# Patient Record
Sex: MALE | Race: WHITE | Employment: OTHER | URBAN - NONMETROPOLITAN AREA
[De-identification: names, ages, dates, MRNs, and addresses within clinical notes are randomized per-mention and may not be internally consistent; named-entity substitution may affect disease eponyms.]

---

## 2017-05-10 ENCOUNTER — OFFICE VISIT (OUTPATIENT)
Dept: PRIMARY CARE CLINIC | Age: 72
End: 2017-05-10
Payer: MEDICARE

## 2017-05-10 VITALS
HEIGHT: 74 IN | WEIGHT: 203.44 LBS | DIASTOLIC BLOOD PRESSURE: 84 MMHG | HEART RATE: 71 BPM | TEMPERATURE: 98.6 F | OXYGEN SATURATION: 97 % | SYSTOLIC BLOOD PRESSURE: 136 MMHG | BODY MASS INDEX: 26.11 KG/M2

## 2017-05-10 DIAGNOSIS — G60.9 IDIOPATHIC PERIPHERAL NEUROPATHY: Primary | ICD-10-CM

## 2017-05-10 PROCEDURE — G8427 DOCREV CUR MEDS BY ELIG CLIN: HCPCS | Performed by: NURSE PRACTITIONER

## 2017-05-10 PROCEDURE — 4040F PNEUMOC VAC/ADMIN/RCVD: CPT | Performed by: NURSE PRACTITIONER

## 2017-05-10 PROCEDURE — G8420 CALC BMI NORM PARAMETERS: HCPCS | Performed by: NURSE PRACTITIONER

## 2017-05-10 PROCEDURE — 1123F ACP DISCUSS/DSCN MKR DOCD: CPT | Performed by: NURSE PRACTITIONER

## 2017-05-10 PROCEDURE — 3017F COLORECTAL CA SCREEN DOC REV: CPT | Performed by: NURSE PRACTITIONER

## 2017-05-10 PROCEDURE — 1036F TOBACCO NON-USER: CPT | Performed by: NURSE PRACTITIONER

## 2017-05-10 PROCEDURE — 99213 OFFICE O/P EST LOW 20 MIN: CPT | Performed by: NURSE PRACTITIONER

## 2017-06-20 ENCOUNTER — OUTSIDE FACILITY SERVICE (OUTPATIENT)
Dept: CARDIOLOGY | Facility: CLINIC | Age: 72
End: 2017-06-20

## 2017-06-20 PROCEDURE — 93306 TTE W/DOPPLER COMPLETE: CPT | Performed by: INTERNAL MEDICINE

## 2017-06-22 ENCOUNTER — TELEPHONE (OUTPATIENT)
Dept: PRIMARY CARE CLINIC | Age: 72
End: 2017-06-22

## 2017-06-24 ENCOUNTER — HOSPITAL ENCOUNTER (INPATIENT)
Facility: HOSPITAL | Age: 72
LOS: 6 days | Discharge: NURSING, REHAB OR LONG-TERM CARE | End: 2017-06-30
Attending: INTERNAL MEDICINE | Admitting: FAMILY MEDICINE

## 2017-06-24 ENCOUNTER — APPOINTMENT (OUTPATIENT)
Dept: GENERAL RADIOLOGY | Facility: HOSPITAL | Age: 72
End: 2017-06-24

## 2017-06-24 ENCOUNTER — APPOINTMENT (OUTPATIENT)
Dept: MRI IMAGING | Facility: HOSPITAL | Age: 72
End: 2017-06-24

## 2017-06-24 DIAGNOSIS — Z78.9 IMPAIRED MOBILITY AND ADLS: ICD-10-CM

## 2017-06-24 DIAGNOSIS — Z74.09 IMPAIRED MOBILITY AND ADLS: ICD-10-CM

## 2017-06-24 DIAGNOSIS — R13.12 OROPHARYNGEAL DYSPHAGIA: Primary | ICD-10-CM

## 2017-06-24 DIAGNOSIS — R41.89 IMPAIRED COGNITION: ICD-10-CM

## 2017-06-24 DIAGNOSIS — Z74.09 IMPAIRED MOBILITY: ICD-10-CM

## 2017-06-24 PROBLEM — G04.90 ENCEPHALITIS: Status: ACTIVE | Noted: 2017-06-24

## 2017-06-24 LAB
ALBUMIN SERPL-MCNC: 3.9 G/DL (ref 3.5–5)
ALBUMIN/GLOB SERPL: 1.2 G/DL (ref 1.1–2.5)
ALP SERPL-CCNC: 80 U/L (ref 24–120)
ALT SERPL W P-5'-P-CCNC: 37 U/L (ref 0–54)
AMMONIA BLD-SCNC: <9 UMOL/L (ref 9–33)
AMYLASE SERPL-CCNC: 157 U/L (ref 30–110)
ANION GAP SERPL CALCULATED.3IONS-SCNC: 11 MMOL/L (ref 4–13)
APTT PPP: 30.5 SECONDS (ref 24.1–34.8)
ARTERIAL PATENCY WRIST A: ABNORMAL
AST SERPL-CCNC: 24 U/L (ref 7–45)
ATMOSPHERIC PRESS: ABNORMAL MMHG
BACTERIA UR QL AUTO: ABNORMAL /HPF
BASE EXCESS BLDA CALC-SCNC: -2 MMOL/L (ref -2–2)
BDY SITE: ABNORMAL
BILIRUB SERPL-MCNC: 1.5 MG/DL (ref 0.1–1)
BILIRUB UR QL STRIP: NEGATIVE
BILIRUB UR QL STRIP: NEGATIVE
BUN BLD-MCNC: 19 MG/DL (ref 5–21)
BUN/CREAT SERPL: 18.4 (ref 7–25)
CALCIUM SPEC-SCNC: 9.4 MG/DL (ref 8.4–10.4)
CHLORIDE SERPL-SCNC: 98 MMOL/L (ref 98–110)
CLARITY UR: CLEAR
CLARITY UR: CLEAR
CO2 SERPL-SCNC: 23 MMOL/L (ref 24–31)
COLOR UR: YELLOW
COLOR UR: YELLOW
CREAT BLD-MCNC: 1.03 MG/DL (ref 0.5–1.4)
CRP SERPL-MCNC: <0.5 MG/DL (ref 0–0.99)
D-LACTATE SERPL-SCNC: 0.9 MMOL/L (ref 0.5–2)
DEPRECATED RDW RBC AUTO: 41.2 FL (ref 40–54)
ERYTHROCYTE [DISTWIDTH] IN BLOOD BY AUTOMATED COUNT: 12.3 % (ref 12–15)
ERYTHROCYTE [SEDIMENTATION RATE] IN BLOOD: 8 MM/HR (ref 0–15)
FERRITIN SERPL-MCNC: 286 NG/ML (ref 17.9–464)
FOLATE SERPL-MCNC: >20 NG/ML
GFR SERPL CREATININE-BSD FRML MDRD: 72 ML/MIN/1.73
GLOBULIN UR ELPH-MCNC: 3.2 GM/DL
GLUCOSE BLD-MCNC: 105 MG/DL (ref 70–100)
GLUCOSE UR STRIP-MCNC: NEGATIVE MG/DL
GLUCOSE UR STRIP-MCNC: NEGATIVE MG/DL
HBA1C MFR BLD: 5.8 %
HCO3 BLDA-SCNC: 21.6 MMOL/L (ref 22–26)
HCT VFR BLD AUTO: 45.4 % (ref 40–52)
HGB BLD-MCNC: 16.4 G/DL (ref 14–18)
HGB UR QL STRIP.AUTO: NEGATIVE
HGB UR QL STRIP.AUTO: NEGATIVE
HYALINE CASTS UR QL AUTO: ABNORMAL /LPF
INR PPP: 1.05 (ref 0.91–1.09)
IRON 24H UR-MRATE: 58 MCG/DL (ref 42–180)
IRON SATN MFR SERPL: 24 % (ref 20–45)
KETONES UR QL STRIP: ABNORMAL
KETONES UR QL STRIP: ABNORMAL
LEUKOCYTE ESTERASE UR QL STRIP.AUTO: NEGATIVE
LEUKOCYTE ESTERASE UR QL STRIP.AUTO: NEGATIVE
LIPASE SERPL-CCNC: 97 U/L (ref 23–203)
LYMPHOCYTES # BLD MANUAL: 0.57 10*3/MM3 (ref 0.72–4.86)
LYMPHOCYTES NFR BLD MANUAL: 5 % (ref 4–12)
LYMPHOCYTES NFR BLD MANUAL: 6 % (ref 15–45)
MAGNESIUM SERPL-MCNC: 2.1 MG/DL (ref 1.4–2.2)
MCH RBC QN AUTO: 33.1 PG (ref 28–32)
MCHC RBC AUTO-ENTMCNC: 36.1 G/DL (ref 33–36)
MCV RBC AUTO: 91.5 FL (ref 82–95)
MODALITY: ABNORMAL
MONOCYTES # BLD AUTO: 0.47 10*3/MM3 (ref 0.19–1.3)
NEUTROPHILS # BLD AUTO: 8.33 10*3/MM3 (ref 1.87–8.4)
NEUTROPHILS NFR BLD MANUAL: 87 % (ref 39–78)
NEUTS BAND NFR BLD MANUAL: 1 % (ref 0–10)
NITRITE UR QL STRIP: NEGATIVE
NITRITE UR QL STRIP: NEGATIVE
PCO2 BLDA: 33.8 MM HG (ref 35–45)
PH BLDA: 7.42 PH UNITS (ref 7.35–7.45)
PH UR STRIP.AUTO: <=5 [PH] (ref 5–8)
PH UR STRIP.AUTO: <=5 [PH] (ref 5–8)
PHOSPHATE SERPL-MCNC: 3.5 MG/DL (ref 2.5–4.5)
PLAT MORPH BLD: NORMAL
PLATELET # BLD AUTO: 234 10*3/MM3 (ref 130–400)
PMV BLD AUTO: 10.7 FL (ref 6–12)
PO2 BLDA: 74.5 MM HG (ref 80–100)
POTASSIUM BLD-SCNC: 4.3 MMOL/L (ref 3.5–5.3)
PROT SERPL-MCNC: 7.1 G/DL (ref 6.3–8.7)
PROT UR QL STRIP: ABNORMAL
PROT UR QL STRIP: ABNORMAL
PROTHROMBIN TIME: 14 SECONDS (ref 11.9–14.6)
RBC # BLD AUTO: 4.96 10*6/MM3 (ref 4.8–5.9)
RBC # UR: ABNORMAL /HPF
RBC MORPH BLD: NORMAL
REF LAB TEST METHOD: ABNORMAL
SAO2 % BLDCOA: 95.4 % (ref 94–100)
SAO2 % BLDCOA: 95.4 % (ref 94–100)
SODIUM BLD-SCNC: 132 MMOL/L (ref 135–145)
SP GR UR STRIP: 1.02 (ref 1–1.03)
SP GR UR STRIP: 1.02 (ref 1–1.03)
SQUAMOUS #/AREA URNS HPF: ABNORMAL /HPF
T3FREE SERPL-MCNC: 2.25 PG/ML (ref 2.77–5.27)
T4 FREE SERPL-MCNC: 1.73 NG/DL (ref 0.78–2.19)
TIBC SERPL-MCNC: 239 MCG/DL (ref 225–420)
TSH SERPL DL<=0.05 MIU/L-ACNC: 0.71 MIU/ML (ref 0.47–4.68)
UROBILINOGEN UR QL STRIP: ABNORMAL
UROBILINOGEN UR QL STRIP: ABNORMAL
VARIANT LYMPHS NFR BLD MANUAL: 1 % (ref 0–5)
WBC MORPH BLD: NORMAL
WBC NRBC COR # BLD: 9.47 10*3/MM3 (ref 4.8–10.8)
WBC UR QL AUTO: ABNORMAL /HPF

## 2017-06-24 PROCEDURE — 71020 HC CHEST PA AND LATERAL: CPT

## 2017-06-24 PROCEDURE — 82728 ASSAY OF FERRITIN: CPT | Performed by: INTERNAL MEDICINE

## 2017-06-24 PROCEDURE — 85651 RBC SED RATE NONAUTOMATED: CPT | Performed by: INTERNAL MEDICINE

## 2017-06-24 PROCEDURE — 84425 ASSAY OF VITAMIN B-1: CPT | Performed by: PSYCHIATRY & NEUROLOGY

## 2017-06-24 PROCEDURE — 85730 THROMBOPLASTIN TIME PARTIAL: CPT | Performed by: INTERNAL MEDICINE

## 2017-06-24 PROCEDURE — 87040 BLOOD CULTURE FOR BACTERIA: CPT | Performed by: INTERNAL MEDICINE

## 2017-06-24 PROCEDURE — 25010000002 KETOROLAC TROMETHAMINE PER 15 MG: Performed by: PSYCHIATRY & NEUROLOGY

## 2017-06-24 PROCEDURE — A9577 INJ MULTIHANCE: HCPCS | Performed by: INTERNAL MEDICINE

## 2017-06-24 PROCEDURE — 84443 ASSAY THYROID STIM HORMONE: CPT | Performed by: INTERNAL MEDICINE

## 2017-06-24 PROCEDURE — 99223 1ST HOSP IP/OBS HIGH 75: CPT | Performed by: PSYCHIATRY & NEUROLOGY

## 2017-06-24 PROCEDURE — 25810000003 DEXTROSE-NACL PER 500 ML: Performed by: INTERNAL MEDICINE

## 2017-06-24 PROCEDURE — 0 GADOBENATE DIMEGLUMINE 529 MG/ML SOLUTION: Performed by: INTERNAL MEDICINE

## 2017-06-24 PROCEDURE — 83605 ASSAY OF LACTIC ACID: CPT | Performed by: INTERNAL MEDICINE

## 2017-06-24 PROCEDURE — 86038 ANTINUCLEAR ANTIBODIES: CPT | Performed by: INTERNAL MEDICINE

## 2017-06-24 PROCEDURE — 87106 FUNGI IDENTIFICATION YEAST: CPT | Performed by: INTERNAL MEDICINE

## 2017-06-24 PROCEDURE — 84481 FREE ASSAY (FT-3): CPT | Performed by: PSYCHIATRY & NEUROLOGY

## 2017-06-24 PROCEDURE — 82140 ASSAY OF AMMONIA: CPT | Performed by: PSYCHIATRY & NEUROLOGY

## 2017-06-24 PROCEDURE — 81001 URINALYSIS AUTO W/SCOPE: CPT | Performed by: INTERNAL MEDICINE

## 2017-06-24 PROCEDURE — 86140 C-REACTIVE PROTEIN: CPT | Performed by: INTERNAL MEDICINE

## 2017-06-24 PROCEDURE — 81003 URINALYSIS AUTO W/O SCOPE: CPT | Performed by: INTERNAL MEDICINE

## 2017-06-24 PROCEDURE — 85007 BL SMEAR W/DIFF WBC COUNT: CPT | Performed by: INTERNAL MEDICINE

## 2017-06-24 PROCEDURE — 80053 COMPREHEN METABOLIC PANEL: CPT | Performed by: INTERNAL MEDICINE

## 2017-06-24 PROCEDURE — 70553 MRI BRAIN STEM W/O & W/DYE: CPT

## 2017-06-24 PROCEDURE — 84100 ASSAY OF PHOSPHORUS: CPT | Performed by: INTERNAL MEDICINE

## 2017-06-24 PROCEDURE — 83735 ASSAY OF MAGNESIUM: CPT | Performed by: INTERNAL MEDICINE

## 2017-06-24 PROCEDURE — 82746 ASSAY OF FOLIC ACID SERUM: CPT | Performed by: PSYCHIATRY & NEUROLOGY

## 2017-06-24 PROCEDURE — 87205 SMEAR GRAM STAIN: CPT | Performed by: INTERNAL MEDICINE

## 2017-06-24 PROCEDURE — 85610 PROTHROMBIN TIME: CPT | Performed by: INTERNAL MEDICINE

## 2017-06-24 PROCEDURE — 82150 ASSAY OF AMYLASE: CPT | Performed by: INTERNAL MEDICINE

## 2017-06-24 PROCEDURE — 83550 IRON BINDING TEST: CPT | Performed by: INTERNAL MEDICINE

## 2017-06-24 PROCEDURE — 83036 HEMOGLOBIN GLYCOSYLATED A1C: CPT | Performed by: INTERNAL MEDICINE

## 2017-06-24 PROCEDURE — 83690 ASSAY OF LIPASE: CPT | Performed by: INTERNAL MEDICINE

## 2017-06-24 PROCEDURE — 36600 WITHDRAWAL OF ARTERIAL BLOOD: CPT

## 2017-06-24 PROCEDURE — 84439 ASSAY OF FREE THYROXINE: CPT | Performed by: INTERNAL MEDICINE

## 2017-06-24 PROCEDURE — 87086 URINE CULTURE/COLONY COUNT: CPT | Performed by: INTERNAL MEDICINE

## 2017-06-24 PROCEDURE — 82803 BLOOD GASES ANY COMBINATION: CPT

## 2017-06-24 PROCEDURE — 87150 DNA/RNA AMPLIFIED PROBE: CPT | Performed by: INTERNAL MEDICINE

## 2017-06-24 PROCEDURE — 83540 ASSAY OF IRON: CPT | Performed by: INTERNAL MEDICINE

## 2017-06-24 PROCEDURE — 86789 WEST NILE VIRUS ANTIBODY: CPT | Performed by: INTERNAL MEDICINE

## 2017-06-24 PROCEDURE — 86788 WEST NILE VIRUS AB IGM: CPT | Performed by: INTERNAL MEDICINE

## 2017-06-24 PROCEDURE — 85027 COMPLETE CBC AUTOMATED: CPT | Performed by: INTERNAL MEDICINE

## 2017-06-24 RX ORDER — BISACODYL 5 MG/1
5 TABLET, DELAYED RELEASE ORAL DAILY PRN
Status: DISCONTINUED | OUTPATIENT
Start: 2017-06-24 | End: 2017-06-30 | Stop reason: HOSPADM

## 2017-06-24 RX ORDER — ENALAPRILAT 2.5 MG/2ML
0.62 INJECTION INTRAVENOUS EVERY 6 HOURS PRN
Status: DISCONTINUED | OUTPATIENT
Start: 2017-06-24 | End: 2017-06-30 | Stop reason: HOSPADM

## 2017-06-24 RX ORDER — ACETAMINOPHEN 325 MG/1
650 TABLET ORAL EVERY 4 HOURS PRN
Status: DISCONTINUED | OUTPATIENT
Start: 2017-06-24 | End: 2017-06-26

## 2017-06-24 RX ORDER — KETOROLAC TROMETHAMINE 30 MG/ML
30 INJECTION, SOLUTION INTRAMUSCULAR; INTRAVENOUS EVERY 6 HOURS PRN
Status: DISPENSED | OUTPATIENT
Start: 2017-06-24 | End: 2017-06-29

## 2017-06-24 RX ORDER — ONDANSETRON 4 MG/1
4 TABLET, FILM COATED ORAL EVERY 6 HOURS PRN
Status: DISCONTINUED | OUTPATIENT
Start: 2017-06-24 | End: 2017-06-26

## 2017-06-24 RX ORDER — ACETAMINOPHEN 650 MG/1
650 SUPPOSITORY RECTAL EVERY 4 HOURS PRN
Status: DISCONTINUED | OUTPATIENT
Start: 2017-06-24 | End: 2017-06-30 | Stop reason: HOSPADM

## 2017-06-24 RX ORDER — LORAZEPAM 2 MG/ML
0.5 INJECTION INTRAMUSCULAR EVERY 6 HOURS PRN
Status: DISCONTINUED | OUTPATIENT
Start: 2017-06-24 | End: 2017-06-30 | Stop reason: HOSPADM

## 2017-06-24 RX ORDER — PANTOPRAZOLE SODIUM 40 MG/1
40 TABLET, DELAYED RELEASE ORAL
Status: DISCONTINUED | OUTPATIENT
Start: 2017-06-25 | End: 2017-06-30 | Stop reason: HOSPADM

## 2017-06-24 RX ORDER — DEXTROSE AND SODIUM CHLORIDE 5; .9 G/100ML; G/100ML
125 INJECTION, SOLUTION INTRAVENOUS CONTINUOUS
Status: DISCONTINUED | OUTPATIENT
Start: 2017-06-24 | End: 2017-06-26

## 2017-06-24 RX ORDER — ACETAMINOPHEN 325 MG/1
650 TABLET ORAL EVERY 4 HOURS PRN
Status: DISCONTINUED | OUTPATIENT
Start: 2017-06-24 | End: 2017-06-30 | Stop reason: HOSPADM

## 2017-06-24 RX ORDER — IBUPROFEN 400 MG/1
400 TABLET ORAL EVERY 4 HOURS PRN
Status: DISCONTINUED | OUTPATIENT
Start: 2017-06-24 | End: 2017-06-30 | Stop reason: HOSPADM

## 2017-06-24 RX ORDER — SODIUM CHLORIDE 0.9 % (FLUSH) 0.9 %
1-10 SYRINGE (ML) INJECTION AS NEEDED
Status: DISCONTINUED | OUTPATIENT
Start: 2017-06-24 | End: 2017-06-26

## 2017-06-24 RX ADMIN — DEXTROSE AND SODIUM CHLORIDE 125 ML/HR: 5; 900 INJECTION, SOLUTION INTRAVENOUS at 14:55

## 2017-06-24 RX ADMIN — KETOROLAC TROMETHAMINE 30 MG: 30 INJECTION, SOLUTION INTRAMUSCULAR at 16:19

## 2017-06-24 RX ADMIN — KETOROLAC TROMETHAMINE 30 MG: 30 INJECTION, SOLUTION INTRAMUSCULAR at 23:41

## 2017-06-24 RX ADMIN — DEXTROSE AND SODIUM CHLORIDE 125 ML/HR: 5; 900 INJECTION, SOLUTION INTRAVENOUS at 23:42

## 2017-06-24 RX ADMIN — GADOBENATE DIMEGLUMINE 10 ML: 529 INJECTION, SOLUTION INTRAVENOUS at 15:50

## 2017-06-25 ENCOUNTER — APPOINTMENT (OUTPATIENT)
Dept: ULTRASOUND IMAGING | Facility: HOSPITAL | Age: 72
End: 2017-06-25

## 2017-06-25 ENCOUNTER — APPOINTMENT (OUTPATIENT)
Dept: CT IMAGING | Facility: HOSPITAL | Age: 72
End: 2017-06-25

## 2017-06-25 ENCOUNTER — APPOINTMENT (OUTPATIENT)
Dept: CARDIOLOGY | Facility: HOSPITAL | Age: 72
End: 2017-06-25
Attending: INTERNAL MEDICINE

## 2017-06-25 LAB
ANION GAP SERPL CALCULATED.3IONS-SCNC: 9 MMOL/L (ref 4–13)
ARTICHOKE IGE QN: 60 MG/DL (ref 0–99)
BASOPHILS # BLD AUTO: 0.01 10*3/MM3 (ref 0–0.2)
BASOPHILS NFR BLD AUTO: 0.1 % (ref 0–2)
BH CV ECHO MEAS - AO MAX PG (FULL): 1.8 MMHG
BH CV ECHO MEAS - AO MAX PG: 8.1 MMHG
BH CV ECHO MEAS - AO MEAN PG (FULL): 2 MMHG
BH CV ECHO MEAS - AO MEAN PG: 4 MMHG
BH CV ECHO MEAS - AO ROOT AREA (BSA CORRECTED): 1.7
BH CV ECHO MEAS - AO ROOT AREA: 9.6 CM^2
BH CV ECHO MEAS - AO ROOT DIAM: 3.5 CM
BH CV ECHO MEAS - AO V2 MAX: 142 CM/SEC
BH CV ECHO MEAS - AO V2 MEAN: 86.8 CM/SEC
BH CV ECHO MEAS - AO V2 VTI: 22.3 CM
BH CV ECHO MEAS - AVA(I,A): 4 CM^2
BH CV ECHO MEAS - AVA(I,D): 4 CM^2
BH CV ECHO MEAS - AVA(V,A): 4 CM^2
BH CV ECHO MEAS - AVA(V,D): 4 CM^2
BH CV ECHO MEAS - BSA(HAYCOCK): 2 M^2
BH CV ECHO MEAS - BSA: 2 M^2
BH CV ECHO MEAS - BZI_BMI: 23.7 KILOGRAMS/M^2
BH CV ECHO MEAS - BZI_METRIC_HEIGHT: 182.9 CM
BH CV ECHO MEAS - BZI_METRIC_WEIGHT: 79.4 KG
BH CV ECHO MEAS - EDV(CUBED): 125 ML
BH CV ECHO MEAS - EDV(TEICH): 118.2 ML
BH CV ECHO MEAS - EF(CUBED): 87.5 %
BH CV ECHO MEAS - EF(TEICH): 81.1 %
BH CV ECHO MEAS - ESV(CUBED): 15.6 ML
BH CV ECHO MEAS - ESV(TEICH): 22.3 ML
BH CV ECHO MEAS - FS: 50 %
BH CV ECHO MEAS - IVS/LVPW: 1.1
BH CV ECHO MEAS - IVSD: 1.1 CM
BH CV ECHO MEAS - LA DIMENSION: 2.9 CM
BH CV ECHO MEAS - LA/AO: 0.83
BH CV ECHO MEAS - LAT PEAK E' VEL: 4.9 CM/SEC
BH CV ECHO MEAS - LV MASS(C)D: 194.4 GRAMS
BH CV ECHO MEAS - LV MASS(C)DI: 96.6 GRAMS/M^2
BH CV ECHO MEAS - LV MAX PG: 6.3 MMHG
BH CV ECHO MEAS - LV MEAN PG: 2 MMHG
BH CV ECHO MEAS - LV V1 MAX: 125 CM/SEC
BH CV ECHO MEAS - LV V1 MEAN: 66.1 CM/SEC
BH CV ECHO MEAS - LV V1 VTI: 19.8 CM
BH CV ECHO MEAS - LVIDD: 5 CM
BH CV ECHO MEAS - LVIDS: 2.5 CM
BH CV ECHO MEAS - LVOT AREA (M): 4.5 CM^2
BH CV ECHO MEAS - LVOT AREA: 4.5 CM^2
BH CV ECHO MEAS - LVOT DIAM: 2.4 CM
BH CV ECHO MEAS - LVPWD: 1 CM
BH CV ECHO MEAS - MED PEAK E' VEL: 3.92 CM/SEC
BH CV ECHO MEAS - MV A MAX VEL: 87.3 CM/SEC
BH CV ECHO MEAS - MV DEC TIME: 0.31 SEC
BH CV ECHO MEAS - MV E MAX VEL: 57.7 CM/SEC
BH CV ECHO MEAS - MV E/A: 0.66
BH CV ECHO MEAS - RAP SYSTOLE: 5 MMHG
BH CV ECHO MEAS - RVSP: 29.6 MMHG
BH CV ECHO MEAS - SI(AO): 106.6 ML/M^2
BH CV ECHO MEAS - SI(CUBED): 54.3 ML/M^2
BH CV ECHO MEAS - SI(LVOT): 44.5 ML/M^2
BH CV ECHO MEAS - SI(TEICH): 47.7 ML/M^2
BH CV ECHO MEAS - SV(AO): 214.6 ML
BH CV ECHO MEAS - SV(CUBED): 109.4 ML
BH CV ECHO MEAS - SV(LVOT): 89.6 ML
BH CV ECHO MEAS - SV(TEICH): 95.9 ML
BH CV ECHO MEAS - TR MAX VEL: 248 CM/SEC
BUN BLD-MCNC: 17 MG/DL (ref 5–21)
BUN/CREAT SERPL: 19.1 (ref 7–25)
CALCIUM SPEC-SCNC: 8.6 MG/DL (ref 8.4–10.4)
CHLORIDE SERPL-SCNC: 99 MMOL/L (ref 98–110)
CO2 SERPL-SCNC: 24 MMOL/L (ref 24–31)
CREAT BLD-MCNC: 0.89 MG/DL (ref 0.5–1.4)
DEPRECATED RDW RBC AUTO: 41.1 FL (ref 40–54)
E/E' RATIO: 14.7
EOSINOPHIL # BLD AUTO: 0.03 10*3/MM3 (ref 0–0.7)
EOSINOPHIL NFR BLD AUTO: 0.3 % (ref 0–4)
ERYTHROCYTE [DISTWIDTH] IN BLOOD BY AUTOMATED COUNT: 12.3 % (ref 12–15)
GFR SERPL CREATININE-BSD FRML MDRD: 85 ML/MIN/1.73
GLUCOSE BLD-MCNC: 145 MG/DL (ref 70–100)
GLUCOSE BLDC GLUCOMTR-MCNC: 112 MG/DL (ref 70–130)
HCT VFR BLD AUTO: 40.7 % (ref 40–52)
HGB BLD-MCNC: 14.5 G/DL (ref 14–18)
IMM GRANULOCYTES # BLD: 0.03 10*3/MM3 (ref 0–0.03)
IMM GRANULOCYTES NFR BLD: 0.3 % (ref 0–5)
LEFT ATRIUM VOLUME INDEX: 27.2 ML/M2
LEFT ATRIUM VOLUME: 54.6 CM3
LYMPHOCYTES # BLD AUTO: 1.1 10*3/MM3 (ref 0.72–4.86)
LYMPHOCYTES NFR BLD AUTO: 12.8 % (ref 15–45)
MAGNESIUM SERPL-MCNC: 2 MG/DL (ref 1.4–2.2)
MCH RBC QN AUTO: 32.4 PG (ref 28–32)
MCHC RBC AUTO-ENTMCNC: 35.6 G/DL (ref 33–36)
MCV RBC AUTO: 90.8 FL (ref 82–95)
MONOCYTES # BLD AUTO: 0.93 10*3/MM3 (ref 0.19–1.3)
MONOCYTES NFR BLD AUTO: 10.8 % (ref 4–12)
NEUTROPHILS # BLD AUTO: 6.52 10*3/MM3 (ref 1.87–8.4)
NEUTROPHILS NFR BLD AUTO: 75.7 % (ref 39–78)
PHOSPHATE SERPL-MCNC: 2.8 MG/DL (ref 2.5–4.5)
PLATELET # BLD AUTO: 220 10*3/MM3 (ref 130–400)
PMV BLD AUTO: 10.6 FL (ref 6–12)
POTASSIUM BLD-SCNC: 3.8 MMOL/L (ref 3.5–5.3)
RBC # BLD AUTO: 4.48 10*6/MM3 (ref 4.8–5.9)
SODIUM BLD-SCNC: 132 MMOL/L (ref 135–145)
TROPONIN I SERPL-MCNC: <0.012 NG/ML (ref 0–0.03)
VIT B12 BLD-MCNC: >1000 PG/ML (ref 239–931)
WBC NRBC COR # BLD: 8.62 10*3/MM3 (ref 4.8–10.8)

## 2017-06-25 PROCEDURE — 25010000002 KETOROLAC TROMETHAMINE PER 15 MG: Performed by: PSYCHIATRY & NEUROLOGY

## 2017-06-25 PROCEDURE — 99233 SBSQ HOSP IP/OBS HIGH 50: CPT | Performed by: PSYCHIATRY & NEUROLOGY

## 2017-06-25 PROCEDURE — 84484 ASSAY OF TROPONIN QUANT: CPT | Performed by: PSYCHIATRY & NEUROLOGY

## 2017-06-25 PROCEDURE — 80048 BASIC METABOLIC PNL TOTAL CA: CPT | Performed by: INTERNAL MEDICINE

## 2017-06-25 PROCEDURE — 82962 GLUCOSE BLOOD TEST: CPT

## 2017-06-25 PROCEDURE — 93880 EXTRACRANIAL BILAT STUDY: CPT

## 2017-06-25 PROCEDURE — 92610 EVALUATE SWALLOWING FUNCTION: CPT

## 2017-06-25 PROCEDURE — G9175 SPEECH LANG GOAL STATUS: HCPCS

## 2017-06-25 PROCEDURE — 84100 ASSAY OF PHOSPHORUS: CPT | Performed by: INTERNAL MEDICINE

## 2017-06-25 PROCEDURE — 93880 EXTRACRANIAL BILAT STUDY: CPT | Performed by: SURGERY

## 2017-06-25 PROCEDURE — G8987 SELF CARE CURRENT STATUS: HCPCS | Performed by: OCCUPATIONAL THERAPIST

## 2017-06-25 PROCEDURE — G9176 SPEECH LANG D/C STATUS: HCPCS

## 2017-06-25 PROCEDURE — 70496 CT ANGIOGRAPHY HEAD: CPT

## 2017-06-25 PROCEDURE — 92523 SPEECH SOUND LANG COMPREHEN: CPT

## 2017-06-25 PROCEDURE — 83721 ASSAY OF BLOOD LIPOPROTEIN: CPT | Performed by: INTERNAL MEDICINE

## 2017-06-25 PROCEDURE — 94799 UNLISTED PULMONARY SVC/PX: CPT

## 2017-06-25 PROCEDURE — G8997 SWALLOW GOAL STATUS: HCPCS

## 2017-06-25 PROCEDURE — 85025 COMPLETE CBC W/AUTO DIFF WBC: CPT | Performed by: INTERNAL MEDICINE

## 2017-06-25 PROCEDURE — 97166 OT EVAL MOD COMPLEX 45 MIN: CPT | Performed by: OCCUPATIONAL THERAPIST

## 2017-06-25 PROCEDURE — 0 IOPAMIDOL PER 1 ML: Performed by: INTERNAL MEDICINE

## 2017-06-25 PROCEDURE — 83735 ASSAY OF MAGNESIUM: CPT | Performed by: INTERNAL MEDICINE

## 2017-06-25 PROCEDURE — 70498 CT ANGIOGRAPHY NECK: CPT

## 2017-06-25 PROCEDURE — G8988 SELF CARE GOAL STATUS: HCPCS | Performed by: OCCUPATIONAL THERAPIST

## 2017-06-25 PROCEDURE — G8996 SWALLOW CURRENT STATUS: HCPCS

## 2017-06-25 PROCEDURE — 93306 TTE W/DOPPLER COMPLETE: CPT | Performed by: INTERNAL MEDICINE

## 2017-06-25 PROCEDURE — 82607 VITAMIN B-12: CPT | Performed by: PSYCHIATRY & NEUROLOGY

## 2017-06-25 PROCEDURE — 93306 TTE W/DOPPLER COMPLETE: CPT

## 2017-06-25 PROCEDURE — 25810000003 DEXTROSE-NACL PER 500 ML: Performed by: INTERNAL MEDICINE

## 2017-06-25 PROCEDURE — G9174 SPEECH LANG CURRENT STATUS: HCPCS

## 2017-06-25 RX ORDER — ATORVASTATIN CALCIUM 40 MG/1
80 TABLET, FILM COATED ORAL NIGHTLY
Status: DISCONTINUED | OUTPATIENT
Start: 2017-06-25 | End: 2017-06-30 | Stop reason: HOSPADM

## 2017-06-25 RX ORDER — SODIUM CHLORIDE 0.9 % (FLUSH) 0.9 %
1-10 SYRINGE (ML) INJECTION AS NEEDED
Status: DISCONTINUED | OUTPATIENT
Start: 2017-06-25 | End: 2017-06-30 | Stop reason: HOSPADM

## 2017-06-25 RX ORDER — ATORVASTATIN CALCIUM 10 MG/1
10 TABLET, FILM COATED ORAL DAILY
Status: DISCONTINUED | OUTPATIENT
Start: 2017-06-25 | End: 2017-06-25 | Stop reason: SDUPTHER

## 2017-06-25 RX ORDER — LISINOPRIL 20 MG/1
20 TABLET ORAL DAILY
Status: DISCONTINUED | OUTPATIENT
Start: 2017-06-25 | End: 2017-06-30 | Stop reason: HOSPADM

## 2017-06-25 RX ORDER — ONDANSETRON 2 MG/ML
4 INJECTION INTRAMUSCULAR; INTRAVENOUS EVERY 6 HOURS PRN
Status: DISCONTINUED | OUTPATIENT
Start: 2017-06-25 | End: 2017-06-30 | Stop reason: HOSPADM

## 2017-06-25 RX ORDER — LOVASTATIN 20 MG/1
20 TABLET ORAL NIGHTLY
COMMUNITY
End: 2017-06-30 | Stop reason: HOSPADM

## 2017-06-25 RX ORDER — LISINOPRIL 20 MG/1
30 TABLET ORAL DAILY
COMMUNITY
End: 2017-11-10 | Stop reason: DRUGHIGH

## 2017-06-25 RX ORDER — ASPIRIN 81 MG/1
81 TABLET ORAL DAILY
COMMUNITY
End: 2017-08-10

## 2017-06-25 RX ORDER — LABETALOL HYDROCHLORIDE 5 MG/ML
10 INJECTION, SOLUTION INTRAVENOUS
Status: DISCONTINUED | OUTPATIENT
Start: 2017-06-25 | End: 2017-06-30 | Stop reason: HOSPADM

## 2017-06-25 RX ORDER — ASPIRIN 81 MG/1
81 TABLET ORAL DAILY
Status: DISCONTINUED | OUTPATIENT
Start: 2017-06-25 | End: 2017-06-30 | Stop reason: HOSPADM

## 2017-06-25 RX ADMIN — KETOROLAC TROMETHAMINE 30 MG: 30 INJECTION, SOLUTION INTRAMUSCULAR at 07:44

## 2017-06-25 RX ADMIN — ACETAMINOPHEN 650 MG: 325 TABLET, FILM COATED ORAL at 20:43

## 2017-06-25 RX ADMIN — IOPAMIDOL 150 ML: 755 INJECTION, SOLUTION INTRAVENOUS at 12:45

## 2017-06-25 RX ADMIN — LISINOPRIL 20 MG: 20 TABLET ORAL at 17:25

## 2017-06-25 RX ADMIN — DESMOPRESSIN ACETATE 80 MG: 0.2 TABLET ORAL at 20:46

## 2017-06-25 RX ADMIN — ASPIRIN 81 MG: 81 TABLET ORAL at 17:25

## 2017-06-25 RX ADMIN — DEXTROSE AND SODIUM CHLORIDE 125 ML/HR: 5; 900 INJECTION, SOLUTION INTRAVENOUS at 08:06

## 2017-06-25 RX ADMIN — IBUPROFEN 400 MG: 400 TABLET, FILM COATED ORAL at 12:14

## 2017-06-25 RX ADMIN — DEXTROSE AND SODIUM CHLORIDE 125 ML/HR: 5; 900 INJECTION, SOLUTION INTRAVENOUS at 17:26

## 2017-06-25 RX ADMIN — BISACODYL 5 MG: 5 TABLET, COATED ORAL at 17:25

## 2017-06-26 PROBLEM — R41.82 ALTERED MENTAL STATUS: Status: ACTIVE | Noted: 2017-06-26

## 2017-06-26 PROBLEM — I63.9 CVA (CEREBRAL VASCULAR ACCIDENT) (HCC): Status: ACTIVE | Noted: 2017-06-26

## 2017-06-26 PROBLEM — R51.9 BILATERAL HEADACHES: Status: ACTIVE | Noted: 2017-06-26

## 2017-06-26 PROBLEM — I10 HYPERTENSION: Status: ACTIVE | Noted: 2017-06-26

## 2017-06-26 LAB
ANION GAP SERPL CALCULATED.3IONS-SCNC: 7 MMOL/L (ref 4–13)
APPEARANCE CSF: CLEAR
APPEARANCE CSF: CLEAR
ARTICHOKE IGE QN: 52 MG/DL (ref 0–99)
BACTERIA SPEC AEROBE CULT: NORMAL
BASOPHILS # BLD AUTO: 0 10*3/MM3 (ref 0–0.2)
BASOPHILS NFR BLD AUTO: 0 % (ref 0–2)
BUN BLD-MCNC: 14 MG/DL (ref 5–21)
BUN/CREAT SERPL: 15.4 (ref 7–25)
CALCIUM SPEC-SCNC: 8.3 MG/DL (ref 8.4–10.4)
CHLORIDE SERPL-SCNC: 96 MMOL/L (ref 98–110)
CHOLEST SERPL-MCNC: 114 MG/DL (ref 130–200)
CO2 SERPL-SCNC: 26 MMOL/L (ref 24–31)
COLOR CSF: ABNORMAL
COLOR CSF: ABNORMAL
CREAT BLD-MCNC: 0.91 MG/DL (ref 0.5–1.4)
CRYPTOC AG TITR CSF: POSITIVE {TITER}
DEPRECATED RDW RBC AUTO: 40.7 FL (ref 40–54)
EOSINOPHIL # BLD AUTO: 0.04 10*3/MM3 (ref 0–0.7)
EOSINOPHIL NFR BLD AUTO: 0.5 % (ref 0–4)
ERYTHROCYTE [DISTWIDTH] IN BLOOD BY AUTOMATED COUNT: 12.2 % (ref 12–15)
GFR SERPL CREATININE-BSD FRML MDRD: 83 ML/MIN/1.73
GLUCOSE BLD-MCNC: 151 MG/DL (ref 70–100)
GLUCOSE BLDC GLUCOMTR-MCNC: 117 MG/DL (ref 70–130)
GLUCOSE BLDC GLUCOMTR-MCNC: 141 MG/DL (ref 70–130)
GLUCOSE CSF-MCNC: 23 MG/DL (ref 40–70)
HCT VFR BLD AUTO: 39.2 % (ref 40–52)
HDLC SERPL-MCNC: 37 MG/DL
HGB BLD-MCNC: 14 G/DL (ref 14–18)
IMM GRANULOCYTES # BLD: 0.04 10*3/MM3 (ref 0–0.03)
IMM GRANULOCYTES NFR BLD: 0.5 % (ref 0–5)
INDIA INK PREP CSF: ABNORMAL
LDLC/HDLC SERPL: 1.68 {RATIO}
LYMPHOCYTES # BLD AUTO: 1.08 10*3/MM3 (ref 0.72–4.86)
LYMPHOCYTES NFR BLD AUTO: 12.7 % (ref 15–45)
LYMPHOCYTES NFR CSF MANUAL: 61 %
LYMPHOCYTES NFR CSF MANUAL: 67 %
MAGNESIUM SERPL-MCNC: 1.9 MG/DL (ref 1.4–2.2)
MAGNESIUM SERPL-MCNC: 2.1 MG/DL (ref 1.4–2.2)
MCH RBC QN AUTO: 32.4 PG (ref 28–32)
MCHC RBC AUTO-ENTMCNC: 35.7 G/DL (ref 33–36)
MCV RBC AUTO: 90.7 FL (ref 82–95)
METHOD: ABNORMAL
MONOCYTES # BLD AUTO: 1.05 10*3/MM3 (ref 0.19–1.3)
MONOCYTES NFR BLD AUTO: 12.3 % (ref 4–12)
MONOCYTES NFR CSF MANUAL: 30 % (ref 15–45)
MONOCYTES NFR CSF MANUAL: 35 % (ref 15–45)
NEUTROPHILS # BLD AUTO: 6.3 10*3/MM3 (ref 1.87–8.4)
NEUTROPHILS NFR BLD AUTO: 74 % (ref 39–78)
NEUTROPHILS NFR CSF MICRO: 3 % (ref 0–6)
NEUTROPHILS NFR CSF MICRO: 4 % (ref 0–6)
NUC CELL # CSF MANUAL: 115 /MM3 (ref 0–8)
NUC CELL # CSF MANUAL: 92 /MM3 (ref 0–8)
PHOSPHATE SERPL-MCNC: 2.9 MG/DL (ref 2.5–4.5)
PLATELET # BLD AUTO: 209 10*3/MM3 (ref 130–400)
PMV BLD AUTO: 10.1 FL (ref 6–12)
POTASSIUM BLD-SCNC: 3.8 MMOL/L (ref 3.5–5.3)
PROT CSF-MCNC: 295 MG/DL (ref 12–60)
RBC # BLD AUTO: 4.32 10*6/MM3 (ref 4.8–5.9)
RBC # CSF MANUAL: 48 /MM3 (ref 0–0)
RBC # CSF MANUAL: 86 /MM3 (ref 0–0)
SODIUM BLD-SCNC: 129 MMOL/L (ref 135–145)
SPECIMEN VOL CSF: 16 ML
SPECIMEN VOL CSF: 16 ML
TRIGL SERPL-MCNC: 75 MG/DL (ref 0–149)
TSH SERPL DL<=0.05 MIU/L-ACNC: 1.24 MIU/ML (ref 0.47–4.68)
TUBE # CSF: 1
TUBE # CSF: 4
WBC NRBC COR # BLD: 8.51 10*3/MM3 (ref 4.8–10.8)

## 2017-06-26 PROCEDURE — 86617 LYME DISEASE ANTIBODY: CPT | Performed by: PSYCHIATRY & NEUROLOGY

## 2017-06-26 PROCEDURE — 25810000003 DEXTROSE-NACL PER 500 ML: Performed by: FAMILY MEDICINE

## 2017-06-26 PROCEDURE — 25010000002 AMPHOTERICIN B LIPOSOME PER 10 MG: Performed by: INTERNAL MEDICINE

## 2017-06-26 PROCEDURE — G8979 MOBILITY GOAL STATUS: HCPCS

## 2017-06-26 PROCEDURE — 25010000002 MEPERIDINE PER 100 MG: Performed by: INTERNAL MEDICINE

## 2017-06-26 PROCEDURE — 80061 LIPID PANEL: CPT | Performed by: PSYCHIATRY & NEUROLOGY

## 2017-06-26 PROCEDURE — C1751 CATH, INF, PER/CENT/MIDLINE: HCPCS

## 2017-06-26 PROCEDURE — 83735 ASSAY OF MAGNESIUM: CPT | Performed by: INTERNAL MEDICINE

## 2017-06-26 PROCEDURE — 97116 GAIT TRAINING THERAPY: CPT

## 2017-06-26 PROCEDURE — 89051 BODY FLUID CELL COUNT: CPT | Performed by: PSYCHIATRY & NEUROLOGY

## 2017-06-26 PROCEDURE — 02HV33Z INSERTION OF INFUSION DEVICE INTO SUPERIOR VENA CAVA, PERCUTANEOUS APPROACH: ICD-10-PCS | Performed by: INTERNAL MEDICINE

## 2017-06-26 PROCEDURE — 82962 GLUCOSE BLOOD TEST: CPT

## 2017-06-26 PROCEDURE — 83916 OLIGOCLONAL BANDS: CPT | Performed by: PSYCHIATRY & NEUROLOGY

## 2017-06-26 PROCEDURE — 84100 ASSAY OF PHOSPHORUS: CPT | Performed by: INTERNAL MEDICINE

## 2017-06-26 PROCEDURE — 87389 HIV-1 AG W/HIV-1&-2 AB AG IA: CPT | Performed by: INTERNAL MEDICINE

## 2017-06-26 PROCEDURE — 87015 SPECIMEN INFECT AGNT CONCNTJ: CPT | Performed by: PSYCHIATRY & NEUROLOGY

## 2017-06-26 PROCEDURE — G8978 MOBILITY CURRENT STATUS: HCPCS

## 2017-06-26 PROCEDURE — 80048 BASIC METABOLIC PNL TOTAL CA: CPT | Performed by: INTERNAL MEDICINE

## 2017-06-26 PROCEDURE — 25010000002 ONDANSETRON PER 1 MG: Performed by: PSYCHIATRY & NEUROLOGY

## 2017-06-26 PROCEDURE — 87070 CULTURE OTHR SPECIMN AEROBIC: CPT | Performed by: PSYCHIATRY & NEUROLOGY

## 2017-06-26 PROCEDURE — 83873 ASSAY OF CSF PROTEIN: CPT | Performed by: PSYCHIATRY & NEUROLOGY

## 2017-06-26 PROCEDURE — 97535 SELF CARE MNGMENT TRAINING: CPT

## 2017-06-26 PROCEDURE — 84443 ASSAY THYROID STIM HORMONE: CPT | Performed by: PSYCHIATRY & NEUROLOGY

## 2017-06-26 PROCEDURE — 87106 FUNGI IDENTIFICATION YEAST: CPT | Performed by: PSYCHIATRY & NEUROLOGY

## 2017-06-26 PROCEDURE — 85025 COMPLETE CBC W/AUTO DIFF WBC: CPT | Performed by: INTERNAL MEDICINE

## 2017-06-26 PROCEDURE — 25810000003 DEXTROSE-NACL PER 500 ML: Performed by: INTERNAL MEDICINE

## 2017-06-26 PROCEDURE — 94799 UNLISTED PULMONARY SVC/PX: CPT

## 2017-06-26 PROCEDURE — 87206 SMEAR FLUORESCENT/ACID STAI: CPT | Performed by: FAMILY MEDICINE

## 2017-06-26 PROCEDURE — 87206 SMEAR FLUORESCENT/ACID STAI: CPT | Performed by: PSYCHIATRY & NEUROLOGY

## 2017-06-26 PROCEDURE — 82945 GLUCOSE OTHER FLUID: CPT | Performed by: PSYCHIATRY & NEUROLOGY

## 2017-06-26 PROCEDURE — 62270 DX LMBR SPI PNXR: CPT | Performed by: PSYCHIATRY & NEUROLOGY

## 2017-06-26 PROCEDURE — 87116 MYCOBACTERIA CULTURE: CPT | Performed by: FAMILY MEDICINE

## 2017-06-26 PROCEDURE — 82042 OTHER SOURCE ALBUMIN QUAN EA: CPT | Performed by: PSYCHIATRY & NEUROLOGY

## 2017-06-26 PROCEDURE — 87205 SMEAR GRAM STAIN: CPT | Performed by: PSYCHIATRY & NEUROLOGY

## 2017-06-26 PROCEDURE — 97162 PT EVAL MOD COMPLEX 30 MIN: CPT

## 2017-06-26 PROCEDURE — 94760 N-INVAS EAR/PLS OXIMETRY 1: CPT

## 2017-06-26 PROCEDURE — 86592 SYPHILIS TEST NON-TREP QUAL: CPT | Performed by: PSYCHIATRY & NEUROLOGY

## 2017-06-26 PROCEDURE — 84157 ASSAY OF PROTEIN OTHER: CPT | Performed by: PSYCHIATRY & NEUROLOGY

## 2017-06-26 PROCEDURE — 009U3ZX DRAINAGE OF SPINAL CANAL, PERCUTANEOUS APPROACH, DIAGNOSTIC: ICD-10-PCS | Performed by: PSYCHIATRY & NEUROLOGY

## 2017-06-26 PROCEDURE — 87102 FUNGUS ISOLATION CULTURE: CPT | Performed by: PSYCHIATRY & NEUROLOGY

## 2017-06-26 PROCEDURE — 25010000002 THIAMINE PER 100 MG: Performed by: FAMILY MEDICINE

## 2017-06-26 PROCEDURE — 87798 DETECT AGENT NOS DNA AMP: CPT | Performed by: PSYCHIATRY & NEUROLOGY

## 2017-06-26 PROCEDURE — 99233 SBSQ HOSP IP/OBS HIGH 50: CPT | Performed by: PSYCHIATRY & NEUROLOGY

## 2017-06-26 PROCEDURE — 25010000002 DIPHENHYDRAMINE PER 50 MG: Performed by: INTERNAL MEDICINE

## 2017-06-26 PROCEDURE — 87186 SC STD MICRODIL/AGAR DIL: CPT | Performed by: PSYCHIATRY & NEUROLOGY

## 2017-06-26 PROCEDURE — 87327 CRYPTOCOCCUS NEOFORM AG IA: CPT | Performed by: PSYCHIATRY & NEUROLOGY

## 2017-06-26 PROCEDURE — 82040 ASSAY OF SERUM ALBUMIN: CPT | Performed by: PSYCHIATRY & NEUROLOGY

## 2017-06-26 PROCEDURE — 87529 HSV DNA AMP PROBE: CPT | Performed by: PSYCHIATRY & NEUROLOGY

## 2017-06-26 RX ORDER — MEPERIDINE HYDROCHLORIDE 25 MG/ML
6.25 INJECTION INTRAMUSCULAR; INTRAVENOUS; SUBCUTANEOUS EVERY 24 HOURS
Status: DISCONTINUED | OUTPATIENT
Start: 2017-06-26 | End: 2017-06-27

## 2017-06-26 RX ORDER — CYCLOBENZAPRINE HCL 10 MG
10 TABLET ORAL 3 TIMES DAILY PRN
Status: DISCONTINUED | OUTPATIENT
Start: 2017-06-26 | End: 2017-06-30 | Stop reason: HOSPADM

## 2017-06-26 RX ORDER — LIDOCAINE HYDROCHLORIDE 10 MG/ML
0.5 INJECTION, SOLUTION EPIDURAL; INFILTRATION; INTRACAUDAL; PERINEURAL ONCE
Status: COMPLETED | OUTPATIENT
Start: 2017-06-26 | End: 2017-06-26

## 2017-06-26 RX ORDER — POTASSIUM CHLORIDE 750 MG/1
40 CAPSULE, EXTENDED RELEASE ORAL ONCE
Status: COMPLETED | OUTPATIENT
Start: 2017-06-26 | End: 2017-06-26

## 2017-06-26 RX ORDER — FLUCYTOSINE 500 MG/1
1500 CAPSULE ORAL EVERY 6 HOURS SCHEDULED
Status: DISCONTINUED | OUTPATIENT
Start: 2017-06-26 | End: 2017-06-30 | Stop reason: HOSPADM

## 2017-06-26 RX ORDER — SODIUM CHLORIDE AND POTASSIUM CHLORIDE 150; 900 MG/100ML; MG/100ML
100 INJECTION, SOLUTION INTRAVENOUS CONTINUOUS
Status: DISCONTINUED | OUTPATIENT
Start: 2017-06-26 | End: 2017-06-30 | Stop reason: HOSPADM

## 2017-06-26 RX ORDER — MEPERIDINE HYDROCHLORIDE 25 MG/ML
6.25 INJECTION INTRAMUSCULAR; INTRAVENOUS; SUBCUTANEOUS ONCE
Status: COMPLETED | OUTPATIENT
Start: 2017-06-26 | End: 2017-06-26

## 2017-06-26 RX ADMIN — Medication 10 ML: at 20:25

## 2017-06-26 RX ADMIN — ACETAMINOPHEN 650 MG: 325 TABLET, FILM COATED ORAL at 00:44

## 2017-06-26 RX ADMIN — DESMOPRESSIN ACETATE 80 MG: 0.2 TABLET ORAL at 20:26

## 2017-06-26 RX ADMIN — MEPERIDINE HYDROCHLORIDE 6.25 MG: 25 INJECTION, SOLUTION INTRAMUSCULAR; INTRAVENOUS; SUBCUTANEOUS at 21:31

## 2017-06-26 RX ADMIN — AMPHOTERICIN B 240 MG: 50 INJECTION, POWDER, LYOPHILIZED, FOR SOLUTION INTRAVENOUS at 20:30

## 2017-06-26 RX ADMIN — Medication 10 ML: at 21:34

## 2017-06-26 RX ADMIN — VALPROATE SODIUM 500 MG: 100 INJECTION, SOLUTION INTRAVENOUS at 12:08

## 2017-06-26 RX ADMIN — DEXTROSE AND SODIUM CHLORIDE 125 ML/HR: 5; 900 INJECTION, SOLUTION INTRAVENOUS at 04:20

## 2017-06-26 RX ADMIN — DIPHENHYDRAMINE HYDROCHLORIDE 12.5 MG: 50 INJECTION, SOLUTION INTRAMUSCULAR; INTRAVENOUS at 20:04

## 2017-06-26 RX ADMIN — THIAMINE HYDROCHLORIDE 125 ML/HR: 100 INJECTION, SOLUTION INTRAMUSCULAR; INTRAVENOUS at 18:46

## 2017-06-26 RX ADMIN — ASPIRIN 81 MG: 81 TABLET ORAL at 08:06

## 2017-06-26 RX ADMIN — MEPERIDINE HYDROCHLORIDE 6.25 MG: 25 INJECTION, SOLUTION INTRAMUSCULAR; INTRAVENOUS; SUBCUTANEOUS at 20:24

## 2017-06-26 RX ADMIN — POTASSIUM CHLORIDE 40 MEQ: 750 CAPSULE, EXTENDED RELEASE ORAL at 18:54

## 2017-06-26 RX ADMIN — MAGNESIUM HYDROXIDE 10 ML: 2400 SUSPENSION ORAL at 10:59

## 2017-06-26 RX ADMIN — Medication 10 ML: at 21:27

## 2017-06-26 RX ADMIN — ACETAMINOPHEN 650 MG: 325 TABLET, FILM COATED ORAL at 12:05

## 2017-06-26 RX ADMIN — CYCLOBENZAPRINE 10 MG: 10 TABLET, FILM COATED ORAL at 21:33

## 2017-06-26 RX ADMIN — ONDANSETRON 4 MG: 2 INJECTION INTRAMUSCULAR; INTRAVENOUS at 21:27

## 2017-06-26 RX ADMIN — LISINOPRIL 20 MG: 20 TABLET ORAL at 08:06

## 2017-06-26 RX ADMIN — BISACODYL 5 MG: 5 TABLET, COATED ORAL at 10:59

## 2017-06-26 RX ADMIN — ACETAMINOPHEN 650 MG: 325 TABLET, FILM COATED ORAL at 04:20

## 2017-06-26 RX ADMIN — PANTOPRAZOLE SODIUM 40 MG: 40 TABLET, DELAYED RELEASE ORAL at 04:21

## 2017-06-26 RX ADMIN — LIDOCAINE HYDROCHLORIDE 0.5 ML: 10 INJECTION, SOLUTION EPIDURAL; INFILTRATION; INTRACAUDAL; PERINEURAL at 18:06

## 2017-06-27 LAB
ALBUMIN SERPL-MCNC: 3.2 G/DL (ref 3.5–5)
ALBUMIN/GLOB SERPL: 1.1 G/DL (ref 1.1–2.5)
ALP SERPL-CCNC: 63 U/L (ref 24–120)
ALT SERPL W P-5'-P-CCNC: 38 U/L (ref 0–54)
ANA SER QL: NEGATIVE
ANION GAP SERPL CALCULATED.3IONS-SCNC: 7 MMOL/L (ref 4–13)
AST SERPL-CCNC: 35 U/L (ref 7–45)
BASOPHILS # BLD AUTO: 0.01 10*3/MM3 (ref 0–0.2)
BASOPHILS NFR BLD AUTO: 0.1 % (ref 0–2)
BILIRUB SERPL-MCNC: 1.3 MG/DL (ref 0.1–1)
BUN BLD-MCNC: 10 MG/DL (ref 5–21)
BUN/CREAT SERPL: 12.2 (ref 7–25)
CALCIUM SPEC-SCNC: 8.4 MG/DL (ref 8.4–10.4)
CHLORIDE SERPL-SCNC: 95 MMOL/L (ref 98–110)
CO2 SERPL-SCNC: 25 MMOL/L (ref 24–31)
CREAT BLD-MCNC: 0.82 MG/DL (ref 0.5–1.4)
DEPRECATED RDW RBC AUTO: 40.4 FL (ref 40–54)
EOSINOPHIL # BLD AUTO: 0 10*3/MM3 (ref 0–0.7)
EOSINOPHIL NFR BLD AUTO: 0 % (ref 0–4)
ERYTHROCYTE [DISTWIDTH] IN BLOOD BY AUTOMATED COUNT: 12.2 % (ref 12–15)
GFR SERPL CREATININE-BSD FRML MDRD: 93 ML/MIN/1.73
GLOBULIN UR ELPH-MCNC: 2.8 GM/DL
GLUCOSE BLD-MCNC: 147 MG/DL (ref 70–100)
HCT VFR BLD AUTO: 38.7 % (ref 40–52)
HGB BLD-MCNC: 13.9 G/DL (ref 14–18)
IMM GRANULOCYTES # BLD: 0.1 10*3/MM3 (ref 0–0.03)
IMM GRANULOCYTES NFR BLD: 1 % (ref 0–5)
LYMPHOCYTES # BLD AUTO: 0.79 10*3/MM3 (ref 0.72–4.86)
LYMPHOCYTES NFR BLD AUTO: 7.6 % (ref 15–45)
Lab: NORMAL
MAGNESIUM SERPL-MCNC: 2 MG/DL (ref 1.4–2.2)
MCH RBC QN AUTO: 32.5 PG (ref 28–32)
MCHC RBC AUTO-ENTMCNC: 35.9 G/DL (ref 33–36)
MCV RBC AUTO: 90.4 FL (ref 82–95)
MONOCYTES # BLD AUTO: 0.68 10*3/MM3 (ref 0.19–1.3)
MONOCYTES NFR BLD AUTO: 6.6 % (ref 4–12)
NEUTROPHILS # BLD AUTO: 8.8 10*3/MM3 (ref 1.87–8.4)
NEUTROPHILS NFR BLD AUTO: 84.7 % (ref 39–78)
PLATELET # BLD AUTO: 190 10*3/MM3 (ref 130–400)
PMV BLD AUTO: 9.8 FL (ref 6–12)
POTASSIUM BLD-SCNC: 4 MMOL/L (ref 3.5–5.3)
PROT SERPL-MCNC: 6 G/DL (ref 6.3–8.7)
R RICKETTSI IGM TITR SER: 1.65 INDEX (ref 0–0.89)
RBC # BLD AUTO: 4.28 10*6/MM3 (ref 4.8–5.9)
RICK SF IGG TITR SER IF: NEGATIVE {TITER}
SODIUM BLD-SCNC: 127 MMOL/L (ref 135–145)
WBC NRBC COR # BLD: 10.38 10*3/MM3 (ref 4.8–10.8)

## 2017-06-27 PROCEDURE — 25810000003 SODIUM CHLORIDE 0.9 % WITH KCL 20 MEQ 20-0.9 MEQ/L-% SOLUTION: Performed by: FAMILY MEDICINE

## 2017-06-27 PROCEDURE — 99233 SBSQ HOSP IP/OBS HIGH 50: CPT | Performed by: PSYCHIATRY & NEUROLOGY

## 2017-06-27 PROCEDURE — 25010000002 AMPHOTERICIN B LIPOSOME PER 10 MG: Performed by: INTERNAL MEDICINE

## 2017-06-27 PROCEDURE — 97116 GAIT TRAINING THERAPY: CPT

## 2017-06-27 PROCEDURE — 92526 ORAL FUNCTION THERAPY: CPT

## 2017-06-27 PROCEDURE — 25810000003 DEXTROSE-NACL PER 500 ML: Performed by: FAMILY MEDICINE

## 2017-06-27 PROCEDURE — 97535 SELF CARE MNGMENT TRAINING: CPT

## 2017-06-27 PROCEDURE — 83735 ASSAY OF MAGNESIUM: CPT | Performed by: INTERNAL MEDICINE

## 2017-06-27 PROCEDURE — 25010000002 THIAMINE PER 100 MG: Performed by: FAMILY MEDICINE

## 2017-06-27 PROCEDURE — 25010000002 DIPHENHYDRAMINE PER 50 MG: Performed by: INTERNAL MEDICINE

## 2017-06-27 PROCEDURE — 85025 COMPLETE CBC W/AUTO DIFF WBC: CPT | Performed by: FAMILY MEDICINE

## 2017-06-27 PROCEDURE — 80053 COMPREHEN METABOLIC PANEL: CPT | Performed by: FAMILY MEDICINE

## 2017-06-27 RX ORDER — MEPERIDINE HYDROCHLORIDE 25 MG/ML
6.25 INJECTION INTRAMUSCULAR; INTRAVENOUS; SUBCUTANEOUS DAILY PRN
Status: DISCONTINUED | OUTPATIENT
Start: 2017-06-27 | End: 2017-06-29

## 2017-06-27 RX ORDER — PROMETHAZINE HYDROCHLORIDE 25 MG/ML
12.5 INJECTION, SOLUTION INTRAMUSCULAR; INTRAVENOUS EVERY 6 HOURS PRN
Status: DISCONTINUED | OUTPATIENT
Start: 2017-06-27 | End: 2017-06-30 | Stop reason: HOSPADM

## 2017-06-27 RX ORDER — MEGESTROL ACETATE 40 MG/ML
400 SUSPENSION ORAL DAILY
Status: DISCONTINUED | OUTPATIENT
Start: 2017-06-27 | End: 2017-06-28

## 2017-06-27 RX ORDER — TOLVAPTAN 15 MG/1
15 TABLET ORAL DAILY
Status: DISCONTINUED | OUTPATIENT
Start: 2017-06-27 | End: 2017-06-28

## 2017-06-27 RX ADMIN — NYSTATIN 500000 UNITS: 100000 SUSPENSION ORAL at 13:14

## 2017-06-27 RX ADMIN — ENALAPRILAT 0.62 MG: 1.25 INJECTION INTRAVENOUS at 00:47

## 2017-06-27 RX ADMIN — CYCLOBENZAPRINE 10 MG: 10 TABLET, FILM COATED ORAL at 22:55

## 2017-06-27 RX ADMIN — NYSTATIN 500000 UNITS: 100000 SUSPENSION ORAL at 22:12

## 2017-06-27 RX ADMIN — MEGESTROL ACETATE 400 MG: 40 SUSPENSION ORAL at 17:56

## 2017-06-27 RX ADMIN — TOLVAPTAN 15 MG: 15 TABLET ORAL at 17:56

## 2017-06-27 RX ADMIN — FLUCYTOSINE 1500 MG: 500 CAPSULE ORAL at 13:13

## 2017-06-27 RX ADMIN — ASPIRIN 81 MG: 81 TABLET ORAL at 08:49

## 2017-06-27 RX ADMIN — AMPHOTERICIN B 240 MG: 50 INJECTION, POWDER, LYOPHILIZED, FOR SOLUTION INTRAVENOUS at 22:55

## 2017-06-27 RX ADMIN — NYSTATIN 500000 UNITS: 100000 SUSPENSION ORAL at 17:56

## 2017-06-27 RX ADMIN — ACETAMINOPHEN 650 MG: 325 TABLET, FILM COATED ORAL at 00:15

## 2017-06-27 RX ADMIN — Medication 10 ML: at 00:46

## 2017-06-27 RX ADMIN — DIPHENHYDRAMINE HYDROCHLORIDE 12.5 MG: 50 INJECTION, SOLUTION INTRAMUSCULAR; INTRAVENOUS at 22:07

## 2017-06-27 RX ADMIN — PANTOPRAZOLE SODIUM 40 MG: 40 TABLET, DELAYED RELEASE ORAL at 05:53

## 2017-06-27 RX ADMIN — LISINOPRIL 20 MG: 20 TABLET ORAL at 08:49

## 2017-06-27 RX ADMIN — FLUCYTOSINE 1500 MG: 500 CAPSULE ORAL at 17:56

## 2017-06-27 RX ADMIN — IBUPROFEN 400 MG: 400 TABLET, FILM COATED ORAL at 16:03

## 2017-06-27 RX ADMIN — DESMOPRESSIN ACETATE 80 MG: 0.2 TABLET ORAL at 22:12

## 2017-06-27 RX ADMIN — POTASSIUM CHLORIDE AND SODIUM CHLORIDE 125 ML/HR: 900; 150 INJECTION, SOLUTION INTRAVENOUS at 05:53

## 2017-06-27 RX ADMIN — SODIUM CHLORIDE 500 ML: 9 INJECTION, SOLUTION INTRAVENOUS at 20:24

## 2017-06-27 RX ADMIN — THIAMINE HYDROCHLORIDE 125 ML/HR: 100 INJECTION, SOLUTION INTRAMUSCULAR; INTRAVENOUS at 09:55

## 2017-06-28 LAB
ALB CSF/SERPL: 39 {RATIO} (ref 0–8)
ALBUMIN CSF-MCNC: 137 MG/DL (ref 11–48)
ALBUMIN SERPL-MCNC: 3.5 G/DL (ref 3.6–4.8)
ANION GAP SERPL CALCULATED.3IONS-SCNC: 6 MMOL/L (ref 4–13)
BASOPHILS # BLD AUTO: 0.01 10*3/MM3 (ref 0–0.2)
BASOPHILS NFR BLD AUTO: 0.1 % (ref 0–2)
BUN BLD-MCNC: 13 MG/DL (ref 5–21)
BUN/CREAT SERPL: 11 (ref 7–25)
CALCIUM SPEC-SCNC: 9 MG/DL (ref 8.4–10.4)
CHLORIDE SERPL-SCNC: 102 MMOL/L (ref 98–110)
CO2 SERPL-SCNC: 28 MMOL/L (ref 24–31)
CREAT BLD-MCNC: 1.18 MG/DL (ref 0.5–1.4)
DEPRECATED RDW RBC AUTO: 43.1 FL (ref 40–54)
EOSINOPHIL # BLD AUTO: 0.07 10*3/MM3 (ref 0–0.7)
EOSINOPHIL NFR BLD AUTO: 0.9 % (ref 0–4)
ERYTHROCYTE [DISTWIDTH] IN BLOOD BY AUTOMATED COUNT: 12.7 % (ref 12–15)
GFR SERPL CREATININE-BSD FRML MDRD: 61 ML/MIN/1.73
GLUCOSE BLD-MCNC: 108 MG/DL (ref 70–100)
HCT VFR BLD AUTO: 41.3 % (ref 40–52)
HGB BLD-MCNC: 14.2 G/DL (ref 14–18)
HIV 1+2 AB+HIV1 P24 AG SERPL QL IA: NORMAL
HIV1+2 AB SER QL: NEGATIVE
IGG CSF-MCNC: 32.6 MG/DL (ref 0–8.6)
IGG SERPL-MCNC: 820 MG/DL (ref 700–1600)
IGG SYNTH RATE SER+CSF CALC-MRATE: 90.5 MG/DAY
IGG/ALB CLEAR SER+CSF-RTO: 1 (ref 0–0.7)
IGG/ALB CSF: 0.24 {RATIO} (ref 0–0.25)
IMM GRANULOCYTES # BLD: 0.07 10*3/MM3 (ref 0–0.03)
IMM GRANULOCYTES NFR BLD: 0.9 % (ref 0–5)
LYMPHOCYTES # BLD AUTO: 1.17 10*3/MM3 (ref 0.72–4.86)
LYMPHOCYTES NFR BLD AUTO: 15.1 % (ref 15–45)
MAGNESIUM SERPL-MCNC: 2.4 MG/DL (ref 1.4–2.2)
MBP CSF-MCNC: 5.4 NG/ML (ref 0–1.2)
MCH RBC QN AUTO: 32.1 PG (ref 28–32)
MCHC RBC AUTO-ENTMCNC: 34.4 G/DL (ref 33–36)
MCV RBC AUTO: 93.2 FL (ref 82–95)
MONOCYTES # BLD AUTO: 1.09 10*3/MM3 (ref 0.19–1.3)
MONOCYTES NFR BLD AUTO: 14.1 % (ref 4–12)
NEUTROPHILS # BLD AUTO: 5.33 10*3/MM3 (ref 1.87–8.4)
NEUTROPHILS NFR BLD AUTO: 68.9 % (ref 39–78)
OLIGOCLONAL BANDS.IT SER+CSF QL: ABNORMAL
PLATELET # BLD AUTO: 204 10*3/MM3 (ref 130–400)
PMV BLD AUTO: 10.5 FL (ref 6–12)
POTASSIUM BLD-SCNC: 4.2 MMOL/L (ref 3.5–5.3)
RBC # BLD AUTO: 4.43 10*6/MM3 (ref 4.8–5.9)
SODIUM BLD-SCNC: 136 MMOL/L (ref 135–145)
VIT B1 BLD-SCNC: 256.2 NMOL/L (ref 66.5–200)
WBC NRBC COR # BLD: 7.74 10*3/MM3 (ref 4.8–10.8)
WEST NILE VIRUS ANTIBODY, IGM: NEGATIVE
WNV IGG SER-ACNC: POSITIVE

## 2017-06-28 PROCEDURE — 25010000002 DIPHENHYDRAMINE PER 50 MG: Performed by: INTERNAL MEDICINE

## 2017-06-28 PROCEDURE — 92526 ORAL FUNCTION THERAPY: CPT

## 2017-06-28 PROCEDURE — 25810000003 SODIUM CHLORIDE 0.9 % WITH KCL 20 MEQ 20-0.9 MEQ/L-% SOLUTION: Performed by: FAMILY MEDICINE

## 2017-06-28 PROCEDURE — 83735 ASSAY OF MAGNESIUM: CPT | Performed by: INTERNAL MEDICINE

## 2017-06-28 PROCEDURE — 25010000002 THIAMINE PER 100 MG: Performed by: FAMILY MEDICINE

## 2017-06-28 PROCEDURE — 80048 BASIC METABOLIC PNL TOTAL CA: CPT | Performed by: FAMILY MEDICINE

## 2017-06-28 PROCEDURE — 92507 TX SP LANG VOICE COMM INDIV: CPT

## 2017-06-28 PROCEDURE — 25810000003 DEXTROSE-NACL PER 500 ML: Performed by: FAMILY MEDICINE

## 2017-06-28 PROCEDURE — 25010000002 AMPHOTERICIN B LIPOSOME PER 10 MG: Performed by: INTERNAL MEDICINE

## 2017-06-28 PROCEDURE — G0432 EIA HIV-1/HIV-2 SCREEN: HCPCS | Performed by: INTERNAL MEDICINE

## 2017-06-28 PROCEDURE — 85025 COMPLETE CBC W/AUTO DIFF WBC: CPT | Performed by: FAMILY MEDICINE

## 2017-06-28 PROCEDURE — 99232 SBSQ HOSP IP/OBS MODERATE 35: CPT | Performed by: PSYCHIATRY & NEUROLOGY

## 2017-06-28 RX ORDER — MEGESTROL ACETATE 40 MG/ML
800 SUSPENSION ORAL DAILY
Status: DISCONTINUED | OUTPATIENT
Start: 2017-06-29 | End: 2017-06-30 | Stop reason: HOSPADM

## 2017-06-28 RX ADMIN — FLUCYTOSINE 1500 MG: 500 CAPSULE ORAL at 00:05

## 2017-06-28 RX ADMIN — NYSTATIN 500000 UNITS: 100000 SUSPENSION ORAL at 12:44

## 2017-06-28 RX ADMIN — MEGESTROL ACETATE 400 MG: 40 SUSPENSION ORAL at 08:34

## 2017-06-28 RX ADMIN — DESMOPRESSIN ACETATE 80 MG: 0.2 TABLET ORAL at 20:49

## 2017-06-28 RX ADMIN — AMPHOTERICIN B 240 MG: 50 INJECTION, POWDER, LYOPHILIZED, FOR SOLUTION INTRAVENOUS at 20:48

## 2017-06-28 RX ADMIN — TOLVAPTAN 15 MG: 15 TABLET ORAL at 08:34

## 2017-06-28 RX ADMIN — SODIUM CHLORIDE 500 ML: 9 INJECTION, SOLUTION INTRAVENOUS at 18:00

## 2017-06-28 RX ADMIN — FLUCYTOSINE 1500 MG: 500 CAPSULE ORAL at 17:17

## 2017-06-28 RX ADMIN — THIAMINE HYDROCHLORIDE 125 ML/HR: 100 INJECTION, SOLUTION INTRAMUSCULAR; INTRAVENOUS at 08:34

## 2017-06-28 RX ADMIN — SODIUM CHLORIDE 500 ML: 9 INJECTION, SOLUTION INTRAVENOUS at 03:11

## 2017-06-28 RX ADMIN — ASPIRIN 81 MG: 81 TABLET ORAL at 08:34

## 2017-06-28 RX ADMIN — NYSTATIN 500000 UNITS: 100000 SUSPENSION ORAL at 20:49

## 2017-06-28 RX ADMIN — NYSTATIN 500000 UNITS: 100000 SUSPENSION ORAL at 08:35

## 2017-06-28 RX ADMIN — FLUCYTOSINE 1500 MG: 500 CAPSULE ORAL at 12:44

## 2017-06-28 RX ADMIN — PANTOPRAZOLE SODIUM 40 MG: 40 TABLET, DELAYED RELEASE ORAL at 07:51

## 2017-06-28 RX ADMIN — IBUPROFEN 400 MG: 400 TABLET, FILM COATED ORAL at 12:44

## 2017-06-28 RX ADMIN — POTASSIUM CHLORIDE AND SODIUM CHLORIDE 100 ML/HR: 900; 150 INJECTION, SOLUTION INTRAVENOUS at 05:20

## 2017-06-28 RX ADMIN — DIPHENHYDRAMINE HYDROCHLORIDE 12.5 MG: 50 INJECTION, SOLUTION INTRAMUSCULAR; INTRAVENOUS at 20:27

## 2017-06-28 RX ADMIN — LISINOPRIL 20 MG: 20 TABLET ORAL at 08:34

## 2017-06-28 RX ADMIN — FLUCYTOSINE 1500 MG: 500 CAPSULE ORAL at 07:51

## 2017-06-28 RX ADMIN — FLUCYTOSINE 1500 MG: 500 CAPSULE ORAL at 23:31

## 2017-06-28 RX ADMIN — NYSTATIN 500000 UNITS: 100000 SUSPENSION ORAL at 17:17

## 2017-06-29 PROBLEM — B45.9: Status: ACTIVE | Noted: 2017-06-29

## 2017-06-29 LAB
ANION GAP SERPL CALCULATED.3IONS-SCNC: 7 MMOL/L (ref 4–13)
B BURGDOR IGG PATRN SER IB-IMP: NEGATIVE
B BURGDOR IGM PATRN SER IB-IMP: NEGATIVE
BACTERIA BLD CULT: NORMAL
BACTERIA SPEC AEROBE CULT: ABNORMAL
BACTERIA SPEC AEROBE CULT: NORMAL
BASOPHILS # BLD AUTO: 0.02 10*3/MM3 (ref 0–0.2)
BASOPHILS NFR BLD AUTO: 0.3 % (ref 0–2)
BUN BLD-MCNC: 14 MG/DL (ref 5–21)
BUN/CREAT SERPL: 12.6 (ref 7–25)
CALCIUM SPEC-SCNC: 8.6 MG/DL (ref 8.4–10.4)
CHLORIDE SERPL-SCNC: 106 MMOL/L (ref 98–110)
CO2 SERPL-SCNC: 23 MMOL/L (ref 24–31)
CREAT BLD-MCNC: 1.11 MG/DL (ref 0.5–1.4)
DEPRECATED RDW RBC AUTO: 42.7 FL (ref 40–54)
EBV DNA SPEC QL NAA+PROBE: POSITIVE
EOSINOPHIL # BLD AUTO: 0.04 10*3/MM3 (ref 0–0.7)
EOSINOPHIL NFR BLD AUTO: 0.6 % (ref 0–4)
ERYTHROCYTE [DISTWIDTH] IN BLOOD BY AUTOMATED COUNT: 12.6 % (ref 12–15)
GFR SERPL CREATININE-BSD FRML MDRD: 65 ML/MIN/1.73
GLUCOSE BLD-MCNC: 108 MG/DL (ref 70–100)
GRAM STN SPEC: ABNORMAL
GRAM STN SPEC: ABNORMAL
HCT VFR BLD AUTO: 39 % (ref 40–52)
HGB BLD-MCNC: 13.5 G/DL (ref 14–18)
HSV1 DNA SPEC QL NAA+PROBE: NEGATIVE
HSV2 DNA SPEC QL NAA+PROBE: NEGATIVE
IMM GRANULOCYTES # BLD: 0.02 10*3/MM3 (ref 0–0.03)
IMM GRANULOCYTES NFR BLD: 0.3 % (ref 0–5)
LYMPHOCYTES # BLD AUTO: 1.28 10*3/MM3 (ref 0.72–4.86)
LYMPHOCYTES NFR BLD AUTO: 17.9 % (ref 15–45)
MAGNESIUM SERPL-MCNC: 2.1 MG/DL (ref 1.4–2.2)
MCH RBC QN AUTO: 32 PG (ref 28–32)
MCHC RBC AUTO-ENTMCNC: 34.6 G/DL (ref 33–36)
MCV RBC AUTO: 92.4 FL (ref 82–95)
MONOCYTES # BLD AUTO: 0.78 10*3/MM3 (ref 0.19–1.3)
MONOCYTES NFR BLD AUTO: 10.9 % (ref 4–12)
NEUTROPHILS # BLD AUTO: 5 10*3/MM3 (ref 1.87–8.4)
NEUTROPHILS NFR BLD AUTO: 70 % (ref 39–78)
P18 AB. IGG: ABNORMAL
P23 AB. IGG CSF: ABNORMAL
P23 AB. IGM CSF: ABNORMAL
P28 AB. IGG CSF: ABNORMAL
P30 AB. IGG CSF: ABNORMAL
P39 AB. IGG CSF: ABNORMAL
P39 AB. IGM CSF: ABNORMAL
P41 AB. IGG CSF: PRESENT
P41 AB. IGM CSF: ABNORMAL
P45 AB. IGG CSF: ABNORMAL
P58 AB. IGG CSF: ABNORMAL
P66 AB. IGG CSF: ABNORMAL
P93 AB. IGG CSF: ABNORMAL
PLATELET # BLD AUTO: 202 10*3/MM3 (ref 130–400)
PMV BLD AUTO: 10.3 FL (ref 6–12)
POTASSIUM BLD-SCNC: 3.6 MMOL/L (ref 3.5–5.3)
RBC # BLD AUTO: 4.22 10*6/MM3 (ref 4.8–5.9)
REAGIN AB CSF QL: NON REACTIVE
SODIUM BLD-SCNC: 136 MMOL/L (ref 135–145)
VZV IGM CSF-ACNC: 0 ISR
WBC NRBC COR # BLD: 7.14 10*3/MM3 (ref 4.8–10.8)

## 2017-06-29 PROCEDURE — 25810000003 SODIUM CHLORIDE 0.9 % WITH KCL 20 MEQ 20-0.9 MEQ/L-% SOLUTION: Performed by: FAMILY MEDICINE

## 2017-06-29 PROCEDURE — 99232 SBSQ HOSP IP/OBS MODERATE 35: CPT | Performed by: PSYCHIATRY & NEUROLOGY

## 2017-06-29 PROCEDURE — 25810000003 DEXTROSE-NACL PER 500 ML: Performed by: FAMILY MEDICINE

## 2017-06-29 PROCEDURE — 97530 THERAPEUTIC ACTIVITIES: CPT

## 2017-06-29 PROCEDURE — 92507 TX SP LANG VOICE COMM INDIV: CPT

## 2017-06-29 PROCEDURE — 97535 SELF CARE MNGMENT TRAINING: CPT

## 2017-06-29 PROCEDURE — 25010000002 THIAMINE PER 100 MG: Performed by: FAMILY MEDICINE

## 2017-06-29 PROCEDURE — G8978 MOBILITY CURRENT STATUS: HCPCS | Performed by: PHYSICAL THERAPIST

## 2017-06-29 PROCEDURE — 85025 COMPLETE CBC W/AUTO DIFF WBC: CPT | Performed by: FAMILY MEDICINE

## 2017-06-29 PROCEDURE — 83735 ASSAY OF MAGNESIUM: CPT | Performed by: INTERNAL MEDICINE

## 2017-06-29 PROCEDURE — 25010000002 AMPHOTERICIN B LIPOSOME PER 10 MG: Performed by: INTERNAL MEDICINE

## 2017-06-29 PROCEDURE — 25010000002 DIPHENHYDRAMINE PER 50 MG: Performed by: INTERNAL MEDICINE

## 2017-06-29 PROCEDURE — G8979 MOBILITY GOAL STATUS: HCPCS | Performed by: PHYSICAL THERAPIST

## 2017-06-29 PROCEDURE — 92526 ORAL FUNCTION THERAPY: CPT

## 2017-06-29 PROCEDURE — 80048 BASIC METABOLIC PNL TOTAL CA: CPT | Performed by: FAMILY MEDICINE

## 2017-06-29 PROCEDURE — 97164 PT RE-EVAL EST PLAN CARE: CPT

## 2017-06-29 PROCEDURE — 25010000002 MEPERIDINE PER 100 MG: Performed by: INTERNAL MEDICINE

## 2017-06-29 RX ORDER — POTASSIUM CHLORIDE 750 MG/1
40 CAPSULE, EXTENDED RELEASE ORAL DAILY
Status: DISCONTINUED | OUTPATIENT
Start: 2017-06-29 | End: 2017-06-30 | Stop reason: HOSPADM

## 2017-06-29 RX ADMIN — POTASSIUM CHLORIDE AND SODIUM CHLORIDE 100 ML/HR: 900; 150 INJECTION, SOLUTION INTRAVENOUS at 07:46

## 2017-06-29 RX ADMIN — MEGESTROL ACETATE 800 MG: 40 SUSPENSION ORAL at 09:28

## 2017-06-29 RX ADMIN — PANTOPRAZOLE SODIUM 40 MG: 40 TABLET, DELAYED RELEASE ORAL at 06:13

## 2017-06-29 RX ADMIN — IBUPROFEN 400 MG: 400 TABLET, FILM COATED ORAL at 04:50

## 2017-06-29 RX ADMIN — ASPIRIN 81 MG: 81 TABLET ORAL at 09:28

## 2017-06-29 RX ADMIN — FLUCYTOSINE 1500 MG: 500 CAPSULE ORAL at 23:48

## 2017-06-29 RX ADMIN — IBUPROFEN 400 MG: 400 TABLET, FILM COATED ORAL at 23:49

## 2017-06-29 RX ADMIN — FLUCYTOSINE 1500 MG: 500 CAPSULE ORAL at 06:13

## 2017-06-29 RX ADMIN — ACETAMINOPHEN 650 MG: 325 TABLET, FILM COATED ORAL at 03:55

## 2017-06-29 RX ADMIN — SODIUM CHLORIDE 500 ML: 9 INJECTION, SOLUTION INTRAVENOUS at 23:48

## 2017-06-29 RX ADMIN — THIAMINE HYDROCHLORIDE 125 ML/HR: 100 INJECTION, SOLUTION INTRAMUSCULAR; INTRAVENOUS at 09:28

## 2017-06-29 RX ADMIN — SODIUM CHLORIDE 500 ML: 9 INJECTION, SOLUTION INTRAVENOUS at 01:03

## 2017-06-29 RX ADMIN — MEPERIDINE HYDROCHLORIDE 6.25 MG: 25 INJECTION, SOLUTION INTRAMUSCULAR; INTRAVENOUS; SUBCUTANEOUS at 03:56

## 2017-06-29 RX ADMIN — NYSTATIN 500000 UNITS: 100000 SUSPENSION ORAL at 09:28

## 2017-06-29 RX ADMIN — DESMOPRESSIN ACETATE 80 MG: 0.2 TABLET ORAL at 20:16

## 2017-06-29 RX ADMIN — POTASSIUM CHLORIDE 40 MEQ: 750 CAPSULE, EXTENDED RELEASE ORAL at 09:34

## 2017-06-29 RX ADMIN — FLUCYTOSINE 1500 MG: 500 CAPSULE ORAL at 17:50

## 2017-06-29 RX ADMIN — Medication 10 ML: at 01:02

## 2017-06-29 RX ADMIN — NYSTATIN 500000 UNITS: 100000 SUSPENSION ORAL at 17:50

## 2017-06-29 RX ADMIN — SODIUM CHLORIDE 500 ML: 9 INJECTION, SOLUTION INTRAVENOUS at 17:50

## 2017-06-29 RX ADMIN — DIPHENHYDRAMINE HYDROCHLORIDE 12.5 MG: 50 INJECTION, SOLUTION INTRAMUSCULAR; INTRAVENOUS at 19:30

## 2017-06-29 RX ADMIN — NYSTATIN 500000 UNITS: 100000 SUSPENSION ORAL at 20:16

## 2017-06-29 RX ADMIN — FLUCYTOSINE 1500 MG: 500 CAPSULE ORAL at 12:41

## 2017-06-29 RX ADMIN — AMPHOTERICIN B 240 MG: 50 INJECTION, POWDER, LYOPHILIZED, FOR SOLUTION INTRAVENOUS at 19:53

## 2017-06-29 RX ADMIN — NYSTATIN 500000 UNITS: 100000 SUSPENSION ORAL at 12:41

## 2017-06-30 ENCOUNTER — HOSPITAL ENCOUNTER (INPATIENT)
Age: 72
LOS: 11 days | Discharge: HOME HEALTH | DRG: 097 | End: 2017-07-11
Attending: PSYCHIATRY & NEUROLOGY | Admitting: PSYCHIATRY & NEUROLOGY
Payer: MEDICARE

## 2017-06-30 VITALS
HEART RATE: 62 BPM | DIASTOLIC BLOOD PRESSURE: 89 MMHG | OXYGEN SATURATION: 97 % | TEMPERATURE: 98.1 F | BODY MASS INDEX: 23.7 KG/M2 | SYSTOLIC BLOOD PRESSURE: 139 MMHG | WEIGHT: 175 LBS | HEIGHT: 72 IN | RESPIRATION RATE: 18 BRPM

## 2017-06-30 DIAGNOSIS — B45.1: ICD-10-CM

## 2017-06-30 DIAGNOSIS — I63.9 ACUTE ISCHEMIC STROKE (HCC): Primary | ICD-10-CM

## 2017-06-30 LAB
ANION GAP SERPL CALCULATED.3IONS-SCNC: 7 MMOL/L (ref 4–13)
BASOPHILS # BLD AUTO: 0.01 10*3/MM3 (ref 0–0.2)
BASOPHILS NFR BLD AUTO: 0.1 % (ref 0–2)
BUN BLD-MCNC: 11 MG/DL (ref 5–21)
BUN/CREAT SERPL: 10.2 (ref 7–25)
CALCIUM SPEC-SCNC: 8.7 MG/DL (ref 8.4–10.4)
CHLORIDE SERPL-SCNC: 102 MMOL/L (ref 98–110)
CO2 SERPL-SCNC: 22 MMOL/L (ref 24–31)
CREAT BLD-MCNC: 1.08 MG/DL (ref 0.5–1.4)
DEPRECATED RDW RBC AUTO: 42.1 FL (ref 40–54)
EOSINOPHIL # BLD AUTO: 0.03 10*3/MM3 (ref 0–0.7)
EOSINOPHIL NFR BLD AUTO: 0.4 % (ref 0–4)
ERYTHROCYTE [DISTWIDTH] IN BLOOD BY AUTOMATED COUNT: 12.6 % (ref 12–15)
GFR SERPL CREATININE-BSD FRML MDRD: 68 ML/MIN/1.73
GLUCOSE BLD-MCNC: 108 MG/DL (ref 70–100)
HCT VFR BLD AUTO: 37.2 % (ref 40–52)
HGB BLD-MCNC: 13.1 G/DL (ref 14–18)
IMM GRANULOCYTES # BLD: 0.02 10*3/MM3 (ref 0–0.03)
IMM GRANULOCYTES NFR BLD: 0.3 % (ref 0–5)
LYMPHOCYTES # BLD AUTO: 1.09 10*3/MM3 (ref 0.72–4.86)
LYMPHOCYTES NFR BLD AUTO: 14 % (ref 15–45)
MAGNESIUM SERPL-MCNC: 1.7 MG/DL (ref 1.4–2.2)
MCH RBC QN AUTO: 32.2 PG (ref 28–32)
MCHC RBC AUTO-ENTMCNC: 35.2 G/DL (ref 33–36)
MCV RBC AUTO: 91.4 FL (ref 82–95)
MONOCYTES # BLD AUTO: 0.79 10*3/MM3 (ref 0.19–1.3)
MONOCYTES NFR BLD AUTO: 10.2 % (ref 4–12)
NEUTROPHILS # BLD AUTO: 5.82 10*3/MM3 (ref 1.87–8.4)
NEUTROPHILS NFR BLD AUTO: 75 % (ref 39–78)
PLATELET # BLD AUTO: 193 10*3/MM3 (ref 130–400)
PMV BLD AUTO: 9.9 FL (ref 6–12)
POTASSIUM BLD-SCNC: 3.8 MMOL/L (ref 3.5–5.3)
RBC # BLD AUTO: 4.07 10*6/MM3 (ref 4.8–5.9)
SODIUM BLD-SCNC: 131 MMOL/L (ref 135–145)
WBC NRBC COR # BLD: 7.76 10*3/MM3 (ref 4.8–10.8)

## 2017-06-30 PROCEDURE — 25810000003 DEXTROSE-NACL PER 500 ML: Performed by: FAMILY MEDICINE

## 2017-06-30 PROCEDURE — 97116 GAIT TRAINING THERAPY: CPT

## 2017-06-30 PROCEDURE — 97110 THERAPEUTIC EXERCISES: CPT

## 2017-06-30 PROCEDURE — 85025 COMPLETE CBC W/AUTO DIFF WBC: CPT | Performed by: FAMILY MEDICINE

## 2017-06-30 PROCEDURE — 1180000000 HC REHAB R&B

## 2017-06-30 PROCEDURE — 83735 ASSAY OF MAGNESIUM: CPT | Performed by: INTERNAL MEDICINE

## 2017-06-30 PROCEDURE — 6370000000 HC RX 637 (ALT 250 FOR IP): Performed by: PSYCHIATRY & NEUROLOGY

## 2017-06-30 PROCEDURE — 25810000003 SODIUM CHLORIDE 0.9 % WITH KCL 20 MEQ 20-0.9 MEQ/L-% SOLUTION: Performed by: FAMILY MEDICINE

## 2017-06-30 PROCEDURE — 97535 SELF CARE MNGMENT TRAINING: CPT

## 2017-06-30 PROCEDURE — 25010000002 THIAMINE PER 100 MG: Performed by: FAMILY MEDICINE

## 2017-06-30 PROCEDURE — 2580000003 HC RX 258: Performed by: PSYCHIATRY & NEUROLOGY

## 2017-06-30 PROCEDURE — 6360000002 HC RX W HCPCS: Performed by: PSYCHIATRY & NEUROLOGY

## 2017-06-30 PROCEDURE — 80048 BASIC METABOLIC PNL TOTAL CA: CPT | Performed by: FAMILY MEDICINE

## 2017-06-30 RX ORDER — LISINOPRIL 20 MG/1
20 TABLET ORAL DAILY
Status: DISCONTINUED | OUTPATIENT
Start: 2017-07-01 | End: 2017-07-11 | Stop reason: HOSPADM

## 2017-06-30 RX ORDER — DEXTROSE MONOHYDRATE 50 MG/ML
INJECTION, SOLUTION INTRAVENOUS 2 TIMES DAILY
Status: DISCONTINUED | OUTPATIENT
Start: 2017-06-30 | End: 2017-07-11 | Stop reason: HOSPADM

## 2017-06-30 RX ORDER — PANTOPRAZOLE SODIUM 40 MG/1
40 TABLET, DELAYED RELEASE ORAL DAILY
Status: DISCONTINUED | OUTPATIENT
Start: 2017-06-30 | End: 2017-07-11 | Stop reason: HOSPADM

## 2017-06-30 RX ORDER — PROMETHAZINE HYDROCHLORIDE 25 MG/ML
12.5 INJECTION, SOLUTION INTRAMUSCULAR; INTRAVENOUS EVERY 6 HOURS PRN
Start: 2017-06-30 | End: 2017-08-10

## 2017-06-30 RX ORDER — MEGESTROL ACETATE 40 MG/ML
800 SUSPENSION ORAL DAILY
Status: ON HOLD | COMMUNITY
End: 2017-07-11 | Stop reason: HOSPADM

## 2017-06-30 RX ORDER — PROMETHAZINE HYDROCHLORIDE 25 MG/ML
12.5 INJECTION, SOLUTION INTRAMUSCULAR; INTRAVENOUS EVERY 6 HOURS PRN
Status: ON HOLD | COMMUNITY
End: 2017-07-11 | Stop reason: HOSPADM

## 2017-06-30 RX ORDER — ATORVASTATIN CALCIUM 40 MG/1
40 TABLET, FILM COATED ORAL NIGHTLY
Status: DISCONTINUED | OUTPATIENT
Start: 2017-06-30 | End: 2017-07-11 | Stop reason: HOSPADM

## 2017-06-30 RX ORDER — ENALAPRILAT 2.5 MG/2ML
1.25 INJECTION INTRAVENOUS EVERY 6 HOURS PRN
Status: DISCONTINUED | OUTPATIENT
Start: 2017-06-30 | End: 2017-07-11 | Stop reason: HOSPADM

## 2017-06-30 RX ORDER — PANTOPRAZOLE SODIUM 40 MG/1
40 TABLET, DELAYED RELEASE ORAL DAILY
Start: 2017-06-30 | End: 2017-08-10

## 2017-06-30 RX ORDER — ONDANSETRON 2 MG/ML
4 INJECTION INTRAMUSCULAR; INTRAVENOUS EVERY 6 HOURS PRN
Status: ON HOLD | COMMUNITY
End: 2017-07-11 | Stop reason: HOSPADM

## 2017-06-30 RX ORDER — LORAZEPAM 2 MG/ML
0.5 INJECTION INTRAMUSCULAR EVERY 6 HOURS PRN
Status: ON HOLD | COMMUNITY
End: 2017-07-11 | Stop reason: HOSPADM

## 2017-06-30 RX ORDER — SODIUM CHLORIDE AND POTASSIUM CHLORIDE 150; 900 MG/100ML; MG/100ML
100 INJECTION, SOLUTION INTRAVENOUS CONTINUOUS
Start: 2017-06-30 | End: 2017-08-10

## 2017-06-30 RX ORDER — SODIUM CHLORIDE AND POTASSIUM CHLORIDE .9; .15 G/100ML; G/100ML
20 SOLUTION INTRAVENOUS CONTINUOUS
Status: DISCONTINUED | OUTPATIENT
Start: 2017-06-30 | End: 2017-07-01

## 2017-06-30 RX ORDER — LORAZEPAM 2 MG/ML
0.5 INJECTION INTRAMUSCULAR EVERY 6 HOURS PRN
Start: 2017-06-30 | End: 2017-07-04

## 2017-06-30 RX ORDER — ATORVASTATIN CALCIUM 80 MG/1
40 TABLET, FILM COATED ORAL NIGHTLY
COMMUNITY
End: 2017-07-21

## 2017-06-30 RX ORDER — ACETAMINOPHEN 325 MG/1
650 TABLET ORAL EVERY 4 HOURS PRN
Status: DISCONTINUED | OUTPATIENT
Start: 2017-06-30 | End: 2017-07-11 | Stop reason: HOSPADM

## 2017-06-30 RX ORDER — POLYETHYLENE GLYCOL 3350 17 G/17G
17 POWDER, FOR SOLUTION ORAL DAILY
Status: DISCONTINUED | OUTPATIENT
Start: 2017-06-30 | End: 2017-07-11 | Stop reason: HOSPADM

## 2017-06-30 RX ORDER — LABETALOL HYDROCHLORIDE 5 MG/ML
10 INJECTION, SOLUTION INTRAVENOUS EVERY 10 MIN PRN
Status: ON HOLD | COMMUNITY
End: 2017-07-11 | Stop reason: HOSPADM

## 2017-06-30 RX ORDER — ACETAMINOPHEN 325 MG/1
650 TABLET ORAL EVERY 4 HOURS PRN
COMMUNITY
End: 2017-07-18

## 2017-06-30 RX ORDER — LABETALOL HYDROCHLORIDE 5 MG/ML
10 INJECTION, SOLUTION INTRAVENOUS EVERY 10 MIN PRN
Status: DISCONTINUED | OUTPATIENT
Start: 2017-06-30 | End: 2017-07-11 | Stop reason: HOSPADM

## 2017-06-30 RX ORDER — ACETAMINOPHEN 325 MG/1
650 TABLET ORAL EVERY 4 HOURS PRN
Refills: 0
Start: 2017-06-30 | End: 2017-08-10

## 2017-06-30 RX ORDER — ENALAPRILAT 2.5 MG/2ML
1.25 INJECTION INTRAVENOUS EVERY 6 HOURS PRN
Status: ON HOLD | COMMUNITY
End: 2017-07-11 | Stop reason: HOSPADM

## 2017-06-30 RX ORDER — FLUCYTOSINE 500 MG/1
1500 CAPSULE ORAL EVERY 6 HOURS
Status: ON HOLD | COMMUNITY
End: 2017-07-11 | Stop reason: HOSPADM

## 2017-06-30 RX ORDER — ENALAPRILAT 2.5 MG/2ML
0.62 INJECTION INTRAVENOUS EVERY 6 HOURS PRN
Start: 2017-06-30 | End: 2017-08-10

## 2017-06-30 RX ORDER — SODIUM PHOSPHATE, DIBASIC AND SODIUM PHOSPHATE, MONOBASIC 7; 19 G/133ML; G/133ML
1 ENEMA RECTAL
Status: ACTIVE | OUTPATIENT
Start: 2017-06-30 | End: 2017-06-30

## 2017-06-30 RX ORDER — ONDANSETRON 2 MG/ML
4 INJECTION INTRAMUSCULAR; INTRAVENOUS EVERY 6 HOURS PRN
Status: DISCONTINUED | OUTPATIENT
Start: 2017-06-30 | End: 2017-07-11 | Stop reason: HOSPADM

## 2017-06-30 RX ORDER — IBUPROFEN 400 MG/1
400 TABLET ORAL EVERY 4 HOURS PRN
Status: DISCONTINUED | OUTPATIENT
Start: 2017-06-30 | End: 2017-07-07

## 2017-06-30 RX ORDER — PANTOPRAZOLE SODIUM 40 MG/1
40 TABLET, DELAYED RELEASE ORAL DAILY
COMMUNITY
End: 2017-07-18

## 2017-06-30 RX ORDER — AMPHOTERICIN B 50 MG/1
240 INJECTABLE, LIPOSOMAL INTRAVENOUS DAILY
Status: DISCONTINUED | OUTPATIENT
Start: 2017-07-01 | End: 2017-06-30 | Stop reason: SDUPTHER

## 2017-06-30 RX ORDER — ATORVASTATIN CALCIUM 80 MG/1
40 TABLET, FILM COATED ORAL NIGHTLY
Start: 2017-06-30 | End: 2017-08-10

## 2017-06-30 RX ORDER — AMPHOTERICIN B 50 MG/1
240 INJECTABLE, LIPOSOMAL INTRAVENOUS DAILY
Status: ON HOLD | COMMUNITY
Start: 2017-07-01 | End: 2017-07-11 | Stop reason: HOSPADM

## 2017-06-30 RX ORDER — BISACODYL 5 MG/1
5 TABLET, DELAYED RELEASE ORAL DAILY PRN
Refills: 0
Start: 2017-06-30 | End: 2017-08-10

## 2017-06-30 RX ORDER — MEGESTROL ACETATE 40 MG/ML
800 SUSPENSION ORAL DAILY
Start: 2017-06-30 | End: 2017-08-10

## 2017-06-30 RX ORDER — MEGESTROL ACETATE 40 MG/ML
800 SUSPENSION ORAL DAILY
Status: DISCONTINUED | OUTPATIENT
Start: 2017-06-30 | End: 2017-07-11 | Stop reason: HOSPADM

## 2017-06-30 RX ORDER — PROMETHAZINE HYDROCHLORIDE 25 MG/ML
12.5 INJECTION, SOLUTION INTRAMUSCULAR; INTRAVENOUS EVERY 6 HOURS PRN
Status: DISCONTINUED | OUTPATIENT
Start: 2017-06-30 | End: 2017-07-11 | Stop reason: HOSPADM

## 2017-06-30 RX ORDER — ASPIRIN 81 MG/1
81 TABLET, CHEWABLE ORAL DAILY
Status: DISCONTINUED | OUTPATIENT
Start: 2017-06-30 | End: 2017-07-06

## 2017-06-30 RX ORDER — LORAZEPAM 2 MG/ML
0.5 INJECTION INTRAMUSCULAR EVERY 6 HOURS PRN
Status: DISCONTINUED | OUTPATIENT
Start: 2017-06-30 | End: 2017-07-11 | Stop reason: HOSPADM

## 2017-06-30 RX ORDER — 0.9 % SODIUM CHLORIDE 0.9 %
500 INTRAVENOUS SOLUTION INTRAVENOUS 2 TIMES DAILY
Status: DISCONTINUED | OUTPATIENT
Start: 2017-06-30 | End: 2017-07-11 | Stop reason: HOSPADM

## 2017-06-30 RX ORDER — ONDANSETRON 2 MG/ML
4 INJECTION INTRAMUSCULAR; INTRAVENOUS EVERY 6 HOURS PRN
Start: 2017-06-30 | End: 2017-08-10

## 2017-06-30 RX ORDER — BISACODYL 10 MG
10 SUPPOSITORY, RECTAL RECTAL DAILY PRN
Status: DISCONTINUED | OUTPATIENT
Start: 2017-06-30 | End: 2017-07-11 | Stop reason: HOSPADM

## 2017-06-30 RX ORDER — DOCUSATE SODIUM 100 MG/1
100 CAPSULE, LIQUID FILLED ORAL 2 TIMES DAILY
Status: DISCONTINUED | OUTPATIENT
Start: 2017-06-30 | End: 2017-07-11 | Stop reason: HOSPADM

## 2017-06-30 RX ORDER — FLUCYTOSINE 500 MG/1
1500 CAPSULE ORAL EVERY 6 HOURS
Status: DISCONTINUED | OUTPATIENT
Start: 2017-06-30 | End: 2017-07-04

## 2017-06-30 RX ORDER — FLUCYTOSINE 500 MG/1
1500 CAPSULE ORAL EVERY 6 HOURS SCHEDULED
Refills: 0
Start: 2017-06-30 | End: 2017-08-10

## 2017-06-30 RX ORDER — IBUPROFEN 400 MG/1
400 TABLET ORAL EVERY 4 HOURS PRN
Status: ON HOLD | COMMUNITY
End: 2017-07-11 | Stop reason: HOSPADM

## 2017-06-30 RX ORDER — IBUPROFEN 400 MG/1
400 TABLET ORAL EVERY 4 HOURS PRN
Refills: 0
Start: 2017-06-30 | End: 2017-08-10

## 2017-06-30 RX ORDER — SODIUM CHLORIDE AND POTASSIUM CHLORIDE .9; .15 G/100ML; G/100ML
20 SOLUTION INTRAVENOUS CONTINUOUS
Status: ON HOLD | COMMUNITY
End: 2017-07-11 | Stop reason: HOSPADM

## 2017-06-30 RX ORDER — LABETALOL HYDROCHLORIDE 5 MG/ML
10 INJECTION, SOLUTION INTRAVENOUS
Start: 2017-06-30 | End: 2017-08-10

## 2017-06-30 RX ORDER — ACETAMINOPHEN 325 MG/1
650 TABLET ORAL EVERY 4 HOURS PRN
Status: DISCONTINUED | OUTPATIENT
Start: 2017-06-30 | End: 2017-06-30 | Stop reason: SDUPTHER

## 2017-06-30 RX ADMIN — DEXTROSE MONOHYDRATE: 50 INJECTION, SOLUTION INTRAVENOUS at 20:29

## 2017-06-30 RX ADMIN — POTASSIUM CHLORIDE AND SODIUM CHLORIDE 100 ML/HR: 900; 150 INJECTION, SOLUTION INTRAVENOUS at 01:35

## 2017-06-30 RX ADMIN — DOCUSATE SODIUM 100 MG: 100 CAPSULE, LIQUID FILLED ORAL at 21:06

## 2017-06-30 RX ADMIN — MAGNESIUM HYDROXIDE 10 ML: 2400 SUSPENSION ORAL at 09:40

## 2017-06-30 RX ADMIN — IBUPROFEN 400 MG: 400 TABLET, FILM COATED ORAL at 05:41

## 2017-06-30 RX ADMIN — PANTOPRAZOLE SODIUM 40 MG: 40 TABLET, DELAYED RELEASE ORAL at 05:41

## 2017-06-30 RX ADMIN — LISINOPRIL 20 MG: 20 TABLET ORAL at 09:26

## 2017-06-30 RX ADMIN — MEGESTROL ACETATE 800 MG: 40 SUSPENSION ORAL at 09:25

## 2017-06-30 RX ADMIN — THIAMINE HYDROCHLORIDE 125 ML/HR: 100 INJECTION, SOLUTION INTRAMUSCULAR; INTRAVENOUS at 09:25

## 2017-06-30 RX ADMIN — DIPHENHYDRAMINE HYDROCHLORIDE 12.5 MG: 50 INJECTION, SOLUTION INTRAMUSCULAR; INTRAVENOUS at 20:12

## 2017-06-30 RX ADMIN — Medication 500000 UNITS: at 21:06

## 2017-06-30 RX ADMIN — ATORVASTATIN CALCIUM 40 MG: 40 TABLET, FILM COATED ORAL at 21:07

## 2017-06-30 RX ADMIN — NYSTATIN 500000 UNITS: 100000 SUSPENSION ORAL at 09:25

## 2017-06-30 RX ADMIN — FLUCYTOSINE 1500 MG: 500 CAPSULE ORAL at 05:41

## 2017-06-30 RX ADMIN — SODIUM CHLORIDE 500 ML: 9 INJECTION, SOLUTION INTRAVENOUS at 19:31

## 2017-06-30 RX ADMIN — AMPHOTERICIN B 240 MG: 50 INJECTION, POWDER, LYOPHILIZED, FOR SOLUTION INTRAVENOUS at 20:55

## 2017-06-30 RX ADMIN — NYSTATIN 500000 UNITS: 100000 SUSPENSION ORAL at 11:57

## 2017-06-30 RX ADMIN — FLUCYTOSINE 1500 MG: 500 CAPSULE ORAL at 21:07

## 2017-06-30 RX ADMIN — FLUCYTOSINE 1500 MG: 500 CAPSULE ORAL at 11:57

## 2017-06-30 RX ADMIN — ASPIRIN 81 MG: 81 TABLET ORAL at 09:27

## 2017-06-30 RX ADMIN — POTASSIUM CHLORIDE 40 MEQ: 750 CAPSULE, EXTENDED RELEASE ORAL at 09:26

## 2017-06-30 RX ADMIN — BISACODYL 5 MG: 5 TABLET, COATED ORAL at 09:40

## 2017-07-01 PROBLEM — B45.1 CRYPTOCOCCAL MENINGOENCEPHALITIS (HCC): Status: ACTIVE | Noted: 2017-07-01

## 2017-07-01 PROBLEM — I63.9 ACUTE ISCHEMIC STROKE (HCC): Status: ACTIVE | Noted: 2017-07-01

## 2017-07-01 LAB
ANION GAP SERPL CALCULATED.3IONS-SCNC: 16 MMOL/L (ref 7–19)
BASOPHILS ABSOLUTE: 0 K/UL (ref 0–0.2)
BASOPHILS RELATIVE PERCENT: 0.3 % (ref 0–1)
BUN BLDV-MCNC: 15 MG/DL (ref 8–23)
CALCIUM SERPL-MCNC: 8.1 MG/DL (ref 8.8–10.2)
CHLORIDE BLD-SCNC: 99 MMOL/L (ref 98–111)
CO2: 20 MMOL/L (ref 22–29)
CREAT SERPL-MCNC: 1.2 MG/DL (ref 0.5–1.2)
EOSINOPHILS ABSOLUTE: 0.1 K/UL (ref 0–0.6)
EOSINOPHILS RELATIVE PERCENT: 0.6 % (ref 0–5)
GFR NON-AFRICAN AMERICAN: 60
GLUCOSE BLD-MCNC: 109 MG/DL (ref 74–109)
HCT VFR BLD CALC: 36 % (ref 42–52)
HEMOGLOBIN: 12.7 G/DL (ref 14–18)
LYMPHOCYTES ABSOLUTE: 1.2 K/UL (ref 1.1–4.5)
LYMPHOCYTES RELATIVE PERCENT: 14.7 % (ref 20–40)
MCH RBC QN AUTO: 33 PG (ref 27–31)
MCHC RBC AUTO-ENTMCNC: 35.3 G/DL (ref 33–37)
MCV RBC AUTO: 93.5 FL (ref 80–94)
MONOCYTES ABSOLUTE: 0.8 K/UL (ref 0–0.9)
MONOCYTES RELATIVE PERCENT: 10.5 % (ref 0–10)
NEUTROPHILS ABSOLUTE: 5.8 K/UL (ref 1.5–7.5)
NEUTROPHILS RELATIVE PERCENT: 73.4 % (ref 50–65)
PDW BLD-RTO: 12.2 % (ref 11.5–14.5)
PLATELET # BLD: 200 K/UL (ref 130–400)
PMV BLD AUTO: 9.5 FL (ref 9.4–12.4)
POTASSIUM SERPL-SCNC: 4.1 MMOL/L (ref 3.5–5)
PREALBUMIN: 22 MG/DL (ref 20–40)
RBC # BLD: 3.85 M/UL (ref 4.7–6.1)
SODIUM BLD-SCNC: 135 MMOL/L (ref 136–145)
WBC # BLD: 7.8 K/UL (ref 4.8–10.8)

## 2017-07-01 PROCEDURE — G9168 MEMORY CURRENT STATUS: HCPCS

## 2017-07-01 PROCEDURE — 2580000003 HC RX 258: Performed by: PSYCHIATRY & NEUROLOGY

## 2017-07-01 PROCEDURE — G9169 MEMORY GOAL STATUS: HCPCS

## 2017-07-01 PROCEDURE — 92522 EVALUATE SPEECH PRODUCTION: CPT

## 2017-07-01 PROCEDURE — 1180000000 HC REHAB R&B

## 2017-07-01 PROCEDURE — 97535 SELF CARE MNGMENT TRAINING: CPT

## 2017-07-01 PROCEDURE — 80048 BASIC METABOLIC PNL TOTAL CA: CPT

## 2017-07-01 PROCEDURE — 92610 EVALUATE SWALLOWING FUNCTION: CPT

## 2017-07-01 PROCEDURE — 6370000000 HC RX 637 (ALT 250 FOR IP): Performed by: PSYCHIATRY & NEUROLOGY

## 2017-07-01 PROCEDURE — 94664 DEMO&/EVAL PT USE INHALER: CPT

## 2017-07-01 PROCEDURE — 85025 COMPLETE CBC W/AUTO DIFF WBC: CPT

## 2017-07-01 PROCEDURE — 2580000003 HC RX 258: Performed by: INTERNAL MEDICINE

## 2017-07-01 PROCEDURE — 6360000002 HC RX W HCPCS: Performed by: PSYCHIATRY & NEUROLOGY

## 2017-07-01 PROCEDURE — 84134 ASSAY OF PREALBUMIN: CPT

## 2017-07-01 PROCEDURE — 97162 PT EVAL MOD COMPLEX 30 MIN: CPT

## 2017-07-01 PROCEDURE — 97166 OT EVAL MOD COMPLEX 45 MIN: CPT

## 2017-07-01 PROCEDURE — 97116 GAIT TRAINING THERAPY: CPT

## 2017-07-01 PROCEDURE — 99223 1ST HOSP IP/OBS HIGH 75: CPT | Performed by: PSYCHIATRY & NEUROLOGY

## 2017-07-01 PROCEDURE — 97110 THERAPEUTIC EXERCISES: CPT

## 2017-07-01 RX ORDER — PREGABALIN 50 MG/1
50 CAPSULE ORAL EVERY EVENING
Status: DISCONTINUED | OUTPATIENT
Start: 2017-07-01 | End: 2017-07-03

## 2017-07-01 RX ORDER — SODIUM CHLORIDE 9 MG/ML
INJECTION, SOLUTION INTRAVENOUS CONTINUOUS
Status: DISCONTINUED | OUTPATIENT
Start: 2017-07-01 | End: 2017-07-11 | Stop reason: HOSPADM

## 2017-07-01 RX ADMIN — SODIUM CHLORIDE 500 ML: 9 INJECTION, SOLUTION INTRAVENOUS at 01:54

## 2017-07-01 RX ADMIN — PREGABALIN 50 MG: 50 CAPSULE ORAL at 17:28

## 2017-07-01 RX ADMIN — IBUPROFEN 400 MG: 400 TABLET ORAL at 09:20

## 2017-07-01 RX ADMIN — FLUCYTOSINE 1500 MG: 500 CAPSULE ORAL at 12:24

## 2017-07-01 RX ADMIN — DOCUSATE SODIUM 100 MG: 100 CAPSULE, LIQUID FILLED ORAL at 09:20

## 2017-07-01 RX ADMIN — POLYETHYLENE GLYCOL 3350 17 G: 17 POWDER, FOR SOLUTION ORAL at 09:20

## 2017-07-01 RX ADMIN — PANTOPRAZOLE SODIUM 40 MG: 40 TABLET, DELAYED RELEASE ORAL at 09:20

## 2017-07-01 RX ADMIN — DEXTROSE MONOHYDRATE: 50 INJECTION, SOLUTION INTRAVENOUS at 19:00

## 2017-07-01 RX ADMIN — SODIUM CHLORIDE: 9 INJECTION, SOLUTION INTRAVENOUS at 10:52

## 2017-07-01 RX ADMIN — Medication 500000 UNITS: at 17:28

## 2017-07-01 RX ADMIN — FLUCYTOSINE 1500 MG: 500 CAPSULE ORAL at 05:47

## 2017-07-01 RX ADMIN — DEXTROSE MONOHYDRATE: 50 INJECTION, SOLUTION INTRAVENOUS at 01:15

## 2017-07-01 RX ADMIN — ASPIRIN 81 MG CHEWABLE TABLET 81 MG: 81 TABLET CHEWABLE at 09:20

## 2017-07-01 RX ADMIN — AMPHOTERICIN B 240 MG: 50 INJECTION, POWDER, LYOPHILIZED, FOR SOLUTION INTRAVENOUS at 21:54

## 2017-07-01 RX ADMIN — SODIUM CHLORIDE: 9 INJECTION, SOLUTION INTRAVENOUS at 19:30

## 2017-07-01 RX ADMIN — FLUCYTOSINE 1500 MG: 500 CAPSULE ORAL at 01:56

## 2017-07-01 RX ADMIN — FLUCYTOSINE 1500 MG: 500 CAPSULE ORAL at 17:27

## 2017-07-01 RX ADMIN — DIPHENHYDRAMINE HYDROCHLORIDE 12.5 MG: 50 INJECTION, SOLUTION INTRAMUSCULAR; INTRAVENOUS at 20:52

## 2017-07-01 RX ADMIN — DOCUSATE SODIUM 100 MG: 100 CAPSULE, LIQUID FILLED ORAL at 20:52

## 2017-07-01 RX ADMIN — ATORVASTATIN CALCIUM 40 MG: 40 TABLET, FILM COATED ORAL at 20:51

## 2017-07-01 RX ADMIN — Medication 800 MG: at 09:20

## 2017-07-01 RX ADMIN — Medication 500000 UNITS: at 10:52

## 2017-07-01 RX ADMIN — SODIUM CHLORIDE AND POTASSIUM CHLORIDE 20 ML: 9; 1.49 INJECTION, SOLUTION INTRAVENOUS at 02:35

## 2017-07-01 RX ADMIN — LISINOPRIL 20 MG: 20 TABLET ORAL at 09:20

## 2017-07-01 RX ADMIN — SODIUM CHLORIDE 500 ML: 9 INJECTION, SOLUTION INTRAVENOUS at 19:31

## 2017-07-01 RX ADMIN — Medication 500000 UNITS: at 12:24

## 2017-07-01 RX ADMIN — Medication 500000 UNITS: at 20:57

## 2017-07-01 ASSESSMENT — PAIN DESCRIPTION - FREQUENCY: FREQUENCY: CONTINUOUS

## 2017-07-01 ASSESSMENT — PAIN DESCRIPTION - LOCATION: LOCATION: NECK

## 2017-07-01 ASSESSMENT — PAIN SCALES - GENERAL
PAINLEVEL_OUTOF10: 0
PAINLEVEL_OUTOF10: 8
PAINLEVEL_OUTOF10: 10

## 2017-07-01 ASSESSMENT — PAIN DESCRIPTION - DESCRIPTORS: DESCRIPTORS: ACHING;HEADACHE

## 2017-07-01 ASSESSMENT — PAIN DESCRIPTION - ORIENTATION: ORIENTATION: RIGHT

## 2017-07-01 ASSESSMENT — PAIN DESCRIPTION - PAIN TYPE: TYPE: ACUTE PAIN

## 2017-07-01 NOTE — PLAN OF CARE
Problem: Inpatient Physical Therapy  Goal: Transfer Training Goal 1 LTG- PT  Outcome: Unable to achieve outcome(s) by discharge Date Met:  07/01/17 07/01/17 0719   Transfer Training PT LTG   Transfer Training PT LTG, Date Goal Reviewed 07/01/17   Transfer Training PT LTG, Outcome goal not met   Transfer Training PT LTG, Reason Goal Not Met discharged from facility       Goal: Gait Training Goal LTG- PT  Outcome: Unable to achieve outcome(s) by discharge Date Met:  07/01/17 07/01/17 0719   Gait Training PT LTG   Gait Training Goal PT LTG, Date Goal Reviewed 07/01/17   Gait Training Goal PT LTG, Outcome goal not met   Gait Training Goal PT LTG, Reason Goal Not Met discharged from facility       Goal: Stair Training Goal LTG- PT  Outcome: Unable to achieve outcome(s) by discharge Date Met:  07/01/17 07/01/17 0719   Stair Training PT LTG   Stair Training Goal PT LTG, Date Goal Reviewed 07/01/17   Stair Training Goal PT LTG, Outcome goal not met   Stair Training Goal PT LTG, Reason Goal Not Met discharged from facility       Goal: Dynamic Standing Balance Goal LTG- PT  Outcome: Unable to achieve outcome(s) by discharge Date Met:  07/01/17 07/01/17 0719   Dynamic Standing Balance PT LTG   Dynamic Standing Balance PT LTG, Date Goal Reviewed 07/01/17   Dynamic Standing Balance PT LTG, Outcome goal not met   Dynamic Standing Balance PT LTG, Reason Goal Not Met discharged from facility

## 2017-07-01 NOTE — THERAPY DISCHARGE NOTE
Acute Care - Physical Therapy Discharge Summary  [facility]       Patient Name: Harjit Mccoy  : 1945  MRN: [id number]    Today's Date: 2017  Onset of Illness/Injury or Date of Surgery Date: 17    Date of Referral to PT: 17  Referring Physician: Yuriy Shah MD      Admit Date: 2017      PT Recommendation and Plan    Visit Dx:    ICD-10-CM ICD-9-CM   1. Oropharyngeal dysphagia R13.12 787.22   2. Impaired mobility and ADLs Z74.09 799.89   3. Impaired cognition R41.89 294.9   4. Impaired mobility Z74.09 799.89             Outcome Measures       17 1100 17 1000 17 1400    How much help from another person do you currently need...    Turning from your back to your side while in flat bed without using bedrails? 4  -NW  4  -MS (r) RZ (t) MS (c)    Moving from lying on back to sitting on the side of a flat bed without bedrails? 3  -NW  4  -MS (r) RZ (t) MS (c)    Moving to and from a bed to a chair (including a wheelchair)? 3  -NW  3  -MS (r) RZ (t) MS (c)    Standing up from a chair using your arms (e.g., wheelchair, bedside chair)? 3  -NW  3  -MS (r) RZ (t) MS (c)    Climbing 3-5 steps with a railing? 3  -NW  3  -MS (r) RZ (t) MS (c)    To walk in hospital room? 3  -NW  3  -MS (r) RZ (t) MS (c)    AM-PAC 6 Clicks Score 19  -NW  20  -MS (r) RZ (t)    How much help from another is currently needed...    Putting on and taking off regular lower body clothing?  3  -ND     Bathing (including washing, rinsing, and drying)  3  -ND     Toileting (which includes using toilet bed pan or urinal)  3  -ND     Putting on and taking off regular upper body clothing  3  -ND     Taking care of personal grooming (such as brushing teeth)  3  -ND     Eating meals  4  -ND     Score  19  -ND     Functional Assessment    Outcome Measure Options AM-PAC 6 Clicks Basic Mobility (PT)  -NW  AM-PAC 6 Clicks Basic Mobility (PT)  -MS (r) RZ (t) MS (c)      17 0900          How much help from  another is currently needed...    Putting on and taking off regular lower body clothing? 3  -ND      Bathing (including washing, rinsing, and drying) 3  -ND      Toileting (which includes using toilet bed pan or urinal) 3  -ND      Putting on and taking off regular upper body clothing 3  -ND      Taking care of personal grooming (such as brushing teeth) 3  -ND      Eating meals 3  -ND      Score 18  -ND        User Key  (r) = Recorded By, (t) = Taken By, (c) = Cosigned By    Initials Name Provider Type    MS Na Louis, PT DPT Physical Therapist    NW Nithya Mccoy, PTA Physical Therapy Assistant    ND Aziza Delgado, OTR/L Occupational Therapist    RSIDDHARTHA Aceves, PT Student PT Student                      IP PT Goals       07/01/17 0719 06/29/17 1457 06/26/17 0933    Transfer Training PT LTG    Transfer Training PT LTG, Date Established   06/26/17  -PB (r) RZ (t) PB (c)    Transfer Training PT LTG, Time to Achieve   by discharge  -PB (r) RZ (t) PB (c)    Transfer Training PT LTG, Activity Type   all transfers  -PB (r) RZ (t) PB (c)    Transfer Training PT LTG, [place] Level   conditional independence  -PB (r) RZ (t) PB (c)    Transfer Training PT LTG, Assist Device   walker, rolling  -PB (r) RZ (t) PB (c)    Transfer Training PT  LTG, Date Goal Reviewed 07/01/17  -CW      Transfer Training PT LTG, Outcome goal not met  -CW goal ongoing  -MS (r) RZ (t) MS (c)     Transfer Training PT LTG, Reason Goal Not Met discharged from facility  -CW progress slower than expected  -MS (r) RZ (t) MS (c)     Gait Training PT LTG    Gait Training Goal PT LTG, Date Established   06/26/17  -PB (r) RZ (t) PB (c)    Gait Training Goal PT LTG, Time to Achieve   by discharge  -PB (r) RZ (t) PB (c)    Gait Training Goal PT LTG, [place] Level   conditional independence  -PB (r) RZ (t) PB (c)    Gait Training Goal PT LTG, Assist Device   walker, rolling  -PB (r) RZ (t) PB (c)    Gait Training Goal PT LTG, Distance  to Achieve  200 ft   -MS (r) RZ (t) MS (c) 150 ft  -PB (r) RZ (t) PB (c)    Gait Training Goal PT LTG, Date Goal Reviewed 07/01/17  -CW      Gait Training Goal PT LTG, Outcome goal not met  -CW goal revised   Pt is able to ambulate > 150 but very impulsive requires CGA  -MS (r) RZ (t) MS (c)     Gait Training Goal PT LTG, Reason Goal Not Met discharged from facility  -CW      Stair Training PT LTG    Stair Training Goal PT LTG, Date Established   06/26/17  -PB (r) RZ (t) PB (c)    Stair Training Goal PT LTG, Time to Achieve   by discharge  -PB (r) RZ (t) PB (c)    Stair Training Goal PT LTG, Number of Steps   2  -PB (r) RZ (t) PB (c)    Stair Training Goal PT LTG, [place] Level   contact guard assist  -PB (r) RZ (t) PB (c)    Stair Training Goal PT LTG, Date Goal Reviewed 07/01/17  -CW      Stair Training Goal PT LTG, Outcome goal not met  -CW goal ongoing  -MS (r) RZ (t) MS (c)     Stair Training Goal PT LTG, Reason Goal Not Met discharged from facility  -CW progress slower than expected  -MS (r) RZ (t) MS (c)     Dynamic Standing Balance PT LTG    Dynamic Standing Balance PT LTG, Date Established   06/26/17  -PB (r) RZ (t) PB (c)    Dynamic Standing Balance PT LTG, Time to Achieve   by discharge  -PB (r) RZ (t) PB (c)    Dynamic Standing Balance PT LTG, [place] Level   conditional independence  -PB (r) RZ (t) PB (c)    Dynamic Standing Balance PT LTG, Assist Device   assistive Device  -PB (r) RZ (t) PB (c)    Dynamic Standing Balance PT LTG, Additional Goal   Pt to achieve forward and backward gait with ability to self correct % of the time  -PB (r) RZ (t) PB (c)    Dynamic Standing Balance PT LTG, Date Goal Reviewed 07/01/17  -CW      Dynamic Standing Balance PT LTG, Outcome goal not met  -CW goal ongoing  -MS (r) RZ (t) MS (c)     Dynamic Standing Balance PT LTG, Reason Goal Not Met discharged from facility  -CW        User Key  (r) = Recorded By, (t) = Taken By, (c) = Cosigned By    Initials  Name Provider Type    MS Na Louis, PT DPT Physical Therapist    CW Sandra Sanabria, PTA Physical Therapy Assistant    KAIDEN Whyte, PT DPT Physical Therapist    SAIDA Aceves, PT Student PT Student              PT Discharge Summary  Reason for Discharge: Discharge from facility  Outcomes Achieved: Refer to plan of care for updates on goals achieved  Discharge Destination: Inpatient rehabilitation facility      Sandra Sanabria PTA   7/1/2017

## 2017-07-02 LAB
ANION GAP SERPL CALCULATED.3IONS-SCNC: 14 MMOL/L (ref 7–19)
BACTERIA SPEC AEROBE CULT: ABNORMAL
BACTERIA SPEC AEROBE CULT: ABNORMAL
BUN BLDV-MCNC: 14 MG/DL (ref 8–23)
CALCIUM SERPL-MCNC: 8.5 MG/DL (ref 8.8–10.2)
CHLORIDE BLD-SCNC: 98 MMOL/L (ref 98–111)
CO2: 21 MMOL/L (ref 22–29)
CREAT SERPL-MCNC: 1.3 MG/DL (ref 0.5–1.2)
GFR NON-AFRICAN AMERICAN: 55
GLUCOSE BLD-MCNC: 120 MG/DL (ref 74–109)
GRAM STN SPEC: ABNORMAL
ISOLATED FROM: ABNORMAL
MAGNESIUM: 1.8 MG/DL (ref 1.6–2.4)
POTASSIUM SERPL-SCNC: 3.9 MMOL/L (ref 3.5–5)
SODIUM BLD-SCNC: 133 MMOL/L (ref 136–145)

## 2017-07-02 PROCEDURE — 1180000000 HC REHAB R&B

## 2017-07-02 PROCEDURE — 6360000002 HC RX W HCPCS: Performed by: PSYCHIATRY & NEUROLOGY

## 2017-07-02 PROCEDURE — 6370000000 HC RX 637 (ALT 250 FOR IP): Performed by: PSYCHIATRY & NEUROLOGY

## 2017-07-02 PROCEDURE — 2580000003 HC RX 258: Performed by: INTERNAL MEDICINE

## 2017-07-02 PROCEDURE — 2580000003 HC RX 258: Performed by: PSYCHIATRY & NEUROLOGY

## 2017-07-02 PROCEDURE — 83735 ASSAY OF MAGNESIUM: CPT

## 2017-07-02 PROCEDURE — 80048 BASIC METABOLIC PNL TOTAL CA: CPT

## 2017-07-02 RX ADMIN — FLUCYTOSINE 1500 MG: 500 CAPSULE ORAL at 12:08

## 2017-07-02 RX ADMIN — Medication 500000 UNITS: at 19:52

## 2017-07-02 RX ADMIN — LISINOPRIL 20 MG: 20 TABLET ORAL at 08:40

## 2017-07-02 RX ADMIN — DOCUSATE SODIUM 100 MG: 100 CAPSULE, LIQUID FILLED ORAL at 08:40

## 2017-07-02 RX ADMIN — Medication 800 MG: at 08:40

## 2017-07-02 RX ADMIN — ISOMETHEPTENE MUCATE, DICHLORALPHENAZONE, AND ACETAMINOPHEN 1 CAPSULE: 65; 100; 325 CAPSULE ORAL at 06:12

## 2017-07-02 RX ADMIN — ATORVASTATIN CALCIUM 40 MG: 40 TABLET, FILM COATED ORAL at 19:52

## 2017-07-02 RX ADMIN — PREGABALIN 50 MG: 50 CAPSULE ORAL at 17:25

## 2017-07-02 RX ADMIN — PANTOPRAZOLE SODIUM 40 MG: 40 TABLET, DELAYED RELEASE ORAL at 08:41

## 2017-07-02 RX ADMIN — FLUCYTOSINE 1500 MG: 500 CAPSULE ORAL at 06:09

## 2017-07-02 RX ADMIN — DIPHENHYDRAMINE HYDROCHLORIDE 12.5 MG: 50 INJECTION, SOLUTION INTRAMUSCULAR; INTRAVENOUS at 21:03

## 2017-07-02 RX ADMIN — POLYETHYLENE GLYCOL 3350 17 G: 17 POWDER, FOR SOLUTION ORAL at 08:40

## 2017-07-02 RX ADMIN — ASPIRIN 81 MG CHEWABLE TABLET 81 MG: 81 TABLET CHEWABLE at 08:40

## 2017-07-02 RX ADMIN — SODIUM CHLORIDE: 9 INJECTION, SOLUTION INTRAVENOUS at 10:56

## 2017-07-02 RX ADMIN — FLUCYTOSINE 1500 MG: 500 CAPSULE ORAL at 17:25

## 2017-07-02 RX ADMIN — SODIUM CHLORIDE 500 ML: 9 INJECTION, SOLUTION INTRAVENOUS at 08:34

## 2017-07-02 RX ADMIN — AMPHOTERICIN B 240 MG: 50 INJECTION, POWDER, LYOPHILIZED, FOR SOLUTION INTRAVENOUS at 21:38

## 2017-07-02 RX ADMIN — DEXTROSE MONOHYDRATE: 50 INJECTION, SOLUTION INTRAVENOUS at 21:00

## 2017-07-02 RX ADMIN — DEXTROSE MONOHYDRATE: 50 INJECTION, SOLUTION INTRAVENOUS at 08:49

## 2017-07-02 RX ADMIN — FLUCYTOSINE 1500 MG: 500 CAPSULE ORAL at 01:46

## 2017-07-02 RX ADMIN — SODIUM CHLORIDE 500 ML: 9 INJECTION, SOLUTION INTRAVENOUS at 19:52

## 2017-07-02 RX ADMIN — DOCUSATE SODIUM 100 MG: 100 CAPSULE, LIQUID FILLED ORAL at 19:52

## 2017-07-02 ASSESSMENT — PAIN DESCRIPTION - DESCRIPTORS: DESCRIPTORS: ACHING;HEADACHE

## 2017-07-02 ASSESSMENT — PAIN SCALES - GENERAL
PAINLEVEL_OUTOF10: 6
PAINLEVEL_OUTOF10: 0
PAINLEVEL_OUTOF10: 0

## 2017-07-02 ASSESSMENT — PAIN DESCRIPTION - PAIN TYPE: TYPE: ACUTE PAIN

## 2017-07-02 ASSESSMENT — PAIN DESCRIPTION - FREQUENCY: FREQUENCY: CONTINUOUS

## 2017-07-02 ASSESSMENT — PAIN DESCRIPTION - LOCATION: LOCATION: NECK

## 2017-07-02 ASSESSMENT — PAIN DESCRIPTION - ORIENTATION: ORIENTATION: RIGHT

## 2017-07-02 NOTE — THERAPY DISCHARGE NOTE
Acute Care - Occupational Therapy Initial Eval/Discharge  [facility]     Patient Name: Harjit Mccoy  : 1945  MRN: [id number]  Today's Date: 2017  Onset of Illness/Injury or Date of Surgery Date: 17  Date of Referral to OT: 17  Referring Physician: Yuriy Shah MD      Admit Date: 2017       ICD-10-CM ICD-9-CM   1. Oropharyngeal dysphagia R13.12 787.22   2. Impaired mobility and ADLs Z74.09 799.89   3. Impaired cognition R41.89 294.9   4. 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     Past Medical History:   Diagnosis Date   • CVA (cerebral vascular accident) 2017   • Hypertension      Past Surgical History:   Procedure Laterality Date   • CHOLECYSTECTOMY     • TONSILLECTOMY            OT ASSESSMENT FLOWSHEET (last 72 hours)      OT Evaluation       17 0725 17 1600 17 1123 17 1021 17 1441    Rehab Evaluation    Document Type  discharge summary  -MB therapy note (daily note)  -NW therapy note (daily note)  -ND therapy note (daily note)  -MB    Subjective Information   agree to therapy;complains of;weakness  -NW complains of;agree to therapy;weakness;fatigue  -ND no complaints;agree to therapy  -MB    Patient Effort, Rehab Treatment   good  -NW  good  -MB    General Information    Precautions/Limitations   fall precautions  -NW fall precautions   impulsive  -ND     Clinical Impression    Anticipated Discharge Disposition inpatient rehabilitation facility  -ND        Pain Assessment    Pain Assessment   No/denies pain  -NW No/denies pain  -ND No/denies pain  -MB    Cognitive Assessment/Intervention    Personal Safety    decreased awareness, need for assist;decreased awareness, need for safety;decreased insight to deficits  -ND      Personal Safety Interventions   fall prevention program maintained  -NW fall prevention program maintained;gait belt;nonskid shoes/slippers when out of bed;supervised activity  -ND     Bed Mobility, Assessment/Treatment    Bed Mobility, Assistive Device    bed rails;head of bed elevated  -ND     Bed Mobility, Scoot/Bridge, Tanner    conditional independence  -ND     Bed Mob, Supine to Sit, Tanner   supervision required  -NW conditional independence  -ND     Bed Mob, Sit to Supine, Tanner   conditional independence  -NW conditional independence  -ND     Bed Mobility, Impairments   impaired balance  -NW      Transfer Assessment/Treatment    Transfers, Sit-Stand Tanner   verbal cues required;contact guard assist;minimum assist (75% patient effort)  -NW contact guard assist;verbal cues required;nonverbal cues required (demo/gesture)  -ND     Transfers, Stand-Sit Tanner   verbal cues required;contact guard assist  -NW verbal cues required;nonverbal cues required (demo/gesture);contact guard assist  -ND     Transfer, Safety Issues   balance decreased during turns;step length decreased;weight-shifting ability decreased;loses balance backward;impulsivity  -NW      Transfer, Impairments   strength decreased;impaired balance;coordination impaired  -NW impaired balance;strength decreased;postural control impaired  -ND     Transfer, Comment   Needs verbal cues for safety  -NW      Functional Mobility    Functional Mobility- Ind. Level    verbal cues required;nonverbal cues required (demo/gesture);contact guard assist  -ND     Functional Mobility- Device    rolling walker  -ND     Functional Mobility-Distance (Feet)    --   in room in bathroom  -ND     Functional Mobility- Safety Issues    step length decreased  -ND     Functional Mobility- Comment    Pt. had better safety awareness this date however still impulsive at times  -ND     Grooming Assessment/Training    Grooming Assess/Train, Assistive  Device    --   shaving, brush teeth, wash face  -ND     Grooming Assess/Train, Position    sitting  -ND     Grooming Assess/Train, Indepen Level    verbal cues required;nonverbal cues required (demo/gesture);supervision required  -ND     Grooming Assess/Train, Impairments    strength decreased;impaired balance  -ND     Balance Skills Training    Standing-Level of Assistance   Minimum assistance  -NW      Static Standing Balance Support   No upper extremity supported  -NW      Gait Balance-Level of Assistance   Minimum assistance;Moderate assistance  -NW      Gait Balance Support   No upper extremity supported  -NW      Gait Balance Activities   scanning environment R/L  -NW      Therapy Exercises    Bilateral Lower Extremities   AROM:;15 reps;sitting;standing;10 reps;heel raises;hip abduction/adduction;hip flexion;LAQ  -NW      Positioning and Restraints    Pre-Treatment Position   in bed  -NW in bed  -ND     Post Treatment Position   bed  -NW bed  -ND     In Bed   fowlers;call light within reach;encouraged to call for assist;with family/caregiver  -NW fowlers;call light within reach;encouraged to call for assist;with family/caregiver;side rails up x2  -ND       06/29/17 1400 06/29/17 0805             Rehab Evaluation    Document Type re-evaluation  -MS (r) RZ (t) MS (c) therapy note (daily note)  -ND       Subjective Information agree to therapy;complains of;weakness;fatigue  -MS (r) RZ (t) MS (c) agree to therapy;complains of;weakness;fatigue  -ND       Patient Effort, Rehab Treatment good  -MS (r) RZ (t) MS (c)        Symptoms Noted During/After Treatment none  -MS (r) RZ (t) MS (c)        General Information    Patient Profile Review yes  -MS (r) RZ (t) MS (c)        Onset of Illness/Injury or Date of Surgery Date 06/24/17  -MS (r) RZ (t) MS (c)        Referring Physician Yuriy Shah MD  -MS (r) RZ (t) MS (c)        General Observations Pt in fowlers, telemetry intect, spouse and daughter present   -MS (r) RZ  (t) MS (c)        Precautions/Limitations fall precautions  -MS (r) RZ (t) MS (c) fall precautions   IMPULSIVE MUST USE GAIT BELT!  -ND       Risks Reviewed patient and family:;dizziness;nausea/vomiting;increased discomfort;change in vital signs  -MS (r) RZ (t) MS (c)        Benefits Reviewed patient and family:;improve function;increase independence;increase strength;increase balance;decrease pain  -MS (r) RZ (t) MS (c)        Barriers to Rehab medically complex  -MS (r) RZ (t) MS (c)        Pain Assessment    Pain Assessment No/denies pain  -MS (r) RZ (t) MS (c) No/denies pain  -ND       Vision Assessment/Intervention    Visual Impairment WFL  -MS (r) RZ (t) MS (c)        Cognitive Assessment/Intervention    Current Cognitive/Communication Assessment functional  -MS (r) RZ (t) MS (c) functional  -ND       Orientation Status oriented to;person;time;place  -MS (r) RZ (t) MS (c)        Personal Safety moderate impairment;impulsive;decreased awareness, need for assist;decreased awareness, need for safety;decreased insight to deficits  -MS (r) RZ (t) MS (c) moderate impairment;decreased awareness, need for assist;decreased awareness, need for safety;decreased insight to deficits;impulsive  -ND       Personal Safety Interventions fall prevention program maintained;gait belt;nonskid shoes/slippers when out of bed;muscle strengthening facilitated;supervised activity  -MS (r) RZ (t) MS (c) fall prevention program maintained;gait belt;nonskid shoes/slippers when out of bed;supervised activity  -ND       ROM (Range of Motion)    General ROM no range of motion deficits identified  -MS (r) RZ (t) MS (c)        MMT (Manual Muscle Testing)    General MMT Assessment Detail Grossly 4+/5  -MS (r) RZ (t) MS (c)        Bed Mobility, Assessment/Treatment    Bed Mob, Supine to Sit, Yoder supervision required  -MS (r) RZ (t) MS (c) supervision required  -ND       Bed Mob, Sit to Supine, Yoder  --   chair  -ND       Bed  Mobility, Safety Issues cognitive deficits limit understanding  -MS (r) RZ (t) MS (c) cognitive deficits limit understanding  -ND       Bed Mobility, Impairments impaired balance;strength decreased  -MS (r) RZ (t) MS (c) impaired balance;strength decreased  -ND       Transfer Assessment/Treatment    Transfers, Sit-Stand Scurry verbal cues required;nonverbal cues required (demo/gesture);contact guard assist  -MS (r) RZ (t) MS (c) verbal cues required;nonverbal cues required (demo/gesture);contact guard assist;minimum assist (75% patient effort)  -ND       Transfers, Stand-Sit Scurry verbal cues required;nonverbal cues required (demo/gesture);contact guard assist  -MS (r) RZ (t) MS (c) verbal cues required;nonverbal cues required (demo/gesture);contact guard assist;minimum assist (75% patient effort)  -ND       Transfers, Sit-Stand-Sit, Assist Device rolling walker  -MS (r) RZ (t) MS (c) rolling walker  -ND       Walk-In Shower Transfer, Scurry  verbal cues required;nonverbal cues required (demo/gesture);contact guard assist  -ND       Walk-In Shower Transfer, Assist Device  rolling walker  -ND       Transfer, Safety Issues step length decreased;impulsivity  -MS (r) RZ (t) MS (c) step length decreased  -ND       Transfer, Impairments strength decreased;impaired balance  -MS (r) RZ (t) MS (c) strength decreased;impaired balance;postural control impaired   impulsive  -ND       Transfer, Comment Pt is impulsive and does not follow repeated verbal cues for safety. Pt not receptive to safety education.  -MS (r) RZ (t) MS (c) Pt. is VERY impulsive and will roll walker into anything in sight and push too far in fron of him even after education multiple attempts.   -ND       Functional Mobility    Functional Mobility- Ind. Level  verbal cues required;nonverbal cues required (demo/gesture);contact guard assist;minimum assist (75% patient effort)  -ND       Functional Mobility- Device  rolling walker  -ND        Functional Mobility-Distance (Feet)  --   in room in shower  -ND       Functional Mobility- Safety Issues  step length decreased  -ND       Functional Mobility- Comment  Pt. very unaware of surroundings at times and has to be constantly cued on safety  -ND       ADL Assessment/Intervention    Additional Documentation  Self-Feeding Assessment/Training (Group)  -ND       Upper Body Bathing Assessment/Training    UB Bathing Assess/Train Assistive Device  hand-held shower head;shower chair with back  -ND       UB Bathing Assess/Train, Position  sitting  -ND       UB Bathing Assess/Train, Paul Level  supervision required  -ND       UB Bathing Assess/Train, Impairments  strength decreased;impaired balance  -ND       Lower Body Bathing Assessment/Training    LB Bathing Assess/Train Assistive Device  hand-held shower head;shower chair with back  -ND       LB Bathing Assess/Train, Position  sitting;standing  -ND       LB Bathing Assess/Train, Paul Level  verbal cues required;nonverbal cues required (demo/gesture);contact guard assist  -ND       LB Bathing Assess/Train, Impairments  strength decreased;impaired balance;postural control impaired  -ND       Upper Body Dressing Assessment/Training    UB Dressing Assess/Train, Clothing Type  doffing:;donning:;t-shirt  -ND       UB Dressing Assess/Train, Position  sitting  -ND       UB Dressing Assess/Train, Paul  contact guard assist  -ND       UB Dressing Assess/Train, Impairments  impaired balance;strength decreased  -ND       Lower Body Dressing Assessment/Training    LB Dressing Assess/Train, Clothing Type  doffing:;donning:;shorts;socks  -ND       LB Dressing Assess/Train, Position  sitting;standing  -ND       LB Dressing Assess/Train, Paul  verbal cues required;nonverbal cues required (demo/gesture);minimum assist (75% patient effort)  -ND       LB Dressing Assess/Train, Impairments  strength decreased;impaired balance;postural control  impaired  -ND       Self-Feeding Assessment/Training    Self-Feeding Assess/Train, Position  sitting  -ND       Self-Feeding Assess/Train, Alger  set up required  -ND       Self-Feeding Assess/Train, Spillage Amount  minimal  -ND       Self-Feeding Assess/Train, Impairments  strength decreased;impaired balance;coordination impaired;postural control impaired  -ND       Motor Skills/Interventions    Additional Documentation Balance Skills Training (Group)  -MS (r) RZ (t) MS (c) Balance Skills Training (Group)  -ND       Balance Skills Training    Sitting-Level of Assistance Close supervision  -MS (r) RZ (t) MS (c) Close supervision  -ND       Sitting-Balance Support Left upper extremity supported;Right upper extremity supported  -MS (r) RZ (t) MS (c) Right upper extremity supported;Left upper extremity supported;Feet supported  -ND       Standing-Level of Assistance Contact guard  -MS (r) RZ (t) MS (c) Contact guard;Minimum assistance   Min when LOB occurs  -ND       Static Standing Balance Support assistive device  -MS (r) RZ (t) MS (c) assistive device  -ND       Gait Balance-Level of Assistance Minimum assistance  -MS (r) RZ (t) MS (c) Contact guard;Minimum assistance  -ND       Gait Balance Support assistive device  -MS (r) RZ (t) MS (c) assistive device  -ND       Positioning and Restraints    Pre-Treatment Position in bed  -MS (r) RZ (t) MS (c) in bed  -ND       Post Treatment Position chair  -MS (r) RZ (t) MS (c) chair  -ND       In Chair sitting;call light within reach;encouraged to call for assist;with SLP;with family/caregiver;legs elevated  -MS (r) RZ (t) MS (c) reclined;call light within reach;encouraged to call for assist;with family/caregiver;legs elevated  -ND         User Key  (r) = Recorded By, (t) = Taken By, (c) = Cosigned By    Initials Name Effective Dates    LIS Starkey, CAROLEE-SLP 08/02/16 -     MS Na Louis, PT DPT 08/02/16 -     NW Nithya Mccoy, PTA 08/02/16 -     ND  Aziza Delgado, OTR/L 10/21/16 -     SAIDA Aceves, PT Student 05/08/17 -           Occupational Therapy Education     Title: PT OT SLP Therapies (Done)     Topic: Occupational Therapy (Resolved)     Point: ADL training (Resolved)    Description: Instruct learner(s) on proper safety adaptation and remediation techniques during self care or transfers.   Instruct in proper use of assistive devices.    Learning Progress Summary    Learner Readiness Method Response Comment Documented by Status   Patient Acceptance E VU,NR Pt. educated on safety awareness, benefit of activity, progression with poc ND 06/30/17 1055 Done    Acceptance E VU,NR Pt. educated on role of OT, safe t/f, decreasing impulsive behaviors, r/w safety, progression with poc, nutrition needs ND 06/29/17 0910 Done    Acceptance E VU,NR Pt. educated on role of OT, safe t/f, taking time, safe fx mob, progression with poc ND 06/27/17 1456 Done    Acceptance E VU,NR Pt educated on role of of OT, safe t/f, benefit of activity, and progression with poc ND 06/26/17 0928 Done    Acceptance E,D VU,NR Education provided on purpose of OT eval, impairments found, fall risk and safety concerns and d/c recommendation of inpatient rehab depending on progress. TR 06/25/17 1345 Done   Family Acceptance E,D VU,NR Education provided on purpose of OT eval, impairments found, fall risk and safety concerns and d/c recommendation of inpatient rehab depending on progress. TR 06/25/17 1345 Done               Point: Home exercise program (Resolved)    Description: Instruct learner(s) on appropriate technique for monitoring, assisting and/or progressing therapeutic exercises/activities.    Learning Progress Summary    Learner Readiness Method Response Comment Documented by Status   Patient Acceptance E VU,NR Pt. educated on safety awareness, benefit of activity, progression with poc ND 06/30/17 1055 Done    Acceptance E VU,NR Pt. educated on role of OT, safe t/f, decreasing  impulsive behaviors, r/w safety, progression with poc, nutrition needs ND 06/29/17 0910 Done    Acceptance E VU,NR Pt. educated on role of OT, safe t/f, taking time, safe fx mob, progression with poc ND 06/27/17 1456 Done    Acceptance E VU,NR Pt educated on role of of OT, safe t/f, benefit of activity, and progression with poc ND 06/26/17 0928 Done               Point: Precautions (Resolved)    Description: Instruct learner(s) on prescribed precautions during self-care and functional transfers.    Learning Progress Summary    Learner Readiness Method Response Comment Documented by Status   Patient Acceptance E VU,NR Pt. educated on safety awareness, benefit of activity, progression with poc ND 06/30/17 1055 Done    Acceptance E VU,NR Pt. educated on role of OT, safe t/f, decreasing impulsive behaviors, r/w safety, progression with poc, nutrition needs ND 06/29/17 0910 Done    Acceptance E VU,NR Pt. educated on role of OT, safe t/f, taking time, safe fx mob, progression with poc ND 06/27/17 1456 Done    Acceptance E VU,NR Pt educated on role of of OT, safe t/f, benefit of activity, and progression with poc ND 06/26/17 0928 Done    Acceptance E,D VU,NR Education provided on purpose of OT eval, impairments found, fall risk and safety concerns and d/c recommendation of inpatient rehab depending on progress. TR 06/25/17 1345 Done   Family Acceptance E,D VU,NR Education provided on purpose of OT eval, impairments found, fall risk and safety concerns and d/c recommendation of inpatient rehab depending on progress. TR 06/25/17 1345 Done               Point: Body mechanics (Resolved)    Description: Instruct learner(s) on proper positioning and spine alignment during self-care, functional mobility activities and/or exercises.    Learning Progress Summary    Learner Readiness Method Response Comment Documented by Status   Patient Acceptance E VU,NR Pt. educated on safety awareness, benefit of activity, progression with poc  ND 06/30/17 1055 Done    Acceptance E VU,NR Pt. educated on role of OT, safe t/f, decreasing impulsive behaviors, r/w safety, progression with poc, nutrition needs ND 06/29/17 0910 Done    Acceptance E VU,NR Pt. educated on role of OT, safe t/f, taking time, safe fx mob, progression with poc ND 06/27/17 1456 Done    Acceptance E VU,NR Pt educated on role of of OT, safe t/f, benefit of activity, and progression with poc ND 06/26/17 0928 Done                      User Key     Initials Effective Dates Name Provider Type Discipline    TR 06/22/15 -  Kelly Warner, OTR/L Occupational Therapist OT    ND 10/21/16 -  Aziza Delgado, OTR/L Occupational Therapist OT                OT Recommendation and Plan  Anticipated Equipment Needs At Discharge: front wheeled walker, shower chair (Depending on progress. )  Anticipated Discharge Disposition: inpatient rehabilitation facility  Planned Therapy Interventions: activity intolerance, ADL retraining, balance training, bed mobility training, energy conservation, home exercise program, neuromuscular re-education, motor coordination training, ROM (Range of Motion), strengthening, transfer training  Therapy Frequency: 3-5 times/wk  Plan of Care Review  Plan Of Care Reviewed With: patient  Progress: progress toward functional goals as expected  Outcome Summary/Follow up Plan: OT tx completed. Pt. weak and lethargic this date. Pt. cond I for bed mob. Pt. CGA with r/w to complete sit to stand t/f and fx mob with vc to maintain good safety awareness for environment. Pt. sat on shower chair to complete face shaving, brushing teeth, and washing face with supervision. Pt. cont to benefit from skilled OT d/t decreased balance, decreased strength, decreased act tolerance, decreased ind in ADLs. D/T d/c to IP rehab this date. 6/30/17 1057           OT Goals       07/02/17 0724 06/25/17 1346       Transfer Training OT LTG    Transfer Training OT LTG, Date Established  06/25/17  -SAUL      Transfer Training OT LTG, Time to Achieve  by discharge  -TR     Transfer Training OT LTG, Activity Type  bed to chair /chair to bed;walk-in shower;shower chair;sit to stand/stand to sit;toilet  -TR     Transfer Training OT LTG, Alsip Level  supervision required  -TR     Transfer Training OT LTG, Assist Device  walker, rolling  -TR     Transfer Training OT LTG, Date Goal Reviewed 07/02/17  -ND      Transfer Training OT LTG, Outcome goal not met  -ND      Transfer Training OT LTG, Reason Goal Not Met discharged from facility  -ND      Safety Awareness OT LTG    Safety Awareness OT LTG, Date Established  06/25/17  -TR     Safety Awareness OT LTG, Time to Achieve  by discharge  -TR     Safety Awareness OT LTG, Activity Type  good safety awareness;with ADL's  -TR     Safety Awareness OT LTG, Date Goal Reviewed 07/02/17  -ND      Safety Awareness OT LTG, Outcome goal not met  -ND      Safety Awareness OT LTG, Reason Goal Not Met discharged from facility  -ND      ADL OT LTG    ADL OT LTG, Date Established  06/25/17  -TR     ADL OT LTG, Time to Achieve  by discharge  -TR     ADL OT LTG, Activity Type  ADL skills  -TR     ADL OT LTG, Alsip Level  contact guard  -TR     ADL OT LTG, Additional Goal  Bathing, dressing and toileting.   -TR     ADL OT LTG, Date Goal Reviewed 07/02/17  -ND      ADL OT LTG, Outcome goal not met  -ND      ADL OT LTG, Reason Goal Not Met discharged from facility  -ND        User Key  (r) = Recorded By, (t) = Taken By, (c) = Cosigned By    Initials Name Provider Type    TR Kelly Warner, OTR/L Occupational Therapist    ND Aziza Delgado, OTR/L Occupational Therapist                Outcome Measures       06/30/17 1100 06/30/17 1000 06/29/17 1400    How much help from another person do you currently need...    Turning from your back to your side while in flat bed without using bedrails? 4  -NW  4  -MS (r) RZ (t) MS (c)    Moving from lying on back to sitting on the side of a flat  bed without bedrails? 3  -NW  4  -MS (r) RZ (t) MS (c)    Moving to and from a bed to a chair (including a wheelchair)? 3  -NW  3  -MS (r) RZ (t) MS (c)    Standing up from a chair using your arms (e.g., wheelchair, bedside chair)? 3  -NW  3  -MS (r) RZ (t) MS (c)    Climbing 3-5 steps with a railing? 3  -NW  3  -MS (r) RZ (t) MS (c)    To walk in hospital room? 3  -NW  3  -MS (r) RZ (t) MS (c)    AM-PAC 6 Clicks Score 19  -NW  20  -MS (r) RZ (t)    How much help from another is currently needed...    Putting on and taking off regular lower body clothing?  3  -ND     Bathing (including washing, rinsing, and drying)  3  -ND     Toileting (which includes using toilet bed pan or urinal)  3  -ND     Putting on and taking off regular upper body clothing  3  -ND     Taking care of personal grooming (such as brushing teeth)  3  -ND     Eating meals  4  -ND     Score  19  -ND     Functional Assessment    Outcome Measure Options AM-PAC 6 Clicks Basic Mobility (PT)  -NW  AM-PAC 6 Clicks Basic Mobility (PT)  -MS (r) RZ (t) MS (c)      06/29/17 0900          How much help from another is currently needed...    Putting on and taking off regular lower body clothing? 3  -ND      Bathing (including washing, rinsing, and drying) 3  -ND      Toileting (which includes using toilet bed pan or urinal) 3  -ND      Putting on and taking off regular upper body clothing 3  -ND      Taking care of personal grooming (such as brushing teeth) 3  -ND      Eating meals 3  -ND      Score 18  -ND        User Key  (r) = Recorded By, (t) = Taken By, (c) = Cosigned By    Initials Name Provider Type    MS Na Louis, PT DPT Physical Therapist    NW Nithya Mccoy, PTA Physical Therapy Assistant    AGUSTIN Delgado, OTR/L Occupational Therapist    RSIDDHARTHA Aceves, PT Student PT Student          Time Calculation:           OT G-codes  OT Professional Judgement Used?: Yes  OT Functional Scales Options: AM-PAC 6 Clicks Daily Activity  (OT)  Score: 16  Functional Limitation: Self care  Self Care Current Status (): At least 40 percent but less than 60 percent impaired, limited or restricted  Self Care Goal Status (): At least 20 percent but less than 40 percent impaired, limited or restricted    OT Discharge Summary  Anticipated Discharge Disposition: inpatient rehabilitation facility  Reason for Discharge: Discharge from facility  Outcomes Achieved: Refer to plan of care for updates on goals achieved  Discharge Destination: Inpatient rehabilitation facility    LUZ Gómez/MYNOR  7/2/2017

## 2017-07-03 LAB
ANION GAP SERPL CALCULATED.3IONS-SCNC: 16 MMOL/L (ref 7–19)
BASOPHILS ABSOLUTE: 0 K/UL (ref 0–0.2)
BASOPHILS RELATIVE PERCENT: 0.1 % (ref 0–1)
BUN BLDV-MCNC: 14 MG/DL (ref 8–23)
CALCIUM SERPL-MCNC: 8.2 MG/DL (ref 8.8–10.2)
CHLORIDE BLD-SCNC: 98 MMOL/L (ref 98–111)
CO2: 20 MMOL/L (ref 22–29)
CREAT SERPL-MCNC: 1.2 MG/DL (ref 0.5–1.2)
EOSINOPHILS ABSOLUTE: 0 K/UL (ref 0–0.6)
EOSINOPHILS RELATIVE PERCENT: 0.4 % (ref 0–5)
GFR NON-AFRICAN AMERICAN: 60
GLUCOSE BLD-MCNC: 119 MG/DL (ref 74–109)
HCT VFR BLD CALC: 32.2 % (ref 42–52)
HEMOGLOBIN: 11.5 G/DL (ref 14–18)
LYMPHOCYTES ABSOLUTE: 1.1 K/UL (ref 1.1–4.5)
LYMPHOCYTES RELATIVE PERCENT: 15.8 % (ref 20–40)
MAGNESIUM: 1.8 MG/DL (ref 1.6–2.4)
MCH RBC QN AUTO: 33.5 PG (ref 27–31)
MCHC RBC AUTO-ENTMCNC: 35.7 G/DL (ref 33–37)
MCV RBC AUTO: 93.9 FL (ref 80–94)
MONOCYTES ABSOLUTE: 0.7 K/UL (ref 0–0.9)
MONOCYTES RELATIVE PERCENT: 10 % (ref 0–10)
NEUTROPHILS ABSOLUTE: 5 K/UL (ref 1.5–7.5)
NEUTROPHILS RELATIVE PERCENT: 73.4 % (ref 50–65)
PDW BLD-RTO: 12 % (ref 11.5–14.5)
PLATELET # BLD: 169 K/UL (ref 130–400)
PMV BLD AUTO: 10 FL (ref 9.4–12.4)
POTASSIUM SERPL-SCNC: 3.7 MMOL/L (ref 3.5–5)
RBC # BLD: 3.43 M/UL (ref 4.7–6.1)
SODIUM BLD-SCNC: 134 MMOL/L (ref 136–145)
WBC # BLD: 6.8 K/UL (ref 4.8–10.8)

## 2017-07-03 PROCEDURE — 99232 SBSQ HOSP IP/OBS MODERATE 35: CPT | Performed by: PSYCHIATRY & NEUROLOGY

## 2017-07-03 PROCEDURE — 2580000003 HC RX 258: Performed by: INTERNAL MEDICINE

## 2017-07-03 PROCEDURE — 80048 BASIC METABOLIC PNL TOTAL CA: CPT

## 2017-07-03 PROCEDURE — 2580000003 HC RX 258: Performed by: PSYCHIATRY & NEUROLOGY

## 2017-07-03 PROCEDURE — 85025 COMPLETE CBC W/AUTO DIFF WBC: CPT

## 2017-07-03 PROCEDURE — 6370000000 HC RX 637 (ALT 250 FOR IP): Performed by: PSYCHIATRY & NEUROLOGY

## 2017-07-03 PROCEDURE — 1180000000 HC REHAB R&B

## 2017-07-03 PROCEDURE — 97110 THERAPEUTIC EXERCISES: CPT

## 2017-07-03 PROCEDURE — 83735 ASSAY OF MAGNESIUM: CPT

## 2017-07-03 PROCEDURE — 6360000002 HC RX W HCPCS: Performed by: PSYCHIATRY & NEUROLOGY

## 2017-07-03 PROCEDURE — 97532 HC COGNITIVE THERAPY 15 MIN: CPT

## 2017-07-03 PROCEDURE — 97116 GAIT TRAINING THERAPY: CPT

## 2017-07-03 PROCEDURE — 97530 THERAPEUTIC ACTIVITIES: CPT

## 2017-07-03 RX ORDER — AMITRIPTYLINE HYDROCHLORIDE 10 MG/1
10 TABLET, FILM COATED ORAL NIGHTLY
Status: DISCONTINUED | OUTPATIENT
Start: 2017-07-03 | End: 2017-07-05

## 2017-07-03 RX ORDER — MELOXICAM 7.5 MG/1
15 TABLET ORAL DAILY
Status: DISCONTINUED | OUTPATIENT
Start: 2017-07-03 | End: 2017-07-04

## 2017-07-03 RX ADMIN — ATORVASTATIN CALCIUM 40 MG: 40 TABLET, FILM COATED ORAL at 20:43

## 2017-07-03 RX ADMIN — SODIUM CHLORIDE 500 ML: 9 INJECTION, SOLUTION INTRAVENOUS at 19:43

## 2017-07-03 RX ADMIN — FLUCYTOSINE 1500 MG: 500 CAPSULE ORAL at 17:46

## 2017-07-03 RX ADMIN — FLUCYTOSINE 1500 MG: 500 CAPSULE ORAL at 00:57

## 2017-07-03 RX ADMIN — LISINOPRIL 20 MG: 20 TABLET ORAL at 08:32

## 2017-07-03 RX ADMIN — Medication 500000 UNITS: at 20:46

## 2017-07-03 RX ADMIN — AMPHOTERICIN B 240 MG: 50 INJECTION, POWDER, LYOPHILIZED, FOR SOLUTION INTRAVENOUS at 21:42

## 2017-07-03 RX ADMIN — DIPHENHYDRAMINE HYDROCHLORIDE 12.5 MG: 50 INJECTION, SOLUTION INTRAMUSCULAR; INTRAVENOUS at 20:43

## 2017-07-03 RX ADMIN — DEXTROSE MONOHYDRATE: 50 INJECTION, SOLUTION INTRAVENOUS at 01:42

## 2017-07-03 RX ADMIN — FLUCYTOSINE 1500 MG: 500 CAPSULE ORAL at 11:34

## 2017-07-03 RX ADMIN — ASPIRIN 81 MG CHEWABLE TABLET 81 MG: 81 TABLET CHEWABLE at 08:30

## 2017-07-03 RX ADMIN — DEXTROSE MONOHYDRATE: 50 INJECTION, SOLUTION INTRAVENOUS at 21:18

## 2017-07-03 RX ADMIN — DOCUSATE SODIUM 100 MG: 100 CAPSULE, LIQUID FILLED ORAL at 20:43

## 2017-07-03 RX ADMIN — MELOXICAM 15 MG: 7.5 TABLET ORAL at 08:30

## 2017-07-03 RX ADMIN — Medication 800 MG: at 08:32

## 2017-07-03 RX ADMIN — FLUCYTOSINE 1500 MG: 500 CAPSULE ORAL at 05:52

## 2017-07-03 RX ADMIN — AMITRIPTYLINE HYDROCHLORIDE 10 MG: 10 TABLET, FILM COATED ORAL at 20:42

## 2017-07-03 RX ADMIN — PANTOPRAZOLE SODIUM 40 MG: 40 TABLET, DELAYED RELEASE ORAL at 08:32

## 2017-07-03 RX ADMIN — DOCUSATE SODIUM 100 MG: 100 CAPSULE, LIQUID FILLED ORAL at 08:32

## 2017-07-03 RX ADMIN — POLYETHYLENE GLYCOL 3350 17 G: 17 POWDER, FOR SOLUTION ORAL at 08:33

## 2017-07-03 RX ADMIN — SODIUM CHLORIDE: 9 INJECTION, SOLUTION INTRAVENOUS at 11:34

## 2017-07-03 RX ADMIN — SODIUM CHLORIDE 500 ML: 9 INJECTION, SOLUTION INTRAVENOUS at 02:08

## 2017-07-03 ASSESSMENT — ENCOUNTER SYMPTOMS
VOMITING: 0
NAUSEA: 0

## 2017-07-03 ASSESSMENT — PAIN SCALES - GENERAL: PAINLEVEL_OUTOF10: 4

## 2017-07-04 LAB
ANION GAP SERPL CALCULATED.3IONS-SCNC: 12 MMOL/L (ref 7–19)
BILIRUBIN URINE: NEGATIVE
BLOOD, URINE: NEGATIVE
BUN BLDV-MCNC: 18 MG/DL (ref 8–23)
CALCIUM SERPL-MCNC: 8.3 MG/DL (ref 8.8–10.2)
CHLORIDE BLD-SCNC: 101 MMOL/L (ref 98–111)
CLARITY: CLEAR
CO2: 20 MMOL/L (ref 22–29)
COLOR: YELLOW
CREAT SERPL-MCNC: 1.4 MG/DL (ref 0.5–1.2)
GFR NON-AFRICAN AMERICAN: 50
GLUCOSE BLD-MCNC: 107 MG/DL (ref 74–109)
GLUCOSE URINE: NEGATIVE MG/DL
KETONES, URINE: NEGATIVE MG/DL
LEUKOCYTE ESTERASE, URINE: NEGATIVE
MAGNESIUM: 1.7 MG/DL (ref 1.6–2.4)
NITRITE, URINE: NEGATIVE
PH UA: 7
POTASSIUM SERPL-SCNC: 3.6 MMOL/L (ref 3.5–5)
PROTEIN UA: NEGATIVE MG/DL
SODIUM BLD-SCNC: 133 MMOL/L (ref 136–145)
SPECIFIC GRAVITY UA: 1.01
UROBILINOGEN, URINE: 1 E.U./DL

## 2017-07-04 PROCEDURE — 6370000000 HC RX 637 (ALT 250 FOR IP): Performed by: PSYCHIATRY & NEUROLOGY

## 2017-07-04 PROCEDURE — 2580000003 HC RX 258: Performed by: INTERNAL MEDICINE

## 2017-07-04 PROCEDURE — 81003 URINALYSIS AUTO W/O SCOPE: CPT

## 2017-07-04 PROCEDURE — 80048 BASIC METABOLIC PNL TOTAL CA: CPT

## 2017-07-04 PROCEDURE — 1180000000 HC REHAB R&B

## 2017-07-04 PROCEDURE — 97532 HC COGNITIVE THERAPY 15 MIN: CPT

## 2017-07-04 PROCEDURE — 97110 THERAPEUTIC EXERCISES: CPT

## 2017-07-04 PROCEDURE — 6360000002 HC RX W HCPCS: Performed by: PSYCHIATRY & NEUROLOGY

## 2017-07-04 PROCEDURE — 97535 SELF CARE MNGMENT TRAINING: CPT

## 2017-07-04 PROCEDURE — 97116 GAIT TRAINING THERAPY: CPT

## 2017-07-04 PROCEDURE — 87086 URINE CULTURE/COLONY COUNT: CPT

## 2017-07-04 PROCEDURE — 6370000000 HC RX 637 (ALT 250 FOR IP): Performed by: INTERNAL MEDICINE

## 2017-07-04 PROCEDURE — 2580000003 HC RX 258: Performed by: PSYCHIATRY & NEUROLOGY

## 2017-07-04 PROCEDURE — 97530 THERAPEUTIC ACTIVITIES: CPT

## 2017-07-04 PROCEDURE — 83735 ASSAY OF MAGNESIUM: CPT

## 2017-07-04 PROCEDURE — 99232 SBSQ HOSP IP/OBS MODERATE 35: CPT | Performed by: PSYCHIATRY & NEUROLOGY

## 2017-07-04 RX ORDER — POTASSIUM CHLORIDE 20 MEQ/1
20 TABLET, EXTENDED RELEASE ORAL DAILY
Status: DISCONTINUED | OUTPATIENT
Start: 2017-07-04 | End: 2017-07-06

## 2017-07-04 RX ORDER — FLUCYTOSINE 250 MG/1
1500 CAPSULE ORAL EVERY 6 HOURS
Status: COMPLETED | OUTPATIENT
Start: 2017-07-04 | End: 2017-07-05

## 2017-07-04 RX ORDER — FLUCYTOSINE 500 MG/1
1500 CAPSULE ORAL EVERY 6 HOURS SCHEDULED
Status: DISPENSED | OUTPATIENT
Start: 2017-07-05 | End: 2017-07-11

## 2017-07-04 RX ADMIN — Medication 800 MG: at 07:47

## 2017-07-04 RX ADMIN — SODIUM CHLORIDE: 9 INJECTION, SOLUTION INTRAVENOUS at 07:26

## 2017-07-04 RX ADMIN — FLUCYTOSINE 1500 MG: 250 CAPSULE ORAL at 07:46

## 2017-07-04 RX ADMIN — FLUCYTOSINE 1500 MG: 250 CAPSULE ORAL at 13:39

## 2017-07-04 RX ADMIN — DIPHENHYDRAMINE HYDROCHLORIDE 12.5 MG: 50 INJECTION, SOLUTION INTRAMUSCULAR; INTRAVENOUS at 20:04

## 2017-07-04 RX ADMIN — PANTOPRAZOLE SODIUM 40 MG: 40 TABLET, DELAYED RELEASE ORAL at 07:44

## 2017-07-04 RX ADMIN — POTASSIUM CHLORIDE 20 MEQ: 20 TABLET, EXTENDED RELEASE ORAL at 12:31

## 2017-07-04 RX ADMIN — DOCUSATE SODIUM 100 MG: 100 CAPSULE, LIQUID FILLED ORAL at 20:09

## 2017-07-04 RX ADMIN — SODIUM CHLORIDE 500 ML: 9 INJECTION, SOLUTION INTRAVENOUS at 19:20

## 2017-07-04 RX ADMIN — FLUCYTOSINE 1500 MG: 500 CAPSULE ORAL at 00:33

## 2017-07-04 RX ADMIN — POLYETHYLENE GLYCOL 3350 17 G: 17 POWDER, FOR SOLUTION ORAL at 07:47

## 2017-07-04 RX ADMIN — SODIUM CHLORIDE 500 ML: 9 INJECTION, SOLUTION INTRAVENOUS at 02:00

## 2017-07-04 RX ADMIN — Medication 500000 UNITS: at 18:05

## 2017-07-04 RX ADMIN — ASPIRIN 81 MG CHEWABLE TABLET 81 MG: 81 TABLET CHEWABLE at 07:44

## 2017-07-04 RX ADMIN — DOCUSATE SODIUM 100 MG: 100 CAPSULE, LIQUID FILLED ORAL at 07:43

## 2017-07-04 RX ADMIN — Medication 500000 UNITS: at 13:39

## 2017-07-04 RX ADMIN — ATORVASTATIN CALCIUM 40 MG: 40 TABLET, FILM COATED ORAL at 20:09

## 2017-07-04 RX ADMIN — Medication 500000 UNITS: at 07:47

## 2017-07-04 RX ADMIN — FLUCYTOSINE 1500 MG: 250 CAPSULE ORAL at 18:05

## 2017-07-04 RX ADMIN — LISINOPRIL 20 MG: 20 TABLET ORAL at 07:44

## 2017-07-04 RX ADMIN — AMPHOTERICIN B 240 MG: 50 INJECTION, POWDER, LYOPHILIZED, FOR SOLUTION INTRAVENOUS at 21:36

## 2017-07-04 RX ADMIN — SODIUM CHLORIDE: 9 INJECTION, SOLUTION INTRAVENOUS at 16:27

## 2017-07-04 RX ADMIN — DEXTROSE MONOHYDRATE: 50 INJECTION, SOLUTION INTRAVENOUS at 20:48

## 2017-07-04 RX ADMIN — DEXTROSE MONOHYDRATE: 50 INJECTION, SOLUTION INTRAVENOUS at 01:15

## 2017-07-05 LAB
ANION GAP SERPL CALCULATED.3IONS-SCNC: 14 MMOL/L (ref 7–19)
BUN BLDV-MCNC: 16 MG/DL (ref 8–23)
CALCIUM SERPL-MCNC: 8.5 MG/DL (ref 8.8–10.2)
CHLORIDE BLD-SCNC: 104 MMOL/L (ref 98–111)
CO2: 18 MMOL/L (ref 22–29)
CREAT SERPL-MCNC: 1.4 MG/DL (ref 0.5–1.2)
GFR NON-AFRICAN AMERICAN: 50
GLUCOSE BLD-MCNC: 136 MG/DL (ref 74–109)
MAGNESIUM: 1.6 MG/DL (ref 1.6–2.4)
POTASSIUM SERPL-SCNC: 3.4 MMOL/L (ref 3.5–5)
SODIUM BLD-SCNC: 136 MMOL/L (ref 136–145)

## 2017-07-05 PROCEDURE — 97116 GAIT TRAINING THERAPY: CPT

## 2017-07-05 PROCEDURE — 97530 THERAPEUTIC ACTIVITIES: CPT

## 2017-07-05 PROCEDURE — 97532 HC COGNITIVE THERAPY 15 MIN: CPT

## 2017-07-05 PROCEDURE — 1180000000 HC REHAB R&B

## 2017-07-05 PROCEDURE — 6370000000 HC RX 637 (ALT 250 FOR IP): Performed by: PSYCHIATRY & NEUROLOGY

## 2017-07-05 PROCEDURE — 6360000002 HC RX W HCPCS: Performed by: INTERNAL MEDICINE

## 2017-07-05 PROCEDURE — 6360000002 HC RX W HCPCS: Performed by: PSYCHIATRY & NEUROLOGY

## 2017-07-05 PROCEDURE — 2580000003 HC RX 258: Performed by: INTERNAL MEDICINE

## 2017-07-05 PROCEDURE — 6370000000 HC RX 637 (ALT 250 FOR IP): Performed by: INTERNAL MEDICINE

## 2017-07-05 PROCEDURE — 97110 THERAPEUTIC EXERCISES: CPT

## 2017-07-05 PROCEDURE — 99233 SBSQ HOSP IP/OBS HIGH 50: CPT | Performed by: PSYCHIATRY & NEUROLOGY

## 2017-07-05 PROCEDURE — 83735 ASSAY OF MAGNESIUM: CPT

## 2017-07-05 PROCEDURE — 80048 BASIC METABOLIC PNL TOTAL CA: CPT

## 2017-07-05 PROCEDURE — 6370000000 HC RX 637 (ALT 250 FOR IP): Performed by: PHYSICIAN ASSISTANT

## 2017-07-05 PROCEDURE — 2580000003 HC RX 258: Performed by: PSYCHIATRY & NEUROLOGY

## 2017-07-05 RX ORDER — POTASSIUM CHLORIDE 20 MEQ/1
20 TABLET, EXTENDED RELEASE ORAL ONCE
Status: COMPLETED | OUTPATIENT
Start: 2017-07-05 | End: 2017-07-05

## 2017-07-05 RX ADMIN — FLUCYTOSINE 1500 MG: 500 CAPSULE ORAL at 12:47

## 2017-07-05 RX ADMIN — LISINOPRIL 20 MG: 20 TABLET ORAL at 10:07

## 2017-07-05 RX ADMIN — ATORVASTATIN CALCIUM 40 MG: 40 TABLET, FILM COATED ORAL at 21:48

## 2017-07-05 RX ADMIN — Medication 800 MG: at 10:07

## 2017-07-05 RX ADMIN — POTASSIUM CHLORIDE 20 MEQ: 20 TABLET, EXTENDED RELEASE ORAL at 17:44

## 2017-07-05 RX ADMIN — DEXTROSE MONOHYDRATE: 50 INJECTION, SOLUTION INTRAVENOUS at 21:14

## 2017-07-05 RX ADMIN — SODIUM CHLORIDE 500 ML: 9 INJECTION, SOLUTION INTRAVENOUS at 20:00

## 2017-07-05 RX ADMIN — ASPIRIN 81 MG CHEWABLE TABLET 81 MG: 81 TABLET CHEWABLE at 10:07

## 2017-07-05 RX ADMIN — SODIUM CHLORIDE: 9 INJECTION, SOLUTION INTRAVENOUS at 06:01

## 2017-07-05 RX ADMIN — DOCUSATE SODIUM 100 MG: 100 CAPSULE, LIQUID FILLED ORAL at 10:07

## 2017-07-05 RX ADMIN — Medication 500000 UNITS: at 10:08

## 2017-07-05 RX ADMIN — DEXTROSE MONOHYDRATE: 50 INJECTION, SOLUTION INTRAVENOUS at 01:38

## 2017-07-05 RX ADMIN — DOCUSATE SODIUM 100 MG: 100 CAPSULE, LIQUID FILLED ORAL at 21:48

## 2017-07-05 RX ADMIN — POTASSIUM CHLORIDE 20 MEQ: 20 TABLET, EXTENDED RELEASE ORAL at 10:07

## 2017-07-05 RX ADMIN — FLUCYTOSINE 1500 MG: 250 CAPSULE ORAL at 00:32

## 2017-07-05 RX ADMIN — AMPHOTERICIN B 240 MG: 50 INJECTION, POWDER, LYOPHILIZED, FOR SOLUTION INTRAVENOUS at 21:32

## 2017-07-05 RX ADMIN — FLUCYTOSINE 1500 MG: 500 CAPSULE ORAL at 17:23

## 2017-07-05 RX ADMIN — DIPHENHYDRAMINE HYDROCHLORIDE 6.5 MG: 50 INJECTION, SOLUTION INTRAMUSCULAR; INTRAVENOUS at 20:48

## 2017-07-05 RX ADMIN — SODIUM CHLORIDE 500 ML: 9 INJECTION, SOLUTION INTRAVENOUS at 02:23

## 2017-07-05 RX ADMIN — POLYETHYLENE GLYCOL 3350 17 G: 17 POWDER, FOR SOLUTION ORAL at 10:07

## 2017-07-05 RX ADMIN — PANTOPRAZOLE SODIUM 40 MG: 40 TABLET, DELAYED RELEASE ORAL at 10:07

## 2017-07-05 ASSESSMENT — PAIN SCALES - GENERAL: PAINLEVEL_OUTOF10: 0

## 2017-07-05 ASSESSMENT — ENCOUNTER SYMPTOMS
VOMITING: 0
NAUSEA: 0

## 2017-07-06 PROBLEM — R40.0 DROWSINESS: Status: ACTIVE | Noted: 2017-07-06

## 2017-07-06 LAB
ANION GAP SERPL CALCULATED.3IONS-SCNC: 14 MMOL/L (ref 7–19)
APTT: 27.7 SEC (ref 26–36.2)
BASOPHILS ABSOLUTE: 0 K/UL (ref 0–0.2)
BASOPHILS RELATIVE PERCENT: 0.3 % (ref 0–1)
BUN BLDV-MCNC: 17 MG/DL (ref 8–23)
CALCIUM SERPL-MCNC: 8.5 MG/DL (ref 8.8–10.2)
CHLORIDE BLD-SCNC: 103 MMOL/L (ref 98–111)
CO2: 19 MMOL/L (ref 22–29)
CREAT SERPL-MCNC: 1.4 MG/DL (ref 0.5–1.2)
EOSINOPHILS ABSOLUTE: 0 K/UL (ref 0–0.6)
EOSINOPHILS RELATIVE PERCENT: 0.5 % (ref 0–5)
GFR NON-AFRICAN AMERICAN: 50
GLUCOSE BLD-MCNC: 134 MG/DL (ref 74–109)
HCT VFR BLD CALC: 30.9 % (ref 42–52)
HEMOGLOBIN: 10.7 G/DL (ref 14–18)
INR BLD: 1.2 (ref 0.88–1.18)
LYMPHOCYTES ABSOLUTE: 1 K/UL (ref 1.1–4.5)
LYMPHOCYTES RELATIVE PERCENT: 16.1 % (ref 20–40)
MAGNESIUM: 1.4 MG/DL (ref 1.6–2.4)
MCH RBC QN AUTO: 32.7 PG (ref 27–31)
MCHC RBC AUTO-ENTMCNC: 34.6 G/DL (ref 33–37)
MCV RBC AUTO: 94.5 FL (ref 80–94)
MONOCYTES ABSOLUTE: 0.7 K/UL (ref 0–0.9)
MONOCYTES RELATIVE PERCENT: 10.3 % (ref 0–10)
NEUTROPHILS ABSOLUTE: 4.6 K/UL (ref 1.5–7.5)
NEUTROPHILS RELATIVE PERCENT: 71.9 % (ref 50–65)
PDW BLD-RTO: 12.3 % (ref 11.5–14.5)
PLATELET # BLD: 167 K/UL (ref 130–400)
PMV BLD AUTO: 10 FL (ref 9.4–12.4)
POTASSIUM SERPL-SCNC: 3.5 MMOL/L (ref 3.5–5)
PROTHROMBIN TIME: 15.2 SEC (ref 12–14.6)
RBC # BLD: 3.27 M/UL (ref 4.7–6.1)
SODIUM BLD-SCNC: 136 MMOL/L (ref 136–145)
WBC # BLD: 6.3 K/UL (ref 4.8–10.8)

## 2017-07-06 PROCEDURE — 80048 BASIC METABOLIC PNL TOTAL CA: CPT

## 2017-07-06 PROCEDURE — 83735 ASSAY OF MAGNESIUM: CPT

## 2017-07-06 PROCEDURE — 85730 THROMBOPLASTIN TIME PARTIAL: CPT

## 2017-07-06 PROCEDURE — 1180000000 HC REHAB R&B

## 2017-07-06 PROCEDURE — 97532 HC COGNITIVE THERAPY 15 MIN: CPT

## 2017-07-06 PROCEDURE — 97535 SELF CARE MNGMENT TRAINING: CPT

## 2017-07-06 PROCEDURE — 2580000003 HC RX 258: Performed by: INTERNAL MEDICINE

## 2017-07-06 PROCEDURE — 85610 PROTHROMBIN TIME: CPT

## 2017-07-06 PROCEDURE — 6360000002 HC RX W HCPCS: Performed by: PSYCHIATRY & NEUROLOGY

## 2017-07-06 PROCEDURE — 85025 COMPLETE CBC W/AUTO DIFF WBC: CPT

## 2017-07-06 PROCEDURE — 2580000003 HC RX 258: Performed by: PSYCHIATRY & NEUROLOGY

## 2017-07-06 PROCEDURE — 97110 THERAPEUTIC EXERCISES: CPT

## 2017-07-06 PROCEDURE — 6370000000 HC RX 637 (ALT 250 FOR IP): Performed by: PHYSICIAN ASSISTANT

## 2017-07-06 PROCEDURE — 97530 THERAPEUTIC ACTIVITIES: CPT

## 2017-07-06 PROCEDURE — 97116 GAIT TRAINING THERAPY: CPT

## 2017-07-06 PROCEDURE — 6360000002 HC RX W HCPCS: Performed by: INTERNAL MEDICINE

## 2017-07-06 PROCEDURE — 6370000000 HC RX 637 (ALT 250 FOR IP): Performed by: INTERNAL MEDICINE

## 2017-07-06 PROCEDURE — 6370000000 HC RX 637 (ALT 250 FOR IP): Performed by: PSYCHIATRY & NEUROLOGY

## 2017-07-06 RX ORDER — POTASSIUM CHLORIDE 20 MEQ/1
20 TABLET, EXTENDED RELEASE ORAL 2 TIMES DAILY
Status: DISCONTINUED | OUTPATIENT
Start: 2017-07-06 | End: 2017-07-11 | Stop reason: HOSPADM

## 2017-07-06 RX ADMIN — DEXTROSE MONOHYDRATE 50 ML: 50 INJECTION, SOLUTION INTRAVENOUS at 21:01

## 2017-07-06 RX ADMIN — FLUCYTOSINE 1500 MG: 500 CAPSULE ORAL at 17:36

## 2017-07-06 RX ADMIN — PANTOPRAZOLE SODIUM 40 MG: 40 TABLET, DELAYED RELEASE ORAL at 08:39

## 2017-07-06 RX ADMIN — SODIUM CHLORIDE 500 ML: 9 INJECTION, SOLUTION INTRAVENOUS at 20:06

## 2017-07-06 RX ADMIN — AMPHOTERICIN B 240 MG: 50 INJECTION, POWDER, LYOPHILIZED, FOR SOLUTION INTRAVENOUS at 21:33

## 2017-07-06 RX ADMIN — LISINOPRIL 20 MG: 20 TABLET ORAL at 08:39

## 2017-07-06 RX ADMIN — POTASSIUM CHLORIDE 20 MEQ: 20 TABLET, EXTENDED RELEASE ORAL at 21:33

## 2017-07-06 RX ADMIN — SODIUM CHLORIDE: 9 INJECTION, SOLUTION INTRAVENOUS at 02:47

## 2017-07-06 RX ADMIN — DOCUSATE SODIUM 100 MG: 100 CAPSULE, LIQUID FILLED ORAL at 08:40

## 2017-07-06 RX ADMIN — SODIUM CHLORIDE 500 ML: 9 INJECTION, SOLUTION INTRAVENOUS at 02:11

## 2017-07-06 RX ADMIN — FLUCYTOSINE 1500 MG: 500 CAPSULE ORAL at 00:14

## 2017-07-06 RX ADMIN — ATORVASTATIN CALCIUM 40 MG: 40 TABLET, FILM COATED ORAL at 21:33

## 2017-07-06 RX ADMIN — POTASSIUM CHLORIDE 20 MEQ: 20 TABLET, EXTENDED RELEASE ORAL at 08:40

## 2017-07-06 RX ADMIN — DOCUSATE SODIUM 100 MG: 100 CAPSULE, LIQUID FILLED ORAL at 21:33

## 2017-07-06 RX ADMIN — DEXTROSE MONOHYDRATE: 50 INJECTION, SOLUTION INTRAVENOUS at 01:13

## 2017-07-06 RX ADMIN — FLUCYTOSINE 1500 MG: 500 CAPSULE ORAL at 05:50

## 2017-07-06 RX ADMIN — MAGNESIUM SULFATE HEPTAHYDRATE 2 G: 500 INJECTION, SOLUTION INTRAMUSCULAR; INTRAVENOUS at 16:10

## 2017-07-06 RX ADMIN — Medication 800 MG: at 08:40

## 2017-07-06 RX ADMIN — FLUCYTOSINE 1500 MG: 500 CAPSULE ORAL at 12:16

## 2017-07-06 RX ADMIN — DIPHENHYDRAMINE HYDROCHLORIDE 6.5 MG: 50 INJECTION, SOLUTION INTRAMUSCULAR; INTRAVENOUS at 20:41

## 2017-07-06 ASSESSMENT — ENCOUNTER SYMPTOMS
VOMITING: 0
NAUSEA: 0

## 2017-07-06 ASSESSMENT — PAIN SCALES - GENERAL: PAINLEVEL_OUTOF10: 0

## 2017-07-07 ENCOUNTER — APPOINTMENT (OUTPATIENT)
Dept: GENERAL RADIOLOGY | Age: 72
DRG: 097 | End: 2017-07-07
Attending: PSYCHIATRY & NEUROLOGY
Payer: MEDICARE

## 2017-07-07 LAB
ANION GAP SERPL CALCULATED.3IONS-SCNC: 14 MMOL/L (ref 7–19)
APPEARANCE CSF: CLEAR
BUN BLDV-MCNC: 19 MG/DL (ref 8–23)
CALCIUM SERPL-MCNC: 8.4 MG/DL (ref 8.8–10.2)
CHLORIDE BLD-SCNC: 104 MMOL/L (ref 98–111)
CLOT EVALUATION CSF: ABNORMAL
CO2: 19 MMOL/L (ref 22–29)
COLOR CSF: COLORLESS
CREAT SERPL-MCNC: 1.5 MG/DL (ref 0.5–1.2)
GFR NON-AFRICAN AMERICAN: 46
GLUCOSE BLD-MCNC: 119 MG/DL (ref 74–109)
GLUCOSE, CSF: 18 MG/DL (ref 40–70)
LYMPHS CSF: 95 % (ref 40–80)
MAGNESIUM: 1.6 MG/DL (ref 1.6–2.4)
MONOCYTE, CSF: 5 % (ref 15–45)
NEUTROPHILS, CSF: 0 % (ref 0–6)
NUMBER OF CELLS CSF: 100
POTASSIUM SERPL-SCNC: 3.7 MMOL/L (ref 3.5–5)
PROTEIN CSF: 180 MG/DL (ref 15–45)
RBC CSF: 10 /CUMM (ref 0–5)
SODIUM BLD-SCNC: 137 MMOL/L (ref 136–145)
TUBE NUMBER CSF: ABNORMAL
URINE CULTURE, ROUTINE: NORMAL
WBC CSF: 145 /CUMM (ref 0–8)

## 2017-07-07 PROCEDURE — 87070 CULTURE OTHR SPECIMN AEROBIC: CPT

## 2017-07-07 PROCEDURE — 6360000002 HC RX W HCPCS: Performed by: INTERNAL MEDICINE

## 2017-07-07 PROCEDURE — 82787 IGG 1 2 3 OR 4 EACH: CPT

## 2017-07-07 PROCEDURE — 87205 SMEAR GRAM STAIN: CPT

## 2017-07-07 PROCEDURE — 97535 SELF CARE MNGMENT TRAINING: CPT

## 2017-07-07 PROCEDURE — 2580000003 HC RX 258: Performed by: INTERNAL MEDICINE

## 2017-07-07 PROCEDURE — 97110 THERAPEUTIC EXERCISES: CPT

## 2017-07-07 PROCEDURE — 2580000003 HC RX 258: Performed by: PSYCHIATRY & NEUROLOGY

## 2017-07-07 PROCEDURE — 97116 GAIT TRAINING THERAPY: CPT

## 2017-07-07 PROCEDURE — B01BZZZ FLUOROSCOPY OF SPINAL CORD: ICD-10-PCS | Performed by: RADIOLOGY

## 2017-07-07 PROCEDURE — 99232 SBSQ HOSP IP/OBS MODERATE 35: CPT | Performed by: PSYCHIATRY & NEUROLOGY

## 2017-07-07 PROCEDURE — 1180000000 HC REHAB R&B

## 2017-07-07 PROCEDURE — 6360000002 HC RX W HCPCS: Performed by: PSYCHIATRY & NEUROLOGY

## 2017-07-07 PROCEDURE — 87102 FUNGUS ISOLATION CULTURE: CPT

## 2017-07-07 PROCEDURE — 62270 DX LMBR SPI PNXR: CPT

## 2017-07-07 PROCEDURE — 82945 GLUCOSE OTHER FLUID: CPT

## 2017-07-07 PROCEDURE — 6370000000 HC RX 637 (ALT 250 FOR IP): Performed by: INTERNAL MEDICINE

## 2017-07-07 PROCEDURE — 97530 THERAPEUTIC ACTIVITIES: CPT

## 2017-07-07 PROCEDURE — 80048 BASIC METABOLIC PNL TOTAL CA: CPT

## 2017-07-07 PROCEDURE — 87075 CULTR BACTERIA EXCEPT BLOOD: CPT

## 2017-07-07 PROCEDURE — 97532 HC COGNITIVE THERAPY 15 MIN: CPT

## 2017-07-07 PROCEDURE — 89050 BODY FLUID CELL COUNT: CPT

## 2017-07-07 PROCEDURE — 88185 FLOWCYTOMETRY/TC ADD-ON: CPT

## 2017-07-07 PROCEDURE — 83735 ASSAY OF MAGNESIUM: CPT

## 2017-07-07 PROCEDURE — 6370000000 HC RX 637 (ALT 250 FOR IP): Performed by: PSYCHIATRY & NEUROLOGY

## 2017-07-07 PROCEDURE — 009U3ZX DRAINAGE OF SPINAL CANAL, PERCUTANEOUS APPROACH, DIAGNOSTIC: ICD-10-PCS | Performed by: RADIOLOGY

## 2017-07-07 PROCEDURE — 88184 FLOWCYTOMETRY/ TC 1 MARKER: CPT

## 2017-07-07 PROCEDURE — 84157 ASSAY OF PROTEIN OTHER: CPT

## 2017-07-07 RX ORDER — 0.9 % SODIUM CHLORIDE 0.9 %
1000 INTRAVENOUS SOLUTION INTRAVENOUS ONCE
Status: COMPLETED | OUTPATIENT
Start: 2017-07-07 | End: 2017-07-07

## 2017-07-07 RX ORDER — VALACYCLOVIR HYDROCHLORIDE 500 MG/1
1000 TABLET, FILM COATED ORAL 2 TIMES DAILY
Status: COMPLETED | OUTPATIENT
Start: 2017-07-07 | End: 2017-07-08

## 2017-07-07 RX ORDER — PREGABALIN 50 MG/1
50 CAPSULE ORAL 2 TIMES DAILY PRN
Status: DISCONTINUED | OUTPATIENT
Start: 2017-07-07 | End: 2017-07-11 | Stop reason: HOSPADM

## 2017-07-07 RX ADMIN — SODIUM CHLORIDE: 9 INJECTION, SOLUTION INTRAVENOUS at 02:53

## 2017-07-07 RX ADMIN — ATORVASTATIN CALCIUM 40 MG: 40 TABLET, FILM COATED ORAL at 21:44

## 2017-07-07 RX ADMIN — SODIUM CHLORIDE 1000 ML: 9 INJECTION, SOLUTION INTRAVENOUS at 15:16

## 2017-07-07 RX ADMIN — PREGABALIN 50 MG: 50 CAPSULE ORAL at 17:24

## 2017-07-07 RX ADMIN — LISINOPRIL 20 MG: 20 TABLET ORAL at 08:37

## 2017-07-07 RX ADMIN — DOCUSATE SODIUM 100 MG: 100 CAPSULE, LIQUID FILLED ORAL at 21:44

## 2017-07-07 RX ADMIN — DEXTROSE MONOHYDRATE 50 ML: 50 INJECTION, SOLUTION INTRAVENOUS at 00:02

## 2017-07-07 RX ADMIN — PANTOPRAZOLE SODIUM 40 MG: 40 TABLET, DELAYED RELEASE ORAL at 08:37

## 2017-07-07 RX ADMIN — DIPHENHYDRAMINE HYDROCHLORIDE 6.5 MG: 50 INJECTION, SOLUTION INTRAMUSCULAR; INTRAVENOUS at 21:28

## 2017-07-07 RX ADMIN — POTASSIUM CHLORIDE 20 MEQ: 20 TABLET, EXTENDED RELEASE ORAL at 21:44

## 2017-07-07 RX ADMIN — FLUCYTOSINE 1500 MG: 500 CAPSULE ORAL at 05:52

## 2017-07-07 RX ADMIN — POTASSIUM CHLORIDE 20 MEQ: 20 TABLET, EXTENDED RELEASE ORAL at 08:37

## 2017-07-07 RX ADMIN — SODIUM CHLORIDE 500 ML: 9 INJECTION, SOLUTION INTRAVENOUS at 00:36

## 2017-07-07 RX ADMIN — SODIUM CHLORIDE 500 ML: 9 INJECTION, SOLUTION INTRAVENOUS at 20:02

## 2017-07-07 RX ADMIN — DEXTROSE MONOHYDRATE: 50 INJECTION, SOLUTION INTRAVENOUS at 21:46

## 2017-07-07 RX ADMIN — FLUCYTOSINE 1500 MG: 500 CAPSULE ORAL at 12:01

## 2017-07-07 RX ADMIN — FLUCYTOSINE 1500 MG: 500 CAPSULE ORAL at 00:05

## 2017-07-07 RX ADMIN — Medication 800 MG: at 08:37

## 2017-07-07 RX ADMIN — FLUCYTOSINE 1500 MG: 500 CAPSULE ORAL at 17:17

## 2017-07-07 RX ADMIN — VALACYCLOVIR 1000 MG: 500 TABLET, FILM COATED ORAL at 21:44

## 2017-07-07 RX ADMIN — AMPHOTERICIN B 240 MG: 50 INJECTION, POWDER, LYOPHILIZED, FOR SOLUTION INTRAVENOUS at 22:14

## 2017-07-07 RX ADMIN — DOCUSATE SODIUM 100 MG: 100 CAPSULE, LIQUID FILLED ORAL at 08:37

## 2017-07-07 ASSESSMENT — ENCOUNTER SYMPTOMS
VOMITING: 0
NAUSEA: 1

## 2017-07-07 ASSESSMENT — PAIN SCALES - GENERAL: PAINLEVEL_OUTOF10: 0

## 2017-07-08 LAB
ANION GAP SERPL CALCULATED.3IONS-SCNC: 15 MMOL/L (ref 7–19)
BUN BLDV-MCNC: 18 MG/DL (ref 8–23)
CALCIUM SERPL-MCNC: 8.1 MG/DL (ref 8.8–10.2)
CHLORIDE BLD-SCNC: 103 MMOL/L (ref 98–111)
CO2: 19 MMOL/L (ref 22–29)
CREAT SERPL-MCNC: 1.4 MG/DL (ref 0.5–1.2)
GFR NON-AFRICAN AMERICAN: 50
GLUCOSE BLD-MCNC: 125 MG/DL (ref 74–109)
MAGNESIUM: 1.4 MG/DL (ref 1.6–2.4)
POTASSIUM SERPL-SCNC: 3.7 MMOL/L (ref 3.5–5)
SODIUM BLD-SCNC: 137 MMOL/L (ref 136–145)

## 2017-07-08 PROCEDURE — 97110 THERAPEUTIC EXERCISES: CPT

## 2017-07-08 PROCEDURE — 6370000000 HC RX 637 (ALT 250 FOR IP): Performed by: PSYCHIATRY & NEUROLOGY

## 2017-07-08 PROCEDURE — 6360000002 HC RX W HCPCS: Performed by: PSYCHIATRY & NEUROLOGY

## 2017-07-08 PROCEDURE — 97116 GAIT TRAINING THERAPY: CPT

## 2017-07-08 PROCEDURE — 2580000003 HC RX 258: Performed by: INTERNAL MEDICINE

## 2017-07-08 PROCEDURE — 6370000000 HC RX 637 (ALT 250 FOR IP): Performed by: INTERNAL MEDICINE

## 2017-07-08 PROCEDURE — 83735 ASSAY OF MAGNESIUM: CPT

## 2017-07-08 PROCEDURE — 80048 BASIC METABOLIC PNL TOTAL CA: CPT

## 2017-07-08 PROCEDURE — 2580000003 HC RX 258: Performed by: PSYCHIATRY & NEUROLOGY

## 2017-07-08 PROCEDURE — 6360000002 HC RX W HCPCS: Performed by: INTERNAL MEDICINE

## 2017-07-08 PROCEDURE — 99232 SBSQ HOSP IP/OBS MODERATE 35: CPT | Performed by: PSYCHIATRY & NEUROLOGY

## 2017-07-08 PROCEDURE — 1180000000 HC REHAB R&B

## 2017-07-08 RX ADMIN — DIPHENHYDRAMINE HYDROCHLORIDE 6.5 MG: 50 INJECTION, SOLUTION INTRAMUSCULAR; INTRAVENOUS at 20:39

## 2017-07-08 RX ADMIN — MAGNESIUM SULFATE HEPTAHYDRATE 2 G: 500 INJECTION, SOLUTION INTRAMUSCULAR; INTRAVENOUS at 09:25

## 2017-07-08 RX ADMIN — SODIUM CHLORIDE 500 ML: 9 INJECTION, SOLUTION INTRAVENOUS at 01:51

## 2017-07-08 RX ADMIN — FLUCYTOSINE 1500 MG: 500 CAPSULE ORAL at 00:39

## 2017-07-08 RX ADMIN — PANTOPRAZOLE SODIUM 40 MG: 40 TABLET, DELAYED RELEASE ORAL at 09:09

## 2017-07-08 RX ADMIN — FLUCYTOSINE 1500 MG: 500 CAPSULE ORAL at 06:15

## 2017-07-08 RX ADMIN — DEXTROSE MONOHYDRATE: 50 INJECTION, SOLUTION INTRAVENOUS at 20:59

## 2017-07-08 RX ADMIN — POLYETHYLENE GLYCOL 3350 17 G: 17 POWDER, FOR SOLUTION ORAL at 09:09

## 2017-07-08 RX ADMIN — SODIUM CHLORIDE: 9 INJECTION, SOLUTION INTRAVENOUS at 06:16

## 2017-07-08 RX ADMIN — LISINOPRIL 20 MG: 20 TABLET ORAL at 09:09

## 2017-07-08 RX ADMIN — DOCUSATE SODIUM 100 MG: 100 CAPSULE, LIQUID FILLED ORAL at 20:38

## 2017-07-08 RX ADMIN — DEXTROSE MONOHYDRATE: 50 INJECTION, SOLUTION INTRAVENOUS at 23:38

## 2017-07-08 RX ADMIN — DEXTROSE MONOHYDRATE: 50 INJECTION, SOLUTION INTRAVENOUS at 01:17

## 2017-07-08 RX ADMIN — FLUCYTOSINE 1500 MG: 500 CAPSULE ORAL at 13:16

## 2017-07-08 RX ADMIN — ATORVASTATIN CALCIUM 40 MG: 40 TABLET, FILM COATED ORAL at 20:38

## 2017-07-08 RX ADMIN — VALACYCLOVIR 1000 MG: 500 TABLET, FILM COATED ORAL at 09:10

## 2017-07-08 RX ADMIN — FLUCYTOSINE 1500 MG: 500 CAPSULE ORAL at 18:00

## 2017-07-08 RX ADMIN — AMPHOTERICIN B 240 MG: 50 INJECTION, POWDER, LYOPHILIZED, FOR SOLUTION INTRAVENOUS at 21:34

## 2017-07-08 RX ADMIN — POTASSIUM CHLORIDE 20 MEQ: 20 TABLET, EXTENDED RELEASE ORAL at 20:38

## 2017-07-08 RX ADMIN — SODIUM CHLORIDE 500 ML: 9 INJECTION, SOLUTION INTRAVENOUS at 20:09

## 2017-07-08 RX ADMIN — Medication 800 MG: at 09:09

## 2017-07-08 RX ADMIN — POTASSIUM CHLORIDE 20 MEQ: 20 TABLET, EXTENDED RELEASE ORAL at 09:10

## 2017-07-08 RX ADMIN — DOCUSATE SODIUM 100 MG: 100 CAPSULE, LIQUID FILLED ORAL at 09:09

## 2017-07-08 RX ADMIN — PREGABALIN 50 MG: 50 CAPSULE ORAL at 19:08

## 2017-07-08 ASSESSMENT — PAIN SCALES - GENERAL
PAINLEVEL_OUTOF10: 0
PAINLEVEL_OUTOF10: 0

## 2017-07-08 ASSESSMENT — ENCOUNTER SYMPTOMS
NAUSEA: 0
VOMITING: 0

## 2017-07-09 PROBLEM — E87.6 HYPOKALEMIA: Status: ACTIVE | Noted: 2017-07-09

## 2017-07-09 PROBLEM — R63.0 ANOREXIA: Status: ACTIVE | Noted: 2017-07-09

## 2017-07-09 LAB
% CD 3 POS. LYMPH.: 94 %
% CD10: <1 %
% CD13: <1 %
% CD16: 3 %
% CD20: 1 %
% CD34: <1 %
% CD45: NORMAL %
% CD4: 58 %
% CD5: 94 %
% CD7: 82 %
% KAPPA: NORMAL %
% LAMBDA: NORMAL %
ANION GAP SERPL CALCULATED.3IONS-SCNC: 14 MMOL/L (ref 7–19)
BUN BLDV-MCNC: 18 MG/DL (ref 8–23)
CALCIUM SERPL-MCNC: 8.3 MG/DL (ref 8.8–10.2)
CD19 %: 1 %
CD8 % POS LYMPH: 22 %
CHLORIDE BLD-SCNC: 104 MMOL/L (ref 98–111)
CO2: 19 MMOL/L (ref 22–29)
CREAT SERPL-MCNC: 1.5 MG/DL (ref 0.5–1.2)
GFR NON-AFRICAN AMERICAN: 46
GLUCOSE BLD-MCNC: 123 MG/DL (ref 74–109)
INTERPRETATION: NORMAL
LEUKEMIA/LYMPHOMA PHENOTYPE: 98 %
Lab: 8 %
MAGNESIUM: 1.7 MG/DL (ref 1.6–2.4)
NUMBER OF MARKERS: 16 MARKERS
POTASSIUM SERPL-SCNC: 3.9 MMOL/L (ref 3.5–5)
PROF LEUK/LYMPH PHENO 16 OR MORE MARKERS: NORMAL
SODIUM BLD-SCNC: 137 MMOL/L (ref 136–145)

## 2017-07-09 PROCEDURE — 1180000000 HC REHAB R&B

## 2017-07-09 PROCEDURE — 6370000000 HC RX 637 (ALT 250 FOR IP): Performed by: INTERNAL MEDICINE

## 2017-07-09 PROCEDURE — 2580000003 HC RX 258: Performed by: PSYCHIATRY & NEUROLOGY

## 2017-07-09 PROCEDURE — 83735 ASSAY OF MAGNESIUM: CPT

## 2017-07-09 PROCEDURE — 80048 BASIC METABOLIC PNL TOTAL CA: CPT

## 2017-07-09 PROCEDURE — 6360000002 HC RX W HCPCS: Performed by: PSYCHIATRY & NEUROLOGY

## 2017-07-09 PROCEDURE — 6370000000 HC RX 637 (ALT 250 FOR IP): Performed by: PSYCHIATRY & NEUROLOGY

## 2017-07-09 PROCEDURE — 2580000003 HC RX 258: Performed by: INTERNAL MEDICINE

## 2017-07-09 PROCEDURE — 6360000002 HC RX W HCPCS: Performed by: INTERNAL MEDICINE

## 2017-07-09 RX ADMIN — Medication 800 MG: at 08:41

## 2017-07-09 RX ADMIN — SODIUM CHLORIDE: 9 INJECTION, SOLUTION INTRAVENOUS at 18:12

## 2017-07-09 RX ADMIN — FLUCYTOSINE 1500 MG: 500 CAPSULE ORAL at 17:41

## 2017-07-09 RX ADMIN — LISINOPRIL 20 MG: 20 TABLET ORAL at 08:42

## 2017-07-09 RX ADMIN — POTASSIUM CHLORIDE 20 MEQ: 20 TABLET, EXTENDED RELEASE ORAL at 08:41

## 2017-07-09 RX ADMIN — FLUCYTOSINE 1500 MG: 500 CAPSULE ORAL at 06:28

## 2017-07-09 RX ADMIN — FLUCYTOSINE 1500 MG: 500 CAPSULE ORAL at 11:52

## 2017-07-09 RX ADMIN — SODIUM CHLORIDE 500 ML: 9 INJECTION, SOLUTION INTRAVENOUS at 00:12

## 2017-07-09 RX ADMIN — FLUCYTOSINE 1500 MG: 500 CAPSULE ORAL at 00:16

## 2017-07-09 RX ADMIN — ATORVASTATIN CALCIUM 40 MG: 40 TABLET, FILM COATED ORAL at 20:45

## 2017-07-09 RX ADMIN — SODIUM CHLORIDE: 9 INJECTION, SOLUTION INTRAVENOUS at 09:58

## 2017-07-09 RX ADMIN — DEXTROSE MONOHYDRATE: 50 INJECTION, SOLUTION INTRAVENOUS at 21:12

## 2017-07-09 RX ADMIN — POTASSIUM CHLORIDE 20 MEQ: 20 TABLET, EXTENDED RELEASE ORAL at 20:45

## 2017-07-09 RX ADMIN — NYSTATIN 500000 UNITS: 100000 SUSPENSION ORAL at 08:42

## 2017-07-09 RX ADMIN — AMPHOTERICIN B 240 MG: 50 INJECTION, POWDER, LYOPHILIZED, FOR SOLUTION INTRAVENOUS at 21:59

## 2017-07-09 RX ADMIN — DIPHENHYDRAMINE HYDROCHLORIDE 6.5 MG: 50 INJECTION, SOLUTION INTRAMUSCULAR; INTRAVENOUS at 20:46

## 2017-07-09 RX ADMIN — PANTOPRAZOLE SODIUM 40 MG: 40 TABLET, DELAYED RELEASE ORAL at 08:41

## 2017-07-09 RX ADMIN — SODIUM CHLORIDE 1000 ML: 9 INJECTION, SOLUTION INTRAVENOUS at 00:12

## 2017-07-09 RX ADMIN — SODIUM CHLORIDE 500 ML: 9 INJECTION, SOLUTION INTRAVENOUS at 20:15

## 2017-07-10 LAB
ANION GAP SERPL CALCULATED.3IONS-SCNC: 16 MMOL/L (ref 7–19)
BASOPHILS ABSOLUTE: 0 K/UL (ref 0–0.2)
BASOPHILS RELATIVE PERCENT: 0.3 % (ref 0–1)
BUN BLDV-MCNC: 18 MG/DL (ref 8–23)
CALCIUM SERPL-MCNC: 8.5 MG/DL (ref 8.8–10.2)
CHLORIDE BLD-SCNC: 102 MMOL/L (ref 98–111)
CO2: 19 MMOL/L (ref 22–29)
CREAT SERPL-MCNC: 1.6 MG/DL (ref 0.5–1.2)
EOSINOPHILS ABSOLUTE: 0.1 K/UL (ref 0–0.6)
EOSINOPHILS RELATIVE PERCENT: 0.8 % (ref 0–5)
GFR NON-AFRICAN AMERICAN: 43
GLUCOSE BLD-MCNC: 130 MG/DL (ref 74–109)
HCT VFR BLD CALC: 30.3 % (ref 42–52)
HEMOGLOBIN: 10.5 G/DL (ref 14–18)
LYMPHOCYTES ABSOLUTE: 1.2 K/UL (ref 1.1–4.5)
LYMPHOCYTES RELATIVE PERCENT: 18.1 % (ref 20–40)
MAGNESIUM: 1.4 MG/DL (ref 1.6–2.4)
MCH RBC QN AUTO: 32.8 PG (ref 27–31)
MCHC RBC AUTO-ENTMCNC: 34.7 G/DL (ref 33–37)
MCV RBC AUTO: 94.7 FL (ref 80–94)
MONOCYTES ABSOLUTE: 0.7 K/UL (ref 0–0.9)
MONOCYTES RELATIVE PERCENT: 10.3 % (ref 0–10)
NEUTROPHILS ABSOLUTE: 4.6 K/UL (ref 1.5–7.5)
NEUTROPHILS RELATIVE PERCENT: 70 % (ref 50–65)
PDW BLD-RTO: 12.6 % (ref 11.5–14.5)
PLATELET # BLD: 147 K/UL (ref 130–400)
PMV BLD AUTO: 9.2 FL (ref 9.4–12.4)
POTASSIUM SERPL-SCNC: 3.8 MMOL/L (ref 3.5–5)
RBC # BLD: 3.2 M/UL (ref 4.7–6.1)
SODIUM BLD-SCNC: 137 MMOL/L (ref 136–145)
WBC # BLD: 6.5 K/UL (ref 4.8–10.8)

## 2017-07-10 PROCEDURE — 85025 COMPLETE CBC W/AUTO DIFF WBC: CPT

## 2017-07-10 PROCEDURE — 97530 THERAPEUTIC ACTIVITIES: CPT

## 2017-07-10 PROCEDURE — 6360000002 HC RX W HCPCS: Performed by: PSYCHIATRY & NEUROLOGY

## 2017-07-10 PROCEDURE — 97532 HC COGNITIVE THERAPY 15 MIN: CPT

## 2017-07-10 PROCEDURE — 83735 ASSAY OF MAGNESIUM: CPT

## 2017-07-10 PROCEDURE — 2580000003 HC RX 258: Performed by: INTERNAL MEDICINE

## 2017-07-10 PROCEDURE — 97110 THERAPEUTIC EXERCISES: CPT

## 2017-07-10 PROCEDURE — 2580000003 HC RX 258: Performed by: PSYCHIATRY & NEUROLOGY

## 2017-07-10 PROCEDURE — 6360000002 HC RX W HCPCS: Performed by: INTERNAL MEDICINE

## 2017-07-10 PROCEDURE — 97116 GAIT TRAINING THERAPY: CPT

## 2017-07-10 PROCEDURE — 1180000000 HC REHAB R&B

## 2017-07-10 PROCEDURE — 80048 BASIC METABOLIC PNL TOTAL CA: CPT

## 2017-07-10 PROCEDURE — 99232 SBSQ HOSP IP/OBS MODERATE 35: CPT | Performed by: PSYCHIATRY & NEUROLOGY

## 2017-07-10 PROCEDURE — 6360000002 HC RX W HCPCS: Performed by: FAMILY MEDICINE

## 2017-07-10 PROCEDURE — 86757 RICKETTSIA ANTIBODY: CPT

## 2017-07-10 PROCEDURE — 6370000000 HC RX 637 (ALT 250 FOR IP): Performed by: INTERNAL MEDICINE

## 2017-07-10 PROCEDURE — 6370000000 HC RX 637 (ALT 250 FOR IP): Performed by: PSYCHIATRY & NEUROLOGY

## 2017-07-10 RX ORDER — ASPIRIN 81 MG/1
81 TABLET ORAL DAILY
Status: DISCONTINUED | OUTPATIENT
Start: 2017-07-11 | End: 2017-07-11 | Stop reason: HOSPADM

## 2017-07-10 RX ORDER — MAGNESIUM SULFATE IN WATER 40 MG/ML
4 INJECTION, SOLUTION INTRAVENOUS ONCE
Status: COMPLETED | OUTPATIENT
Start: 2017-07-10 | End: 2017-07-10

## 2017-07-10 RX ORDER — 0.9 % SODIUM CHLORIDE 0.9 %
500 INTRAVENOUS SOLUTION INTRAVENOUS ONCE
Status: COMPLETED | OUTPATIENT
Start: 2017-07-10 | End: 2017-07-10

## 2017-07-10 RX ORDER — ASPIRIN 81 MG/1
81 TABLET ORAL ONCE
Status: COMPLETED | OUTPATIENT
Start: 2017-07-10 | End: 2017-07-10

## 2017-07-10 RX ADMIN — DEXTROSE MONOHYDRATE: 50 INJECTION, SOLUTION INTRAVENOUS at 00:21

## 2017-07-10 RX ADMIN — POTASSIUM CHLORIDE 20 MEQ: 20 TABLET, EXTENDED RELEASE ORAL at 20:50

## 2017-07-10 RX ADMIN — POTASSIUM CHLORIDE 20 MEQ: 20 TABLET, EXTENDED RELEASE ORAL at 08:01

## 2017-07-10 RX ADMIN — Medication 800 MG: at 08:01

## 2017-07-10 RX ADMIN — FLUCYTOSINE 1500 MG: 500 CAPSULE ORAL at 00:21

## 2017-07-10 RX ADMIN — FLUCYTOSINE 1500 MG: 500 CAPSULE ORAL at 11:57

## 2017-07-10 RX ADMIN — FLUCYTOSINE 1500 MG: 500 CAPSULE ORAL at 05:57

## 2017-07-10 RX ADMIN — AMPHOTERICIN B 240 MG: 50 INJECTION, POWDER, LYOPHILIZED, FOR SOLUTION INTRAVENOUS at 22:49

## 2017-07-10 RX ADMIN — MAGNESIUM SULFATE IN WATER 4 G: 40 INJECTION, SOLUTION INTRAVENOUS at 10:49

## 2017-07-10 RX ADMIN — LISINOPRIL 20 MG: 20 TABLET ORAL at 08:01

## 2017-07-10 RX ADMIN — PANTOPRAZOLE SODIUM 40 MG: 40 TABLET, DELAYED RELEASE ORAL at 08:01

## 2017-07-10 RX ADMIN — DOCUSATE SODIUM 100 MG: 100 CAPSULE, LIQUID FILLED ORAL at 20:51

## 2017-07-10 RX ADMIN — FLUCYTOSINE 1500 MG: 500 CAPSULE ORAL at 17:31

## 2017-07-10 RX ADMIN — SODIUM CHLORIDE 500 ML: 9 INJECTION, SOLUTION INTRAVENOUS at 09:54

## 2017-07-10 RX ADMIN — DIPHENHYDRAMINE HYDROCHLORIDE 6.5 MG: 50 INJECTION, SOLUTION INTRAMUSCULAR; INTRAVENOUS at 21:52

## 2017-07-10 RX ADMIN — DEXTROSE MONOHYDRATE: 50 INJECTION, SOLUTION INTRAVENOUS at 22:29

## 2017-07-10 RX ADMIN — DOCUSATE SODIUM 100 MG: 100 CAPSULE, LIQUID FILLED ORAL at 08:01

## 2017-07-10 RX ADMIN — ATORVASTATIN CALCIUM 40 MG: 40 TABLET, FILM COATED ORAL at 20:50

## 2017-07-10 RX ADMIN — SODIUM CHLORIDE: 9 INJECTION, SOLUTION INTRAVENOUS at 08:07

## 2017-07-10 RX ADMIN — ASPIRIN 81 MG: 81 TABLET, COATED ORAL at 08:01

## 2017-07-10 RX ADMIN — SODIUM CHLORIDE 500 ML: 9 INJECTION, SOLUTION INTRAVENOUS at 00:56

## 2017-07-10 RX ADMIN — SODIUM CHLORIDE 500 ML: 9 INJECTION, SOLUTION INTRAVENOUS at 20:50

## 2017-07-10 ASSESSMENT — ENCOUNTER SYMPTOMS
VOMITING: 0
NAUSEA: 0

## 2017-07-10 ASSESSMENT — PAIN SCALES - GENERAL: PAINLEVEL_OUTOF10: 0

## 2017-07-11 VITALS
HEART RATE: 56 BPM | BODY MASS INDEX: 22.97 KG/M2 | OXYGEN SATURATION: 97 % | DIASTOLIC BLOOD PRESSURE: 90 MMHG | WEIGHT: 179 LBS | HEIGHT: 74 IN | RESPIRATION RATE: 18 BRPM | TEMPERATURE: 97.8 F | SYSTOLIC BLOOD PRESSURE: 164 MMHG

## 2017-07-11 LAB
ANION GAP SERPL CALCULATED.3IONS-SCNC: 13 MMOL/L (ref 7–19)
BUN BLDV-MCNC: 19 MG/DL (ref 8–23)
CALCIUM SERPL-MCNC: 8.2 MG/DL (ref 8.8–10.2)
CHLORIDE BLD-SCNC: 100 MMOL/L (ref 98–111)
CO2: 19 MMOL/L (ref 22–29)
CREAT SERPL-MCNC: 1.4 MG/DL (ref 0.5–1.2)
GFR NON-AFRICAN AMERICAN: 50
GLUCOSE BLD-MCNC: 187 MG/DL (ref 74–109)
MAGNESIUM: 1.7 MG/DL (ref 1.6–2.4)
POTASSIUM SERPL-SCNC: 3.8 MMOL/L (ref 3.5–5)
SODIUM BLD-SCNC: 132 MMOL/L (ref 136–145)

## 2017-07-11 PROCEDURE — 6370000000 HC RX 637 (ALT 250 FOR IP): Performed by: PSYCHIATRY & NEUROLOGY

## 2017-07-11 PROCEDURE — 6370000000 HC RX 637 (ALT 250 FOR IP): Performed by: INTERNAL MEDICINE

## 2017-07-11 PROCEDURE — 99239 HOSP IP/OBS DSCHRG MGMT >30: CPT | Performed by: PSYCHIATRY & NEUROLOGY

## 2017-07-11 PROCEDURE — 80048 BASIC METABOLIC PNL TOTAL CA: CPT

## 2017-07-11 PROCEDURE — 83735 ASSAY OF MAGNESIUM: CPT

## 2017-07-11 PROCEDURE — 2580000003 HC RX 258: Performed by: PSYCHIATRY & NEUROLOGY

## 2017-07-11 RX ADMIN — POTASSIUM CHLORIDE 20 MEQ: 20 TABLET, EXTENDED RELEASE ORAL at 08:47

## 2017-07-11 RX ADMIN — DOCUSATE SODIUM 100 MG: 100 CAPSULE, LIQUID FILLED ORAL at 08:47

## 2017-07-11 RX ADMIN — PANTOPRAZOLE SODIUM 40 MG: 40 TABLET, DELAYED RELEASE ORAL at 08:47

## 2017-07-11 RX ADMIN — Medication 800 MG: at 08:47

## 2017-07-11 RX ADMIN — LISINOPRIL 20 MG: 20 TABLET ORAL at 08:47

## 2017-07-11 RX ADMIN — FLUCYTOSINE 1500 MG: 500 CAPSULE ORAL at 00:00

## 2017-07-11 RX ADMIN — SODIUM CHLORIDE 500 ML: 9 INJECTION, SOLUTION INTRAVENOUS at 02:37

## 2017-07-11 RX ADMIN — DEXTROSE MONOHYDRATE: 50 INJECTION, SOLUTION INTRAVENOUS at 02:27

## 2017-07-11 RX ADMIN — ASPIRIN 81 MG: 81 TABLET, COATED ORAL at 08:47

## 2017-07-13 LAB
ROCKY MOUNTAIN SPOTTED FEVER AB IGM,: NORMAL
ROCKY MOUNTAIN SPOTTED FEVER ANTIBODY IGG: NORMAL

## 2017-07-14 LAB
ANAEROBIC CULTURE: NORMAL
ANION GAP SERPL CALCULATED.3IONS-SCNC: 17 MMOL/L (ref 7–19)
BUN BLDV-MCNC: 29 MG/DL (ref 8–23)
CALCIUM SERPL-MCNC: 9.2 MG/DL (ref 8.8–10.2)
CHLORIDE BLD-SCNC: 100 MMOL/L (ref 98–111)
CO2: 22 MMOL/L (ref 22–29)
CREAT SERPL-MCNC: 1.9 MG/DL (ref 0.5–1.2)
CSF CULTURE: NORMAL
GFR NON-AFRICAN AMERICAN: 35
GLUCOSE BLD-MCNC: 147 MG/DL (ref 74–109)
GRAM STAIN RESULT: NORMAL
MAGNESIUM: 1.7 MG/DL (ref 1.6–2.4)
POTASSIUM SERPL-SCNC: 4.2 MMOL/L (ref 3.5–5)
SODIUM BLD-SCNC: 139 MMOL/L (ref 136–145)

## 2017-07-16 LAB — MISCELLANEOUS LAB TEST ORDER: ABNORMAL

## 2017-07-17 ENCOUNTER — TELEPHONE (OUTPATIENT)
Dept: PRIMARY CARE CLINIC | Age: 72
End: 2017-07-17

## 2017-07-17 LAB
ANION GAP SERPL CALCULATED.3IONS-SCNC: 16 MMOL/L (ref 7–19)
BUN BLDV-MCNC: 24 MG/DL (ref 8–23)
CALCIUM SERPL-MCNC: 8.8 MG/DL (ref 8.8–10.2)
CHLORIDE BLD-SCNC: 100 MMOL/L (ref 98–111)
CO2: 23 MMOL/L (ref 22–29)
CREAT SERPL-MCNC: 1.6 MG/DL (ref 0.5–1.2)
GFR NON-AFRICAN AMERICAN: 43
GLUCOSE BLD-MCNC: 158 MG/DL (ref 74–109)
MAGNESIUM: 1.5 MG/DL (ref 1.6–2.4)
POTASSIUM SERPL-SCNC: 4.7 MMOL/L (ref 3.5–5)
SODIUM BLD-SCNC: 139 MMOL/L (ref 136–145)

## 2017-07-18 ENCOUNTER — OFFICE VISIT (OUTPATIENT)
Dept: PRIMARY CARE CLINIC | Age: 72
End: 2017-07-18
Payer: MEDICARE

## 2017-07-18 VITALS
BODY MASS INDEX: 22.69 KG/M2 | DIASTOLIC BLOOD PRESSURE: 84 MMHG | TEMPERATURE: 98.5 F | OXYGEN SATURATION: 96 % | HEIGHT: 74 IN | WEIGHT: 176.8 LBS | RESPIRATION RATE: 16 BRPM | HEART RATE: 85 BPM | SYSTOLIC BLOOD PRESSURE: 140 MMHG

## 2017-07-18 DIAGNOSIS — Z86.19 H/O CRYPTOCOCCAL MENINGITIS: ICD-10-CM

## 2017-07-18 DIAGNOSIS — R53.83 FATIGUE, UNSPECIFIED TYPE: ICD-10-CM

## 2017-07-18 DIAGNOSIS — A77.0 RMSF (ROCKY MOUNTAIN SPOTTED FEVER): Primary | ICD-10-CM

## 2017-07-18 DIAGNOSIS — I10 ESSENTIAL HYPERTENSION: ICD-10-CM

## 2017-07-18 PROCEDURE — 1036F TOBACCO NON-USER: CPT | Performed by: NURSE PRACTITIONER

## 2017-07-18 PROCEDURE — 99214 OFFICE O/P EST MOD 30 MIN: CPT | Performed by: NURSE PRACTITIONER

## 2017-07-18 PROCEDURE — 4040F PNEUMOC VAC/ADMIN/RCVD: CPT | Performed by: NURSE PRACTITIONER

## 2017-07-18 PROCEDURE — 1123F ACP DISCUSS/DSCN MKR DOCD: CPT | Performed by: NURSE PRACTITIONER

## 2017-07-18 PROCEDURE — G8598 ASA/ANTIPLAT THER USED: HCPCS | Performed by: NURSE PRACTITIONER

## 2017-07-18 PROCEDURE — 1111F DSCHRG MED/CURRENT MED MERGE: CPT | Performed by: NURSE PRACTITIONER

## 2017-07-18 PROCEDURE — 3017F COLORECTAL CA SCREEN DOC REV: CPT | Performed by: NURSE PRACTITIONER

## 2017-07-18 PROCEDURE — G8420 CALC BMI NORM PARAMETERS: HCPCS | Performed by: NURSE PRACTITIONER

## 2017-07-18 PROCEDURE — G8427 DOCREV CUR MEDS BY ELIG CLIN: HCPCS | Performed by: NURSE PRACTITIONER

## 2017-07-18 RX ORDER — FLUCONAZOLE 200 MG/1
200 TABLET ORAL 4 TIMES DAILY
COMMUNITY
End: 2017-07-18 | Stop reason: ALTCHOICE

## 2017-07-20 LAB
ANION GAP SERPL CALCULATED.3IONS-SCNC: 15 MMOL/L (ref 7–19)
BUN BLDV-MCNC: 29 MG/DL (ref 8–23)
CALCIUM SERPL-MCNC: 9.3 MG/DL (ref 8.8–10.2)
CHLORIDE BLD-SCNC: 100 MMOL/L (ref 98–111)
CO2: 23 MMOL/L (ref 22–29)
CREAT SERPL-MCNC: 1.6 MG/DL (ref 0.5–1.2)
GFR NON-AFRICAN AMERICAN: 43
GLUCOSE BLD-MCNC: 102 MG/DL (ref 74–109)
MAGNESIUM: 1.8 MG/DL (ref 1.6–2.4)
POTASSIUM SERPL-SCNC: 4.1 MMOL/L (ref 3.5–5)
SODIUM BLD-SCNC: 138 MMOL/L (ref 136–145)

## 2017-07-21 ENCOUNTER — APPOINTMENT (OUTPATIENT)
Dept: CT IMAGING | Age: 72
DRG: 194 | End: 2017-07-21
Payer: MEDICARE

## 2017-07-21 ENCOUNTER — HOSPITAL ENCOUNTER (INPATIENT)
Age: 72
LOS: 2 days | Discharge: HOME | DRG: 194 | End: 2017-07-26
Attending: HOSPITALIST | Admitting: INTERNAL MEDICINE
Payer: MEDICARE

## 2017-07-21 ENCOUNTER — HOSPITAL ENCOUNTER (EMERGENCY)
Age: 72
Discharge: HOME | DRG: 194 | End: 2017-07-21
Attending: EMERGENCY MEDICINE
Payer: MEDICARE

## 2017-07-21 ENCOUNTER — APPOINTMENT (OUTPATIENT)
Dept: GENERAL RADIOLOGY | Age: 72
DRG: 194 | End: 2017-07-21
Attending: HOSPITALIST
Payer: MEDICARE

## 2017-07-21 VITALS
SYSTOLIC BLOOD PRESSURE: 137 MMHG | BODY MASS INDEX: 22.46 KG/M2 | DIASTOLIC BLOOD PRESSURE: 79 MMHG | HEIGHT: 74 IN | HEART RATE: 72 BPM | TEMPERATURE: 98.1 F | RESPIRATION RATE: 15 BRPM | WEIGHT: 175 LBS | OXYGEN SATURATION: 97 %

## 2017-07-21 DIAGNOSIS — N18.9 CHRONIC RENAL FAILURE, UNSPECIFIED STAGE: Primary | ICD-10-CM

## 2017-07-21 DIAGNOSIS — R10.11 ABDOMINAL PAIN, RIGHT UPPER QUADRANT: ICD-10-CM

## 2017-07-21 DIAGNOSIS — R93.89 ABNORMAL CT SCAN: ICD-10-CM

## 2017-07-21 PROBLEM — K57.92 DIVERTICULITIS: Status: ACTIVE | Noted: 2017-07-21

## 2017-07-21 LAB
ALBUMIN SERPL-MCNC: 3.4 G/DL (ref 3.5–5.2)
ALBUMIN SERPL-MCNC: 3.9 G/DL (ref 3.5–5.2)
ALP BLD-CCNC: 91 U/L (ref 40–130)
ALP BLD-CCNC: 96 U/L (ref 40–130)
ALT SERPL-CCNC: 35 U/L (ref 5–41)
ALT SERPL-CCNC: 44 U/L (ref 5–41)
ANION GAP SERPL CALCULATED.3IONS-SCNC: 13 MMOL/L (ref 7–19)
ANION GAP SERPL CALCULATED.3IONS-SCNC: 16 MMOL/L (ref 7–19)
AST SERPL-CCNC: 28 U/L (ref 5–40)
AST SERPL-CCNC: 38 U/L (ref 5–40)
BACTERIA: NEGATIVE /HPF
BACTERIA: NEGATIVE /HPF
BASOPHILS ABSOLUTE: 0 K/UL (ref 0–0.2)
BASOPHILS ABSOLUTE: 0 K/UL (ref 0–0.2)
BASOPHILS RELATIVE PERCENT: 0.2 % (ref 0–1)
BASOPHILS RELATIVE PERCENT: 0.4 % (ref 0–1)
BILIRUB SERPL-MCNC: 0.5 MG/DL (ref 0.2–1.2)
BILIRUB SERPL-MCNC: 0.7 MG/DL (ref 0.2–1.2)
BILIRUBIN URINE: NEGATIVE
BILIRUBIN URINE: NEGATIVE
BLOOD, URINE: NEGATIVE
BLOOD, URINE: NEGATIVE
BUN BLDV-MCNC: 26 MG/DL (ref 8–23)
BUN BLDV-MCNC: 30 MG/DL (ref 8–23)
CALCIUM SERPL-MCNC: 9.2 MG/DL (ref 8.8–10.2)
CALCIUM SERPL-MCNC: 9.6 MG/DL (ref 8.8–10.2)
CHLORIDE BLD-SCNC: 100 MMOL/L (ref 98–111)
CHLORIDE BLD-SCNC: 100 MMOL/L (ref 98–111)
CHOLESTEROL, TOTAL: 165 MG/DL (ref 160–199)
CLARITY: CLEAR
CLARITY: CLEAR
CO2: 19 MMOL/L (ref 22–29)
CO2: 23 MMOL/L (ref 22–29)
COLOR: YELLOW
COLOR: YELLOW
CREAT SERPL-MCNC: 1.3 MG/DL (ref 0.5–1.2)
CREAT SERPL-MCNC: 1.6 MG/DL (ref 0.5–1.2)
EOSINOPHILS ABSOLUTE: 0 K/UL (ref 0–0.6)
EOSINOPHILS ABSOLUTE: 0 K/UL (ref 0–0.6)
EOSINOPHILS RELATIVE PERCENT: 0.2 % (ref 0–5)
EOSINOPHILS RELATIVE PERCENT: 0.4 % (ref 0–5)
EPITHELIAL CELLS, UA: 1 /HPF (ref 0–5)
EPITHELIAL CELLS, UA: 1 /HPF (ref 0–5)
GFR NON-AFRICAN AMERICAN: 43
GFR NON-AFRICAN AMERICAN: 55
GLUCOSE BLD-MCNC: 122 MG/DL (ref 74–109)
GLUCOSE BLD-MCNC: 133 MG/DL (ref 74–109)
GLUCOSE URINE: NEGATIVE MG/DL
GLUCOSE URINE: NEGATIVE MG/DL
HBA1C MFR BLD: 6.9 %
HCT VFR BLD CALC: 37.9 % (ref 42–52)
HCT VFR BLD CALC: 42 % (ref 42–52)
HDLC SERPL-MCNC: 38 MG/DL (ref 55–121)
HEMOGLOBIN: 12.7 G/DL (ref 14–18)
HEMOGLOBIN: 14 G/DL (ref 14–18)
HYALINE CASTS: 0 /HPF (ref 0–8)
HYALINE CASTS: 2 /HPF (ref 0–8)
INR BLD: 1.24 (ref 0.88–1.18)
KETONES, URINE: NEGATIVE MG/DL
KETONES, URINE: NEGATIVE MG/DL
LACTATE DEHYDROGENASE: 224 U/L (ref 91–215)
LACTIC ACID: 1.3 MG/DL (ref 0.5–1.9)
LACTIC ACID: 1.6 MG/DL (ref 0.5–1.9)
LDL CHOLESTEROL CALCULATED: 84 MG/DL
LEUKOCYTE ESTERASE, URINE: NEGATIVE
LEUKOCYTE ESTERASE, URINE: NEGATIVE
LIPASE: 24 U/L (ref 13–60)
LIPASE: 62 U/L (ref 13–60)
LYMPHOCYTES ABSOLUTE: 1.2 K/UL (ref 1.1–4.5)
LYMPHOCYTES ABSOLUTE: 1.8 K/UL (ref 1.1–4.5)
LYMPHOCYTES RELATIVE PERCENT: 13.3 % (ref 20–40)
LYMPHOCYTES RELATIVE PERCENT: 18.5 % (ref 20–40)
MAGNESIUM: 1.7 MG/DL (ref 1.6–2.4)
MCH RBC QN AUTO: 33 PG (ref 27–31)
MCH RBC QN AUTO: 33.1 PG (ref 27–31)
MCHC RBC AUTO-ENTMCNC: 33.3 G/DL (ref 33–37)
MCHC RBC AUTO-ENTMCNC: 33.5 G/DL (ref 33–37)
MCV RBC AUTO: 98.4 FL (ref 80–94)
MCV RBC AUTO: 99.3 FL (ref 80–94)
MONOCYTES ABSOLUTE: 1.2 K/UL (ref 0–0.9)
MONOCYTES ABSOLUTE: 1.2 K/UL (ref 0–0.9)
MONOCYTES RELATIVE PERCENT: 12.8 % (ref 0–10)
MONOCYTES RELATIVE PERCENT: 13 % (ref 0–10)
NEUTROPHILS ABSOLUTE: 6.3 K/UL (ref 1.5–7.5)
NEUTROPHILS ABSOLUTE: 6.4 K/UL (ref 1.5–7.5)
NEUTROPHILS RELATIVE PERCENT: 65.6 % (ref 50–65)
NEUTROPHILS RELATIVE PERCENT: 71.8 % (ref 50–65)
NITRITE, URINE: NEGATIVE
NITRITE, URINE: NEGATIVE
PDW BLD-RTO: 13.7 % (ref 11.5–14.5)
PDW BLD-RTO: 13.7 % (ref 11.5–14.5)
PERFORMED ON: NORMAL
PH UA: 6.5
PH UA: 7
PHOSPHORUS: 4.2 MG/DL (ref 2.5–4.5)
PLATELET # BLD: 153 K/UL (ref 130–400)
PLATELET # BLD: 179 K/UL (ref 130–400)
PMV BLD AUTO: 9.2 FL (ref 9.4–12.4)
PMV BLD AUTO: 9.3 FL (ref 9.4–12.4)
POC TROPONIN I: 0 NG/ML (ref 0–0.08)
POTASSIUM SERPL-SCNC: 4.7 MMOL/L (ref 3.5–5)
POTASSIUM SERPL-SCNC: 5 MMOL/L (ref 3.5–5)
PROTEIN UA: 30 MG/DL
PROTEIN UA: 30 MG/DL
PROTHROMBIN TIME: 15.6 SEC (ref 12–14.6)
RBC # BLD: 3.85 M/UL (ref 4.7–6.1)
RBC # BLD: 4.23 M/UL (ref 4.7–6.1)
RBC UA: 1 /HPF (ref 0–4)
RBC UA: 1 /HPF (ref 0–4)
SODIUM BLD-SCNC: 135 MMOL/L (ref 136–145)
SODIUM BLD-SCNC: 136 MMOL/L (ref 136–145)
SPECIFIC GRAVITY UA: 1.01
SPECIFIC GRAVITY UA: 1.03
TOTAL CK: 34 U/L (ref 39–308)
TOTAL PROTEIN: 6.5 G/DL (ref 6.6–8.7)
TOTAL PROTEIN: 7.5 G/DL (ref 6.6–8.7)
TRIGL SERPL-MCNC: 215 MG/DL (ref 150–199)
TROPONIN: <0.01 NG/ML (ref 0–0.03)
UROBILINOGEN, URINE: 0.2 E.U./DL
UROBILINOGEN, URINE: 0.2 E.U./DL
WBC # BLD: 8.9 K/UL (ref 4.8–10.8)
WBC # BLD: 9.6 K/UL (ref 4.8–10.8)
WBC UA: 0 /HPF (ref 0–5)
WBC UA: 1 /HPF (ref 0–5)

## 2017-07-21 PROCEDURE — 83605 ASSAY OF LACTIC ACID: CPT

## 2017-07-21 PROCEDURE — 83735 ASSAY OF MAGNESIUM: CPT

## 2017-07-21 PROCEDURE — 74176 CT ABD & PELVIS W/O CONTRAST: CPT

## 2017-07-21 PROCEDURE — 83690 ASSAY OF LIPASE: CPT

## 2017-07-21 PROCEDURE — 83036 HEMOGLOBIN GLYCOSYLATED A1C: CPT

## 2017-07-21 PROCEDURE — 96365 THER/PROPH/DIAG IV INF INIT: CPT

## 2017-07-21 PROCEDURE — 2580000003 HC RX 258: Performed by: INTERNAL MEDICINE

## 2017-07-21 PROCEDURE — 93005 ELECTROCARDIOGRAM TRACING: CPT

## 2017-07-21 PROCEDURE — 84100 ASSAY OF PHOSPHORUS: CPT

## 2017-07-21 PROCEDURE — 85610 PROTHROMBIN TIME: CPT

## 2017-07-21 PROCEDURE — 80061 LIPID PANEL: CPT

## 2017-07-21 PROCEDURE — 85025 COMPLETE CBC W/AUTO DIFF WBC: CPT

## 2017-07-21 PROCEDURE — 81001 URINALYSIS AUTO W/SCOPE: CPT

## 2017-07-21 PROCEDURE — 87086 URINE CULTURE/COLONY COUNT: CPT

## 2017-07-21 PROCEDURE — 36415 COLL VENOUS BLD VENIPUNCTURE: CPT

## 2017-07-21 PROCEDURE — 96375 TX/PRO/DX INJ NEW DRUG ADDON: CPT

## 2017-07-21 PROCEDURE — 71010 XR CHEST PORTABLE: CPT

## 2017-07-21 PROCEDURE — 99219 PR INITIAL OBSERVATION CARE/DAY 50 MINUTES: CPT | Performed by: INTERNAL MEDICINE

## 2017-07-21 PROCEDURE — 2700000000 HC OXYGEN THERAPY PER DAY

## 2017-07-21 PROCEDURE — 6370000000 HC RX 637 (ALT 250 FOR IP): Performed by: INTERNAL MEDICINE

## 2017-07-21 PROCEDURE — 96376 TX/PRO/DX INJ SAME DRUG ADON: CPT

## 2017-07-21 PROCEDURE — 82550 ASSAY OF CK (CPK): CPT

## 2017-07-21 PROCEDURE — 83615 LACTATE (LD) (LDH) ENZYME: CPT

## 2017-07-21 PROCEDURE — 80053 COMPREHEN METABOLIC PANEL: CPT

## 2017-07-21 PROCEDURE — G0378 HOSPITAL OBSERVATION PER HR: HCPCS

## 2017-07-21 PROCEDURE — 87040 BLOOD CULTURE FOR BACTERIA: CPT

## 2017-07-21 PROCEDURE — 99284 EMERGENCY DEPT VISIT MOD MDM: CPT | Performed by: EMERGENCY MEDICINE

## 2017-07-21 PROCEDURE — 84484 ASSAY OF TROPONIN QUANT: CPT

## 2017-07-21 PROCEDURE — 99284 EMERGENCY DEPT VISIT MOD MDM: CPT

## 2017-07-21 PROCEDURE — 6360000002 HC RX W HCPCS: Performed by: INTERNAL MEDICINE

## 2017-07-21 RX ORDER — LISINOPRIL 20 MG/1
20 TABLET ORAL DAILY
Status: DISCONTINUED | OUTPATIENT
Start: 2017-07-22 | End: 2017-07-23

## 2017-07-21 RX ORDER — ASPIRIN 81 MG/1
81 TABLET, CHEWABLE ORAL DAILY
Status: DISCONTINUED | OUTPATIENT
Start: 2017-07-22 | End: 2017-07-26 | Stop reason: HOSPADM

## 2017-07-21 RX ORDER — LEVOFLOXACIN 5 MG/ML
500 INJECTION, SOLUTION INTRAVENOUS
Status: DISCONTINUED | OUTPATIENT
Start: 2017-07-21 | End: 2017-07-24

## 2017-07-21 RX ORDER — ACETAMINOPHEN 325 MG/1
650 TABLET ORAL EVERY 6 HOURS PRN
Status: DISCONTINUED | OUTPATIENT
Start: 2017-07-21 | End: 2017-07-26 | Stop reason: HOSPADM

## 2017-07-21 RX ORDER — FLUCONAZOLE 200 MG/1
800 TABLET ORAL DAILY
COMMUNITY
End: 2018-01-05 | Stop reason: ALTCHOICE

## 2017-07-21 RX ORDER — SODIUM CHLORIDE 9 MG/ML
INJECTION, SOLUTION INTRAVENOUS CONTINUOUS
Status: DISCONTINUED | OUTPATIENT
Start: 2017-07-21 | End: 2017-07-25

## 2017-07-21 RX ORDER — MORPHINE SULFATE 4 MG/ML
2 INJECTION, SOLUTION INTRAMUSCULAR; INTRAVENOUS
Status: DISCONTINUED | OUTPATIENT
Start: 2017-07-21 | End: 2017-07-26 | Stop reason: HOSPADM

## 2017-07-21 RX ORDER — HYDRALAZINE HYDROCHLORIDE 20 MG/ML
10 INJECTION INTRAMUSCULAR; INTRAVENOUS EVERY 6 HOURS PRN
Status: DISCONTINUED | OUTPATIENT
Start: 2017-07-21 | End: 2017-07-26 | Stop reason: HOSPADM

## 2017-07-21 RX ORDER — OXYCODONE HYDROCHLORIDE 5 MG/1
5 TABLET ORAL EVERY 4 HOURS PRN
Status: DISCONTINUED | OUTPATIENT
Start: 2017-07-21 | End: 2017-07-26 | Stop reason: HOSPADM

## 2017-07-21 RX ORDER — OXYCODONE HYDROCHLORIDE 5 MG/1
10 TABLET ORAL EVERY 4 HOURS PRN
Status: DISCONTINUED | OUTPATIENT
Start: 2017-07-21 | End: 2017-07-26 | Stop reason: HOSPADM

## 2017-07-21 RX ADMIN — SODIUM CHLORIDE: 9 INJECTION, SOLUTION INTRAVENOUS at 22:11

## 2017-07-21 RX ADMIN — OXYCODONE HYDROCHLORIDE 10 MG: 5 TABLET ORAL at 23:16

## 2017-07-21 RX ADMIN — MORPHINE SULFATE 2 MG: 4 INJECTION, SOLUTION INTRAMUSCULAR; INTRAVENOUS at 21:54

## 2017-07-21 RX ADMIN — LEVOFLOXACIN 500 MG: 5 INJECTION, SOLUTION INTRAVENOUS at 23:12

## 2017-07-21 RX ADMIN — MORPHINE SULFATE 2 MG: 4 INJECTION, SOLUTION INTRAMUSCULAR; INTRAVENOUS at 19:31

## 2017-07-21 ASSESSMENT — PAIN SCALES - GENERAL
PAINLEVEL_OUTOF10: 7
PAINLEVEL_OUTOF10: 10
PAINLEVEL_OUTOF10: 6
PAINLEVEL_OUTOF10: 10

## 2017-07-21 ASSESSMENT — PAIN DESCRIPTION - PAIN TYPE: TYPE: ACUTE PAIN

## 2017-07-21 ASSESSMENT — PAIN DESCRIPTION - LOCATION: LOCATION: ABDOMEN

## 2017-07-21 ASSESSMENT — ENCOUNTER SYMPTOMS
VOMITING: 0
EYE PAIN: 0
SHORTNESS OF BREATH: 0
DIARRHEA: 0
ABDOMINAL PAIN: 1

## 2017-07-22 ENCOUNTER — APPOINTMENT (OUTPATIENT)
Dept: CT IMAGING | Age: 72
DRG: 194 | End: 2017-07-22
Attending: HOSPITALIST
Payer: MEDICARE

## 2017-07-22 ENCOUNTER — APPOINTMENT (OUTPATIENT)
Dept: ULTRASOUND IMAGING | Age: 72
DRG: 194 | End: 2017-07-22
Attending: HOSPITALIST
Payer: MEDICARE

## 2017-07-22 PROBLEM — R07.9 RIGHT-SIDED CHEST PAIN: Status: ACTIVE | Noted: 2017-07-22

## 2017-07-22 PROBLEM — R07.81 CHEST PAIN, PLEURITIC: Status: ACTIVE | Noted: 2017-07-22

## 2017-07-22 PROBLEM — E87.1 HYPONATREMIA: Status: ACTIVE | Noted: 2017-07-22

## 2017-07-22 PROBLEM — R07.1 CHEST PAIN ON RESPIRATION: Status: ACTIVE | Noted: 2017-07-22

## 2017-07-22 PROBLEM — E78.1 HYPERTRIGLYCERIDEMIA: Status: ACTIVE | Noted: 2017-07-22

## 2017-07-22 PROBLEM — Z86.19: Status: ACTIVE | Noted: 2017-07-22

## 2017-07-22 LAB
ALBUMIN SERPL-MCNC: 3.2 G/DL (ref 3.5–5.2)
ALP BLD-CCNC: 91 U/L (ref 40–130)
ALT SERPL-CCNC: 41 U/L (ref 5–41)
ANION GAP SERPL CALCULATED.3IONS-SCNC: 15 MMOL/L (ref 7–19)
AST SERPL-CCNC: 28 U/L (ref 5–40)
BASOPHILS ABSOLUTE: 0 K/UL (ref 0–0.2)
BASOPHILS RELATIVE PERCENT: 0.3 % (ref 0–1)
BILIRUB SERPL-MCNC: 0.8 MG/DL (ref 0.2–1.2)
BUN BLDV-MCNC: 24 MG/DL (ref 8–23)
CALCIUM SERPL-MCNC: 8.9 MG/DL (ref 8.8–10.2)
CHLORIDE BLD-SCNC: 98 MMOL/L (ref 98–111)
CO2: 20 MMOL/L (ref 22–29)
CREAT SERPL-MCNC: 1.3 MG/DL (ref 0.5–1.2)
EOSINOPHILS ABSOLUTE: 0 K/UL (ref 0–0.6)
EOSINOPHILS RELATIVE PERCENT: 0.1 % (ref 0–5)
GFR NON-AFRICAN AMERICAN: 55
GLUCOSE BLD-MCNC: 124 MG/DL (ref 74–109)
GLUCOSE BLD-MCNC: 137 MG/DL (ref 70–99)
HCT VFR BLD CALC: 36.5 % (ref 42–52)
HEMOGLOBIN: 12.2 G/DL (ref 14–18)
INR BLD: 1.3 (ref 0.88–1.18)
LACTIC ACID: 1.4 MG/DL (ref 0.5–1.9)
LYMPHOCYTES ABSOLUTE: 1.2 K/UL (ref 1.1–4.5)
LYMPHOCYTES RELATIVE PERCENT: 12.8 % (ref 20–40)
MAGNESIUM: 1.8 MG/DL (ref 1.6–2.4)
MCH RBC QN AUTO: 33.2 PG (ref 27–31)
MCHC RBC AUTO-ENTMCNC: 33.4 G/DL (ref 33–37)
MCV RBC AUTO: 99.5 FL (ref 80–94)
MONOCYTES ABSOLUTE: 1.3 K/UL (ref 0–0.9)
MONOCYTES RELATIVE PERCENT: 14.1 % (ref 0–10)
NEUTROPHILS ABSOLUTE: 6.8 K/UL (ref 1.5–7.5)
NEUTROPHILS RELATIVE PERCENT: 71.5 % (ref 50–65)
PDW BLD-RTO: 13.7 % (ref 11.5–14.5)
PERFORMED ON: ABNORMAL
PHOSPHORUS: 4 MG/DL (ref 2.5–4.5)
PLATELET # BLD: 168 K/UL (ref 130–400)
PMV BLD AUTO: 9.6 FL (ref 9.4–12.4)
POTASSIUM SERPL-SCNC: 4.7 MMOL/L (ref 3.5–5)
PROTHROMBIN TIME: 16.2 SEC (ref 12–14.6)
RBC # BLD: 3.67 M/UL (ref 4.7–6.1)
SODIUM BLD-SCNC: 133 MMOL/L (ref 136–145)
TOTAL PROTEIN: 6.6 G/DL (ref 6.6–8.7)
TROPONIN: <0.01 NG/ML (ref 0–0.03)
TROPONIN: <0.01 NG/ML (ref 0–0.03)
WBC # BLD: 9.4 K/UL (ref 4.8–10.8)

## 2017-07-22 PROCEDURE — 36415 COLL VENOUS BLD VENIPUNCTURE: CPT

## 2017-07-22 PROCEDURE — 96367 TX/PROPH/DG ADDL SEQ IV INF: CPT

## 2017-07-22 PROCEDURE — 86703 HIV-1/HIV-2 1 RESULT ANTBDY: CPT

## 2017-07-22 PROCEDURE — 82948 REAGENT STRIP/BLOOD GLUCOSE: CPT

## 2017-07-22 PROCEDURE — 80074 ACUTE HEPATITIS PANEL: CPT

## 2017-07-22 PROCEDURE — 84484 ASSAY OF TROPONIN QUANT: CPT

## 2017-07-22 PROCEDURE — 6360000002 HC RX W HCPCS: Performed by: INTERNAL MEDICINE

## 2017-07-22 PROCEDURE — 96376 TX/PRO/DX INJ SAME DRUG ADON: CPT

## 2017-07-22 PROCEDURE — 6370000000 HC RX 637 (ALT 250 FOR IP): Performed by: INTERNAL MEDICINE

## 2017-07-22 PROCEDURE — 2500000003 HC RX 250 WO HCPCS: Performed by: INTERNAL MEDICINE

## 2017-07-22 PROCEDURE — 83605 ASSAY OF LACTIC ACID: CPT

## 2017-07-22 PROCEDURE — 80053 COMPREHEN METABOLIC PANEL: CPT

## 2017-07-22 PROCEDURE — 85610 PROTHROMBIN TIME: CPT

## 2017-07-22 PROCEDURE — 2700000000 HC OXYGEN THERAPY PER DAY

## 2017-07-22 PROCEDURE — 84100 ASSAY OF PHOSPHORUS: CPT

## 2017-07-22 PROCEDURE — 71275 CT ANGIOGRAPHY CHEST: CPT

## 2017-07-22 PROCEDURE — 72128 CT CHEST SPINE W/O DYE: CPT

## 2017-07-22 PROCEDURE — 99226 PR SBSQ OBSERVATION CARE/DAY 35 MINUTES: CPT | Performed by: INTERNAL MEDICINE

## 2017-07-22 PROCEDURE — 94664 DEMO&/EVAL PT USE INHALER: CPT

## 2017-07-22 PROCEDURE — 85025 COMPLETE CBC W/AUTO DIFF WBC: CPT

## 2017-07-22 PROCEDURE — G0378 HOSPITAL OBSERVATION PER HR: HCPCS

## 2017-07-22 PROCEDURE — 2580000003 HC RX 258: Performed by: INTERNAL MEDICINE

## 2017-07-22 PROCEDURE — 96366 THER/PROPH/DIAG IV INF ADDON: CPT

## 2017-07-22 PROCEDURE — 6360000004 HC RX CONTRAST MEDICATION: Performed by: HOSPITALIST

## 2017-07-22 PROCEDURE — 76770 US EXAM ABDO BACK WALL COMP: CPT

## 2017-07-22 PROCEDURE — 83735 ASSAY OF MAGNESIUM: CPT

## 2017-07-22 RX ORDER — KETOROLAC TROMETHAMINE 30 MG/ML
15 INJECTION, SOLUTION INTRAMUSCULAR; INTRAVENOUS EVERY 6 HOURS PRN
Status: DISCONTINUED | OUTPATIENT
Start: 2017-07-22 | End: 2017-07-26 | Stop reason: HOSPADM

## 2017-07-22 RX ADMIN — FLUCONAZOLE 800 MG: 150 TABLET ORAL at 08:36

## 2017-07-22 RX ADMIN — METRONIDAZOLE 500 MG: 500 INJECTION, SOLUTION INTRAVENOUS at 00:15

## 2017-07-22 RX ADMIN — SODIUM CHLORIDE: 9 INJECTION, SOLUTION INTRAVENOUS at 22:39

## 2017-07-22 RX ADMIN — MORPHINE SULFATE 2 MG: 4 INJECTION, SOLUTION INTRAMUSCULAR; INTRAVENOUS at 02:32

## 2017-07-22 RX ADMIN — MORPHINE SULFATE 2 MG: 4 INJECTION, SOLUTION INTRAMUSCULAR; INTRAVENOUS at 10:13

## 2017-07-22 RX ADMIN — OXYCODONE HYDROCHLORIDE 10 MG: 5 TABLET ORAL at 11:31

## 2017-07-22 RX ADMIN — LISINOPRIL 20 MG: 20 TABLET ORAL at 08:36

## 2017-07-22 RX ADMIN — METRONIDAZOLE 500 MG: 500 INJECTION, SOLUTION INTRAVENOUS at 19:25

## 2017-07-22 RX ADMIN — METRONIDAZOLE 500 MG: 500 INJECTION, SOLUTION INTRAVENOUS at 08:42

## 2017-07-22 RX ADMIN — METRONIDAZOLE 500 MG: 500 INJECTION, SOLUTION INTRAVENOUS at 23:48

## 2017-07-22 RX ADMIN — ASPIRIN 81 MG CHEWABLE TABLET 81 MG: 81 TABLET CHEWABLE at 08:36

## 2017-07-22 RX ADMIN — IOVERSOL 90 ML: 741 INJECTION INTRA-ARTERIAL; INTRAVENOUS at 18:43

## 2017-07-22 RX ADMIN — MORPHINE SULFATE 2 MG: 4 INJECTION, SOLUTION INTRAMUSCULAR; INTRAVENOUS at 13:19

## 2017-07-22 RX ADMIN — OXYCODONE HYDROCHLORIDE 10 MG: 5 TABLET ORAL at 22:40

## 2017-07-22 ASSESSMENT — PAIN SCALES - GENERAL
PAINLEVEL_OUTOF10: 6
PAINLEVEL_OUTOF10: 5
PAINLEVEL_OUTOF10: 0
PAINLEVEL_OUTOF10: 8
PAINLEVEL_OUTOF10: 5
PAINLEVEL_OUTOF10: 8

## 2017-07-23 PROBLEM — J18.9 PNEUMONIA OF RIGHT LOWER LOBE DUE TO INFECTIOUS ORGANISM: Status: ACTIVE | Noted: 2017-07-23

## 2017-07-23 PROBLEM — R04.2 HEMOPTYSIS: Status: ACTIVE | Noted: 2017-07-23

## 2017-07-23 LAB
ANION GAP SERPL CALCULATED.3IONS-SCNC: 15 MMOL/L (ref 7–19)
BUN BLDV-MCNC: 20 MG/DL (ref 8–23)
CALCIUM SERPL-MCNC: 9 MG/DL (ref 8.8–10.2)
CHLORIDE BLD-SCNC: 98 MMOL/L (ref 98–111)
CO2: 21 MMOL/L (ref 22–29)
CREAT SERPL-MCNC: 1.3 MG/DL (ref 0.5–1.2)
GFR NON-AFRICAN AMERICAN: 55
GLUCOSE BLD-MCNC: 129 MG/DL (ref 74–109)
GLUCOSE BLD-MCNC: 132 MG/DL (ref 70–99)
HIV-1 AND HIV-2 ANTIBODIES: NEGATIVE
PERFORMED ON: ABNORMAL
POTASSIUM SERPL-SCNC: 4.7 MMOL/L (ref 3.5–5)
SODIUM BLD-SCNC: 134 MMOL/L (ref 136–145)
URINE CULTURE, ROUTINE: NORMAL

## 2017-07-23 PROCEDURE — 6360000002 HC RX W HCPCS: Performed by: INTERNAL MEDICINE

## 2017-07-23 PROCEDURE — G0378 HOSPITAL OBSERVATION PER HR: HCPCS

## 2017-07-23 PROCEDURE — 82948 REAGENT STRIP/BLOOD GLUCOSE: CPT

## 2017-07-23 PROCEDURE — 2700000000 HC OXYGEN THERAPY PER DAY

## 2017-07-23 PROCEDURE — 36415 COLL VENOUS BLD VENIPUNCTURE: CPT

## 2017-07-23 PROCEDURE — 96376 TX/PRO/DX INJ SAME DRUG ADON: CPT

## 2017-07-23 PROCEDURE — 80048 BASIC METABOLIC PNL TOTAL CA: CPT

## 2017-07-23 PROCEDURE — 6370000000 HC RX 637 (ALT 250 FOR IP): Performed by: INTERNAL MEDICINE

## 2017-07-23 PROCEDURE — 96366 THER/PROPH/DIAG IV INF ADDON: CPT

## 2017-07-23 PROCEDURE — 87070 CULTURE OTHR SPECIMN AEROBIC: CPT

## 2017-07-23 PROCEDURE — 99225 PR SBSQ OBSERVATION CARE/DAY 25 MINUTES: CPT | Performed by: INTERNAL MEDICINE

## 2017-07-23 PROCEDURE — 2580000003 HC RX 258: Performed by: INTERNAL MEDICINE

## 2017-07-23 PROCEDURE — 96367 TX/PROPH/DG ADDL SEQ IV INF: CPT

## 2017-07-23 PROCEDURE — 87205 SMEAR GRAM STAIN: CPT

## 2017-07-23 RX ORDER — LISINOPRIL 5 MG/1
5 TABLET ORAL DAILY
Status: DISCONTINUED | OUTPATIENT
Start: 2017-07-24 | End: 2017-07-23

## 2017-07-23 RX ORDER — GUAIFENESIN 600 MG/1
1200 TABLET, EXTENDED RELEASE ORAL 2 TIMES DAILY
Status: DISCONTINUED | OUTPATIENT
Start: 2017-07-23 | End: 2017-07-26 | Stop reason: HOSPADM

## 2017-07-23 RX ORDER — LISINOPRIL 10 MG/1
10 TABLET ORAL DAILY
Status: DISCONTINUED | OUTPATIENT
Start: 2017-07-24 | End: 2017-07-26 | Stop reason: HOSPADM

## 2017-07-23 RX ADMIN — OXYCODONE HYDROCHLORIDE 10 MG: 5 TABLET ORAL at 18:08

## 2017-07-23 RX ADMIN — GUAIFENESIN 1200 MG: 600 TABLET, EXTENDED RELEASE ORAL at 08:15

## 2017-07-23 RX ADMIN — LEVOFLOXACIN 500 MG: 5 INJECTION, SOLUTION INTRAVENOUS at 23:02

## 2017-07-23 RX ADMIN — CEFTRIAXONE SODIUM 1 G: 1 INJECTION, POWDER, FOR SOLUTION INTRAMUSCULAR; INTRAVENOUS at 09:28

## 2017-07-23 RX ADMIN — FLUCONAZOLE 800 MG: 150 TABLET ORAL at 08:08

## 2017-07-23 RX ADMIN — GUAIFENESIN 1200 MG: 600 TABLET, EXTENDED RELEASE ORAL at 20:34

## 2017-07-23 RX ADMIN — ASPIRIN 81 MG CHEWABLE TABLET 81 MG: 81 TABLET CHEWABLE at 08:08

## 2017-07-23 RX ADMIN — MORPHINE SULFATE 2 MG: 4 INJECTION, SOLUTION INTRAMUSCULAR; INTRAVENOUS at 20:37

## 2017-07-23 RX ADMIN — MORPHINE SULFATE 2 MG: 4 INJECTION, SOLUTION INTRAMUSCULAR; INTRAVENOUS at 16:53

## 2017-07-23 ASSESSMENT — PAIN SCALES - GENERAL
PAINLEVEL_OUTOF10: 1
PAINLEVEL_OUTOF10: 10
PAINLEVEL_OUTOF10: 5
PAINLEVEL_OUTOF10: 10

## 2017-07-24 ENCOUNTER — APPOINTMENT (OUTPATIENT)
Dept: MRI IMAGING | Age: 72
DRG: 194 | End: 2017-07-24
Attending: HOSPITALIST
Payer: MEDICARE

## 2017-07-24 PROBLEM — N28.89 RENAL MASS, RIGHT: Status: ACTIVE | Noted: 2017-07-24

## 2017-07-24 LAB
ANION GAP SERPL CALCULATED.3IONS-SCNC: 12 MMOL/L (ref 7–19)
BUN BLDV-MCNC: 18 MG/DL (ref 8–23)
CALCIUM SERPL-MCNC: 9.2 MG/DL (ref 8.8–10.2)
CHLORIDE BLD-SCNC: 98 MMOL/L (ref 98–111)
CO2: 22 MMOL/L (ref 22–29)
CREAT SERPL-MCNC: 1.2 MG/DL (ref 0.5–1.2)
EKG P AXIS: 10 DEGREES
EKG P AXIS: 24 DEGREES
EKG P-R INTERVAL: 162 MS
EKG P-R INTERVAL: 166 MS
EKG Q-T INTERVAL: 382 MS
EKG Q-T INTERVAL: 394 MS
EKG QRS DURATION: 92 MS
EKG QRS DURATION: 98 MS
EKG QTC CALCULATION (BAZETT): 404 MS
EKG QTC CALCULATION (BAZETT): 408 MS
EKG T AXIS: 1 DEGREES
EKG T AXIS: 2 DEGREES
GFR NON-AFRICAN AMERICAN: 60
GLUCOSE BLD-MCNC: 115 MG/DL (ref 70–99)
GLUCOSE BLD-MCNC: 116 MG/DL (ref 74–109)
GLUCOSE BLD-MCNC: 124 MG/DL (ref 70–99)
GLUCOSE BLD-MCNC: 131 MG/DL (ref 70–99)
GLUCOSE BLD-MCNC: 146 MG/DL (ref 70–99)
HAV IGM SER IA-ACNC: NORMAL
HEPATITIS B CORE IGM ANTIBODY: NORMAL
HEPATITIS B SURFACE ANTIGEN INTERPRETATION: NORMAL
HEPATITIS C ANTIBODY INTERPRETATION: NORMAL
PERFORMED ON: ABNORMAL
POTASSIUM SERPL-SCNC: 4.2 MMOL/L (ref 3.5–5)
SODIUM BLD-SCNC: 132 MMOL/L (ref 136–145)

## 2017-07-24 PROCEDURE — 6370000000 HC RX 637 (ALT 250 FOR IP): Performed by: INTERNAL MEDICINE

## 2017-07-24 PROCEDURE — 2580000003 HC RX 258: Performed by: INTERNAL MEDICINE

## 2017-07-24 PROCEDURE — 86612 BLASTOMYCES ANTIBODY: CPT

## 2017-07-24 PROCEDURE — 80048 BASIC METABOLIC PNL TOTAL CA: CPT

## 2017-07-24 PROCEDURE — 6360000004 HC RX CONTRAST MEDICATION: Performed by: INTERNAL MEDICINE

## 2017-07-24 PROCEDURE — A9579 GAD-BASE MR CONTRAST NOS,1ML: HCPCS | Performed by: INTERNAL MEDICINE

## 2017-07-24 PROCEDURE — 1210000000 HC MED SURG R&B

## 2017-07-24 PROCEDURE — 86635 COCCIDIOIDES ANTIBODY: CPT

## 2017-07-24 PROCEDURE — 6360000002 HC RX W HCPCS: Performed by: INTERNAL MEDICINE

## 2017-07-24 PROCEDURE — 86698 HISTOPLASMA ANTIBODY: CPT

## 2017-07-24 PROCEDURE — 86606 ASPERGILLUS ANTIBODY: CPT

## 2017-07-24 PROCEDURE — 36415 COLL VENOUS BLD VENIPUNCTURE: CPT

## 2017-07-24 PROCEDURE — 82948 REAGENT STRIP/BLOOD GLUCOSE: CPT

## 2017-07-24 PROCEDURE — 2700000000 HC OXYGEN THERAPY PER DAY

## 2017-07-24 PROCEDURE — 96366 THER/PROPH/DIAG IV INF ADDON: CPT

## 2017-07-24 PROCEDURE — 74183 MRI ABD W/O CNTR FLWD CNTR: CPT

## 2017-07-24 PROCEDURE — 96376 TX/PRO/DX INJ SAME DRUG ADON: CPT

## 2017-07-24 RX ORDER — LEVOFLOXACIN 500 MG/1
500 TABLET, FILM COATED ORAL NIGHTLY
Status: DISCONTINUED | OUTPATIENT
Start: 2017-07-24 | End: 2017-07-26 | Stop reason: HOSPADM

## 2017-07-24 RX ADMIN — CEFTRIAXONE SODIUM 1 G: 1 INJECTION, POWDER, FOR SOLUTION INTRAMUSCULAR; INTRAVENOUS at 08:31

## 2017-07-24 RX ADMIN — GADOPENTETATE DIMEGLUMINE 17 ML: 469.01 INJECTION INTRAVENOUS at 15:57

## 2017-07-24 RX ADMIN — LISINOPRIL 10 MG: 10 TABLET ORAL at 08:30

## 2017-07-24 RX ADMIN — ASPIRIN 81 MG CHEWABLE TABLET 81 MG: 81 TABLET CHEWABLE at 08:30

## 2017-07-24 RX ADMIN — GUAIFENESIN 1200 MG: 600 TABLET, EXTENDED RELEASE ORAL at 08:29

## 2017-07-24 RX ADMIN — OXYCODONE HYDROCHLORIDE 10 MG: 5 TABLET ORAL at 11:02

## 2017-07-24 RX ADMIN — LEVOFLOXACIN 500 MG: 500 TABLET, FILM COATED ORAL at 21:22

## 2017-07-24 RX ADMIN — GUAIFENESIN 1200 MG: 600 TABLET, EXTENDED RELEASE ORAL at 21:22

## 2017-07-24 RX ADMIN — FLUCONAZOLE 800 MG: 150 TABLET ORAL at 08:29

## 2017-07-24 RX ADMIN — SODIUM CHLORIDE: 9 INJECTION, SOLUTION INTRAVENOUS at 21:22

## 2017-07-24 RX ADMIN — MORPHINE SULFATE 2 MG: 4 INJECTION, SOLUTION INTRAMUSCULAR; INTRAVENOUS at 04:23

## 2017-07-24 RX ADMIN — CEFEPIME 2 G: 2 INJECTION, POWDER, FOR SOLUTION INTRAMUSCULAR; INTRAVENOUS at 18:05

## 2017-07-24 ASSESSMENT — ENCOUNTER SYMPTOMS
SHORTNESS OF BREATH: 0
HEMOPTYSIS: 1
WHEEZING: 0
EYES NEGATIVE: 1
ABDOMINAL PAIN: 1
COUGH: 1
SPUTUM PRODUCTION: 0
VOMITING: 0
NAUSEA: 0

## 2017-07-24 ASSESSMENT — PAIN SCALES - GENERAL
PAINLEVEL_OUTOF10: 6
PAINLEVEL_OUTOF10: 6

## 2017-07-25 LAB
ANION GAP SERPL CALCULATED.3IONS-SCNC: 10 MMOL/L (ref 7–19)
BUN BLDV-MCNC: 21 MG/DL (ref 8–23)
CALCIUM SERPL-MCNC: 9.3 MG/DL (ref 8.8–10.2)
CHLORIDE BLD-SCNC: 100 MMOL/L (ref 98–111)
CO2: 22 MMOL/L (ref 22–29)
CREAT SERPL-MCNC: 1.2 MG/DL (ref 0.5–1.2)
CULTURE, RESPIRATORY: NORMAL
GFR NON-AFRICAN AMERICAN: 60
GLUCOSE BLD-MCNC: 116 MG/DL (ref 70–99)
GLUCOSE BLD-MCNC: 118 MG/DL (ref 74–109)
GLUCOSE BLD-MCNC: 128 MG/DL (ref 70–99)
GLUCOSE BLD-MCNC: 131 MG/DL (ref 70–99)
GLUCOSE BLD-MCNC: 142 MG/DL (ref 70–99)
GRAM STAIN RESULT: NORMAL
HCT VFR BLD CALC: 34.2 % (ref 42–52)
HEMOGLOBIN: 11.6 G/DL (ref 14–18)
MCH RBC QN AUTO: 33.5 PG (ref 27–31)
MCHC RBC AUTO-ENTMCNC: 33.9 G/DL (ref 33–37)
MCV RBC AUTO: 98.8 FL (ref 80–94)
PDW BLD-RTO: 13.4 % (ref 11.5–14.5)
PERFORMED ON: ABNORMAL
PLATELET # BLD: 186 K/UL (ref 130–400)
PMV BLD AUTO: 9.8 FL (ref 9.4–12.4)
POTASSIUM SERPL-SCNC: 4.5 MMOL/L (ref 3.5–5)
PRO-BNP: 108 PG/ML (ref 0–900)
RBC # BLD: 3.46 M/UL (ref 4.7–6.1)
SODIUM BLD-SCNC: 132 MMOL/L (ref 136–145)
WBC # BLD: 6.1 K/UL (ref 4.8–10.8)

## 2017-07-25 PROCEDURE — 83880 ASSAY OF NATRIURETIC PEPTIDE: CPT

## 2017-07-25 PROCEDURE — 80048 BASIC METABOLIC PNL TOTAL CA: CPT

## 2017-07-25 PROCEDURE — 99232 SBSQ HOSP IP/OBS MODERATE 35: CPT | Performed by: HOSPITALIST

## 2017-07-25 PROCEDURE — 85027 COMPLETE CBC AUTOMATED: CPT

## 2017-07-25 PROCEDURE — 2580000003 HC RX 258: Performed by: INTERNAL MEDICINE

## 2017-07-25 PROCEDURE — 82948 REAGENT STRIP/BLOOD GLUCOSE: CPT

## 2017-07-25 PROCEDURE — 6360000002 HC RX W HCPCS: Performed by: INTERNAL MEDICINE

## 2017-07-25 PROCEDURE — 6370000000 HC RX 637 (ALT 250 FOR IP): Performed by: INTERNAL MEDICINE

## 2017-07-25 PROCEDURE — 1210000000 HC MED SURG R&B

## 2017-07-25 PROCEDURE — 2700000000 HC OXYGEN THERAPY PER DAY

## 2017-07-25 PROCEDURE — 36415 COLL VENOUS BLD VENIPUNCTURE: CPT

## 2017-07-25 RX ADMIN — SODIUM CHLORIDE: 9 INJECTION, SOLUTION INTRAVENOUS at 06:09

## 2017-07-25 RX ADMIN — FLUCONAZOLE 800 MG: 150 TABLET ORAL at 08:19

## 2017-07-25 RX ADMIN — LISINOPRIL 10 MG: 10 TABLET ORAL at 08:19

## 2017-07-25 RX ADMIN — CEFEPIME 2 G: 2 INJECTION, POWDER, FOR SOLUTION INTRAMUSCULAR; INTRAVENOUS at 10:28

## 2017-07-25 RX ADMIN — CEFEPIME 2 G: 2 INJECTION, POWDER, FOR SOLUTION INTRAMUSCULAR; INTRAVENOUS at 00:45

## 2017-07-25 RX ADMIN — GUAIFENESIN 1200 MG: 600 TABLET, EXTENDED RELEASE ORAL at 20:25

## 2017-07-25 RX ADMIN — CEFEPIME 2 G: 2 INJECTION, POWDER, FOR SOLUTION INTRAMUSCULAR; INTRAVENOUS at 17:53

## 2017-07-25 RX ADMIN — GUAIFENESIN 1200 MG: 600 TABLET, EXTENDED RELEASE ORAL at 08:19

## 2017-07-25 RX ADMIN — LEVOFLOXACIN 500 MG: 500 TABLET, FILM COATED ORAL at 20:25

## 2017-07-25 RX ADMIN — ASPIRIN 81 MG CHEWABLE TABLET 81 MG: 81 TABLET CHEWABLE at 08:19

## 2017-07-25 ASSESSMENT — ENCOUNTER SYMPTOMS
SHORTNESS OF BREATH: 1
HEMOPTYSIS: 1
DIARRHEA: 0
ABDOMINAL PAIN: 0
NAUSEA: 0
BLURRED VISION: 0
DOUBLE VISION: 0
COUGH: 0
SPUTUM PRODUCTION: 0
VOMITING: 0
CONSTIPATION: 0

## 2017-07-26 VITALS
RESPIRATION RATE: 18 BRPM | HEART RATE: 77 BPM | SYSTOLIC BLOOD PRESSURE: 133 MMHG | OXYGEN SATURATION: 96 % | BODY MASS INDEX: 22.34 KG/M2 | TEMPERATURE: 98.2 F | DIASTOLIC BLOOD PRESSURE: 91 MMHG | WEIGHT: 174 LBS

## 2017-07-26 LAB
EKG P AXIS: 26 DEGREES
EKG P-R INTERVAL: 162 MS
EKG Q-T INTERVAL: 362 MS
EKG QRS DURATION: 98 MS
EKG QTC CALCULATION (BAZETT): 388 MS
EKG T AXIS: 39 DEGREES
GLUCOSE BLD-MCNC: 121 MG/DL (ref 70–99)
GLUCOSE BLD-MCNC: 130 MG/DL (ref 70–99)
GLUCOSE BLD-MCNC: 133 MG/DL (ref 70–99)
PERFORMED ON: ABNORMAL

## 2017-07-26 PROCEDURE — 99239 HOSP IP/OBS DSCHRG MGMT >30: CPT | Performed by: HOSPITALIST

## 2017-07-26 PROCEDURE — 93005 ELECTROCARDIOGRAM TRACING: CPT

## 2017-07-26 PROCEDURE — 6360000002 HC RX W HCPCS: Performed by: INTERNAL MEDICINE

## 2017-07-26 PROCEDURE — 6370000000 HC RX 637 (ALT 250 FOR IP): Performed by: INTERNAL MEDICINE

## 2017-07-26 PROCEDURE — 2580000003 HC RX 258: Performed by: INTERNAL MEDICINE

## 2017-07-26 PROCEDURE — 82948 REAGENT STRIP/BLOOD GLUCOSE: CPT

## 2017-07-26 RX ORDER — GUAIFENESIN 600 MG/1
600 TABLET, EXTENDED RELEASE ORAL 2 TIMES DAILY
Qty: 10 TABLET | Refills: 0 | Status: SHIPPED | OUTPATIENT
Start: 2017-07-26 | End: 2017-07-31

## 2017-07-26 RX ORDER — LEVOFLOXACIN 500 MG/1
500 TABLET, FILM COATED ORAL NIGHTLY
Qty: 7 TABLET | Refills: 0 | Status: SHIPPED | OUTPATIENT
Start: 2017-07-26 | End: 2017-08-02

## 2017-07-26 RX ADMIN — LISINOPRIL 10 MG: 10 TABLET ORAL at 08:21

## 2017-07-26 RX ADMIN — GUAIFENESIN 1200 MG: 600 TABLET, EXTENDED RELEASE ORAL at 08:20

## 2017-07-26 RX ADMIN — CEFEPIME 2 G: 2 INJECTION, POWDER, FOR SOLUTION INTRAMUSCULAR; INTRAVENOUS at 08:20

## 2017-07-26 RX ADMIN — FLUCONAZOLE 800 MG: 150 TABLET ORAL at 08:20

## 2017-07-26 RX ADMIN — CEFEPIME 2 G: 2 INJECTION, POWDER, FOR SOLUTION INTRAMUSCULAR; INTRAVENOUS at 00:04

## 2017-07-26 ASSESSMENT — ENCOUNTER SYMPTOMS
SHORTNESS OF BREATH: 1
HEMOPTYSIS: 0
BLURRED VISION: 0
COUGH: 0
ABDOMINAL PAIN: 0
CONSTIPATION: 0
SPUTUM PRODUCTION: 0
NAUSEA: 0
DIARRHEA: 0
VOMITING: 0
DOUBLE VISION: 0

## 2017-07-27 LAB
BLOOD CULTURE, ROUTINE: NORMAL
CULTURE, BLOOD 2: NORMAL

## 2017-07-28 ENCOUNTER — OFFICE VISIT (OUTPATIENT)
Dept: PRIMARY CARE CLINIC | Age: 72
End: 2017-07-28
Payer: MEDICARE

## 2017-07-28 VITALS
TEMPERATURE: 97.7 F | DIASTOLIC BLOOD PRESSURE: 85 MMHG | OXYGEN SATURATION: 95 % | SYSTOLIC BLOOD PRESSURE: 113 MMHG | HEIGHT: 74 IN | HEART RATE: 93 BPM | RESPIRATION RATE: 16 BRPM | BODY MASS INDEX: 21.84 KG/M2 | WEIGHT: 170.2 LBS

## 2017-07-28 DIAGNOSIS — I10 ESSENTIAL HYPERTENSION: ICD-10-CM

## 2017-07-28 DIAGNOSIS — Z79.899 MEDICATION MANAGEMENT: Primary | ICD-10-CM

## 2017-07-28 LAB
ALBUMIN SERPL-MCNC: 3.3 G/DL (ref 3.5–5.2)
ALP BLD-CCNC: 91 U/L (ref 40–130)
ALT SERPL-CCNC: 26 U/L (ref 5–41)
ANION GAP SERPL CALCULATED.3IONS-SCNC: 20 MMOL/L (ref 7–19)
AST SERPL-CCNC: 26 U/L (ref 5–40)
BILIRUB SERPL-MCNC: 0.4 MG/DL (ref 0.2–1.2)
BUN BLDV-MCNC: 30 MG/DL (ref 8–23)
CALCIUM SERPL-MCNC: 10.3 MG/DL (ref 8.8–10.2)
CHLORIDE BLD-SCNC: 100 MMOL/L (ref 98–111)
CO2: 16 MMOL/L (ref 22–29)
CREAT SERPL-MCNC: 1.7 MG/DL (ref 0.5–1.2)
GFR NON-AFRICAN AMERICAN: 40
GLUCOSE BLD-MCNC: 110 MG/DL (ref 74–109)
MAGNESIUM: 2.2 MG/DL (ref 1.6–2.4)
POTASSIUM SERPL-SCNC: 5 MMOL/L (ref 3.5–5)
SODIUM BLD-SCNC: 136 MMOL/L (ref 136–145)
TOTAL PROTEIN: 7.7 G/DL (ref 6.6–8.7)

## 2017-07-28 PROCEDURE — 99214 OFFICE O/P EST MOD 30 MIN: CPT | Performed by: NURSE PRACTITIONER

## 2017-07-28 PROCEDURE — 1036F TOBACCO NON-USER: CPT | Performed by: NURSE PRACTITIONER

## 2017-07-28 PROCEDURE — 3017F COLORECTAL CA SCREEN DOC REV: CPT | Performed by: NURSE PRACTITIONER

## 2017-07-28 PROCEDURE — 1111F DSCHRG MED/CURRENT MED MERGE: CPT | Performed by: NURSE PRACTITIONER

## 2017-07-28 PROCEDURE — G8598 ASA/ANTIPLAT THER USED: HCPCS | Performed by: NURSE PRACTITIONER

## 2017-07-28 PROCEDURE — G8420 CALC BMI NORM PARAMETERS: HCPCS | Performed by: NURSE PRACTITIONER

## 2017-07-28 PROCEDURE — 36415 COLL VENOUS BLD VENIPUNCTURE: CPT | Performed by: NURSE PRACTITIONER

## 2017-07-28 PROCEDURE — G8427 DOCREV CUR MEDS BY ELIG CLIN: HCPCS | Performed by: NURSE PRACTITIONER

## 2017-07-28 PROCEDURE — 1123F ACP DISCUSS/DSCN MKR DOCD: CPT | Performed by: NURSE PRACTITIONER

## 2017-07-28 PROCEDURE — 4040F PNEUMOC VAC/ADMIN/RCVD: CPT | Performed by: NURSE PRACTITIONER

## 2017-07-28 RX ORDER — DEXLANSOPRAZOLE 60 MG/1
60 CAPSULE, DELAYED RELEASE ORAL DAILY
Qty: 12 CAPSULE | Refills: 0 | OUTPATIENT
Start: 2017-07-28 | End: 2017-08-22

## 2017-07-29 LAB
ASPERGILLUS ANTIBODY ID: NORMAL
BLASTOMYCES ANTIBODY ID: NORMAL
COCCIDIOIDES ANTIBODY ID: NORMAL
HISTOPLASMA ABS, ID: NORMAL

## 2017-08-01 DIAGNOSIS — R94.4 DECREASED GFR: Primary | ICD-10-CM

## 2017-08-01 ASSESSMENT — ENCOUNTER SYMPTOMS
NAUSEA: 0
BLOOD IN STOOL: 0
ABDOMINAL PAIN: 1
DIARRHEA: 0
RECTAL PAIN: 0
CONSTIPATION: 0
VOMITING: 0
ABDOMINAL DISTENTION: 0
ANAL BLEEDING: 0

## 2017-08-03 DIAGNOSIS — R94.4 DECREASED GFR: ICD-10-CM

## 2017-08-04 LAB
ALBUMIN SERPL-MCNC: 3.6 G/DL (ref 3.5–5.2)
ALP BLD-CCNC: 87 U/L (ref 40–130)
ALT SERPL-CCNC: 14 U/L (ref 5–41)
ANION GAP SERPL CALCULATED.3IONS-SCNC: 21 MMOL/L (ref 7–19)
AST SERPL-CCNC: 19 U/L (ref 5–40)
BILIRUB SERPL-MCNC: 0.3 MG/DL (ref 0.2–1.2)
BUN BLDV-MCNC: 45 MG/DL (ref 8–23)
CALCIUM SERPL-MCNC: 10.1 MG/DL (ref 8.8–10.2)
CHLORIDE BLD-SCNC: 100 MMOL/L (ref 98–111)
CO2: 17 MMOL/L (ref 22–29)
CREAT SERPL-MCNC: 2.9 MG/DL (ref 0.5–1.2)
GFR NON-AFRICAN AMERICAN: 22
GLUCOSE BLD-MCNC: 95 MG/DL (ref 74–109)
POTASSIUM SERPL-SCNC: 5.6 MMOL/L (ref 3.5–5)
SODIUM BLD-SCNC: 138 MMOL/L (ref 136–145)
TOTAL PROTEIN: 7.5 G/DL (ref 6.6–8.7)

## 2017-08-07 DIAGNOSIS — N17.9 AKI (ACUTE KIDNEY INJURY) (HCC): Primary | ICD-10-CM

## 2017-08-07 DIAGNOSIS — R94.4 DECREASED GFR: Primary | ICD-10-CM

## 2017-08-07 LAB
ALBUMIN SERPL-MCNC: 3.7 G/DL (ref 3.5–5.2)
ALP BLD-CCNC: 80 U/L (ref 40–130)
ALT SERPL-CCNC: 12 U/L (ref 5–41)
ANION GAP SERPL CALCULATED.3IONS-SCNC: 18 MMOL/L (ref 7–19)
AST SERPL-CCNC: 17 U/L (ref 5–40)
BILIRUB SERPL-MCNC: 0.3 MG/DL (ref 0.2–1.2)
BUN BLDV-MCNC: 29 MG/DL (ref 8–23)
CALCIUM SERPL-MCNC: 10.5 MG/DL (ref 8.8–10.2)
CHLORIDE BLD-SCNC: 103 MMOL/L (ref 98–111)
CO2: 19 MMOL/L (ref 22–29)
CREAT SERPL-MCNC: 1.7 MG/DL (ref 0.5–1.2)
FUNGUS WND CULT: ABNORMAL
GFR NON-AFRICAN AMERICAN: 40
GLUCOSE BLD-MCNC: 100 MG/DL (ref 74–109)
MYCOBACTERIUM SPEC CULT: NORMAL
NIGHT BLUE STAIN TISS: NORMAL
POTASSIUM SERPL-SCNC: 5.7 MMOL/L (ref 3.5–5)
SODIUM BLD-SCNC: 140 MMOL/L (ref 136–145)
TOTAL PROTEIN: 7.3 G/DL (ref 6.6–8.7)

## 2017-08-08 LAB
FUNGUS (MYCOLOGY) CULTURE: NORMAL
KOH PREP: NORMAL

## 2017-08-10 ENCOUNTER — OFFICE VISIT (OUTPATIENT)
Dept: NEUROLOGY | Facility: CLINIC | Age: 72
End: 2017-08-10

## 2017-08-10 VITALS
DIASTOLIC BLOOD PRESSURE: 74 MMHG | WEIGHT: 173 LBS | HEIGHT: 72 IN | HEART RATE: 64 BPM | BODY MASS INDEX: 23.43 KG/M2 | SYSTOLIC BLOOD PRESSURE: 128 MMHG

## 2017-08-10 DIAGNOSIS — I10 ESSENTIAL HYPERTENSION: ICD-10-CM

## 2017-08-10 DIAGNOSIS — B45.9: Primary | ICD-10-CM

## 2017-08-10 DIAGNOSIS — R41.0 DELIRIUM: ICD-10-CM

## 2017-08-10 DIAGNOSIS — I63.513 CEREBROVASCULAR ACCIDENT (CVA) DUE TO BILATERAL OCCLUSION OF MIDDLE CEREBRAL ARTERIES (HCC): ICD-10-CM

## 2017-08-10 PROCEDURE — 99214 OFFICE O/P EST MOD 30 MIN: CPT | Performed by: CLINICAL NURSE SPECIALIST

## 2017-08-10 RX ORDER — FLUCONAZOLE 200 MG/1
200 TABLET ORAL DAILY
COMMUNITY
End: 2018-06-14

## 2017-08-10 NOTE — PROGRESS NOTES
Subjective     Chief Complaint   Patient presents with   • Stroke     HFU- No new stroke like symptoms- retired from [employer]       Harjit Mccoy is a 72 y.o. 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 He is accompanied by his wife and daughter. 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  He had a very interesting course and is described below. He states he has returned to his baseline. He denies HA, light sensitivity, stiff neck. He denies fever/chills. He feels he has returned to his baseline and his family confirms this as well. Short term memory is intact. He states he does remember going to [facility] but does not recall his stay at [facility] and he does remember going to acute rehab. He would like to return to driving. He is continuing on Diflucan and is scheduled to see Dr. Reno next week. He did have a repeat LP at acute rehab and per the patient it was ok.       Initially with the diagnosis of embolic stroke, the plan was to have LINQ implanted but this was aborted when meningitis confirmed.     HPI Comments: Patient was admitted to [facility] mid June 2017 before transfer to [facility] for further neurologic care and LP.    On 6/19/2017 before his admission a small tic was found on his central abdomen.  It was taken off and he was asymptomatic at the time.  2 days later he developed a severe 10 out of 10 frontal headache with slight neck stiffness, fevers, light sensitivity, and some sound sensitivity.  The headache was continuous in its nature.  He had no previous history of headaches.  He was healthy before this.  He was admitted to [facility] where his [place] spotted fever IgG was negative and IgM was positive.  He was started on a prolonged course of doxycycline.  Over the course of his stay they had difficulty with pain control.  Fiorinal  caused extreme sedation and hallucinations.  Steroids caused the same.  The allotted caused somnolence with no benefit and pain relief.  He received his greatest pain relief from Toradol and other NSAIDs.  Over the past 3-4 days after admission he has become increasingly somnolent per the family hence the request to be transferred for further neurologic evaluation.  No seizure activity has been witnessed.  The patient has had no focal weakness or numbness. Upon evaluation at [facility] he wass mildly confused and did complain of a moderate to severe frontal headache.  He was able to be awoken  and followed commands but quickly falls asleep when not stimulated.    MRI brain was done and initially looked as though patient had embolic stroke. However, because he did not improve LP was done and on the same day  there were yeast growing in the CSF.  They were positive for Catarina ink.  Infectious disease was called.  The patient received his initial dose of amphotericin B. He continued to improve and patient was subsequently  transferred to [facility] acute rehab where he spent 10 days before returning home. Unfortunately he developed pneumonia 3 weeks later and was hospitalized yet again at [facility].    Neurologic Problem   The patient's pertinent negatives include no weakness. Primary symptoms comment: HA, cryptococcal meningitis. Chronicity: 6/25/17. The current episode started more than 1 month ago. The neurological problem developed suddenly. The problem has been resolved since onset. Pertinent negatives include no abdominal pain, chest pain, confusion, dizziness, fatigue, fever, headaches, nausea, shortness of breath or vomiting. (Confusion, confusion, AMS) Treatments tried: ABX, diflucan, The treatment provided significant relief. (HTN)        Current Outpatient Prescriptions   Medication Sig Dispense Refill   • fluconazole (DIFLUCAN) 200 MG tablet Take 200 mg by mouth Daily.     • lisinopril (PRINIVIL,ZESTRIL) 20 MG tablet  "Take 20 mg by mouth Daily.       No current facility-administered medications for this visit.        Past Medical History:   Diagnosis Date   • CVA (cerebral vascular accident) 6/26/2017   • Hypertension        Past Surgical History:   Procedure Laterality Date   • CHOLECYSTECTOMY     • TONSILLECTOMY         family history includes Cancer in his father; Heart disease in his mother.    Social History   Substance Use Topics   • Smoking status: Never Smoker   • Smokeless tobacco: Never Used   • Alcohol use No       Review of Systems   Constitutional: Negative.  Negative for fatigue and fever.   HENT: Negative.  Negative for postnasal drip, sneezing and trouble swallowing.    Eyes: Negative.    Respiratory: Negative.  Negative for apnea, choking and shortness of breath.    Cardiovascular: Negative.  Negative for chest pain.   Gastrointestinal: Negative.  Negative for abdominal pain, constipation, diarrhea, nausea and vomiting.   Endocrine: Negative.    Genitourinary: Negative.  Negative for dysuria and frequency.   Musculoskeletal: Negative for arthralgias, gait problem and myalgias.   Skin: Negative.    Allergic/Immunologic: Negative.    Neurological: Positive for tremors. Negative for dizziness, speech difficulty, weakness and headaches.   Hematological: Negative.  Negative for adenopathy.   Psychiatric/Behavioral: Negative.  Negative for agitation, confusion and hallucinations.   All other systems reviewed and are negative.      Objective     /74  Pulse 64  Ht 72\" (182.9 cm)  Wt 173 lb (78.5 kg)  BMI 23.46 kg/m2, Body mass index is 23.46 kg/(m^2).    Physical Exam   Constitutional: He is oriented to person, place, and time. He appears well-developed and well-nourished.   HENT:   Head: Normocephalic and atraumatic.   Right Ear: External ear normal.   Left Ear: External ear normal.   Nose: Nose normal.   Mouth/Throat: Oropharynx is clear and moist.   Eyes: Conjunctivae, EOM and lids are normal. Pupils are " equal, round, and reactive to light.   Neck: Normal range of motion. Neck supple. Carotid bruit is not present.   Cardiovascular: Normal rate, regular rhythm, S1 normal, S2 normal and normal heart sounds.    Pulmonary/Chest: Effort normal and breath sounds normal.   Abdominal: Soft. Bowel sounds are normal.   Musculoskeletal: Normal range of motion.   Neurological: He is alert and oriented to person, place, and time. He has normal strength and normal reflexes. He displays tremor (intention tremor right finger to nose). No cranial nerve deficit or sensory deficit. He exhibits normal muscle tone. He displays a negative Romberg sign. Coordination and gait normal. GCS eye subscore is 4. GCS verbal subscore is 5. GCS motor subscore is 6.   Reflex Scores:       Tricep reflexes are 2+ on the right side and 2+ on the left side.       Bicep reflexes are 2+ on the right side and 2+ on the left side.       Brachioradialis reflexes are 2+ on the right side and 2+ on the left side.       Patellar reflexes are 2+ on the right side and 2+ on the left side.       Achilles reflexes are 2+ on the right side and 2+ on the left side.  Skin: Skin is warm and dry.   Psychiatric: He has a normal mood and affect. His speech is normal and behavior is normal. Cognition and memory are normal.   Nursing note and vitals reviewed.      Results for orders placed or performed during the hospital encounter of 06/24/17   Urine Culture   Result Value Ref Range    Urine Culture No growth at 2 days    Blood Culture With AFIA   Result Value Ref Range    Blood Culture No growth at 5 days    Blood Culture With AFIA   Result Value Ref Range    Blood Culture Abnormal Stain (A)     Blood Culture Cryptococcus neoformans (A)     Isolated from Aerobic Bottle     Gram Stain Result Budding yeast    Culture, CSF   Result Value Ref Range    CSF Culture Moderate growth (3+) Cryptococcus neoformans (A)     Gram Stain Result Many (4+) WBCs seen     Gram Stain Result Few  (2+) Encapsulated yeast seen    HSV 1/2, PCR   Result Value Ref Range    HSV-1 DNA Negative Negative    HSV-2 DNA Negative Negative   Fungus Culture   Result Value Ref Range    Fungus Culture Light growth (2+) Cryptococcus neoformans (A)        Susceptibility    Cryptococcus neoformans - NADIA     Fluconazole 4 No CLSI Guidelines ug/ml   AFB Culture   Result Value Ref Range    AFB Culture No AFB isolated at 6 weeks     AFB Stain No acid fast bacilli seen on direct smear    Catarina Ink   Result Value Ref Range    Catarina Ink Encapsulated yeast seen (A) No encapsulated yeast seen   Cryptococcal AG, CSF   Result Value Ref Range    Cryptococcal Antigen, CSF Positive (C) Negative   Blood Culture ID, PCR   Result Value Ref Range    BCID, PCR Negative by BCID PCR. Culture to Follow. Negative by BCID PCR. Culture to Follow.   Ammonia   Result Value Ref Range    Ammonia <9 (L) 9 - 33 umol/L   Folate   Result Value Ref Range    Folate >20.00 >2.75 ng/mL   T3, Free   Result Value Ref Range    T3, Free 2.25 (L) 2.77 - 5.27 pg/mL   Vitamin B1, Whole Blood   Result Value Ref Range    Vitamin B1, Whole Blood 256.2 (H) 66.5 - 200.0 nmol/L   aPTT   Result Value Ref Range    PTT 30.5 24.1 - 34.8 seconds   Comprehensive Metabolic Panel   Result Value Ref Range    Glucose 105 (H) 70 - 100 mg/dL    BUN 19 5 - 21 mg/dL    Creatinine 1.03 0.50 - 1.40 mg/dL    Sodium 132 (L) 135 - 145 mmol/L    Potassium 4.3 3.5 - 5.3 mmol/L    Chloride 98 98 - 110 mmol/L    CO2 23.0 (L) 24.0 - 31.0 mmol/L    Calcium 9.4 8.4 - 10.4 mg/dL    Total Protein 7.1 6.3 - 8.7 g/dL    Albumin 3.90 3.50 - 5.00 g/dL    ALT (SGPT) 37 0 - 54 U/L    AST (SGOT) 24 7 - 45 U/L    Alkaline Phosphatase 80 24 - 120 U/L    Total Bilirubin 1.5 (H) 0.1 - 1.0 mg/dL    eGFR Non African Amer 72 >60 mL/min/1.73    Globulin 3.2 gm/dL    A/G Ratio 1.2 1.1 - 2.5 g/dL    BUN/Creatinine Ratio 18.4 7.0 - 25.0    Anion Gap 11.0 4.0 - 13.0 mmol/L   Hemoglobin A1c   Result Value Ref Range     Hemoglobin A1C 5.8 %   T4, Free   Result Value Ref Range    Free T4 1.73 0.78 - 2.19 ng/dL   Lactic Acid, Plasma   Result Value Ref Range    Lactate 0.9 0.5 - 2.0 mmol/L   Lipase   Result Value Ref Range    Lipase 97 23 - 203 U/L   Magnesium   Result Value Ref Range    Magnesium 2.1 1.4 - 2.2 mg/dL   Phosphorus   Result Value Ref Range    Phosphorus 3.5 2.5 - 4.5 mg/dL   Protime-INR   Result Value Ref Range    Protime 14.0 11.9 - 14.6 Seconds    INR 1.05 0.91 - 1.09   TSH   Result Value Ref Range    TSH 0.712 0.470 - 4.680 mIU/mL   Urinalysis With / Microscopic If Indicated   Result Value Ref Range    Color, UA Yellow Yellow, Straw    Appearance, UA Clear Clear    pH, UA <=5.0 5.0 - 8.0    Specific Gravity, UA 1.018 1.005 - 1.030    Glucose, UA Negative Negative    Ketones, UA 40 mg/dL (2+) (A) Negative    Bilirubin, UA Negative Negative    Blood, UA Negative Negative    Protein, UA 30 mg/dL (1+) (A) Negative    Leuk Esterase, UA Negative Negative    Nitrite, UA Negative Negative    Urobilinogen, UA 0.2 E.U./dL 0.2 - 1.0 E.U./dL   Amylase   Result Value Ref Range    Amylase 157 (H) 30 - 110 U/L   Ferritin   Result Value Ref Range    Ferritin 286.00 17.90 - 464.00 ng/mL   Iron Profile   Result Value Ref Range    Iron 58 42 - 180 mcg/dL    TIBC 239 225 - 420 mcg/dL    Iron Saturation 24 20 - 45 %   Sedimentation Rate   Result Value Ref Range    Sed Rate 8 0 - 15 mm/hr   C-reactive Protein   Result Value Ref Range    C-Reactive Protein <0.50 0.00 - 0.99 mg/dL   LENI   Result Value Ref Range    LENI Direct Negative Negative   Urinalysis With / Culture If Indicated   Result Value Ref Range    Color, UA Yellow Yellow, Straw    Appearance, UA Clear Clear    pH, UA <=5.0 5.0 - 8.0    Specific Gravity, UA 1.018 1.005 - 1.030    Glucose, UA Negative Negative    Ketones, UA 40 mg/dL (2+) (A) Negative    Bilirubin, UA Negative Negative    Blood, UA Negative Negative    Protein, UA 30 mg/dL (1+) (A) Negative    Leuk Esterase, UA  Negative Negative    Nitrite, UA Negative Negative    Urobilinogen, UA 0.2 E.U./dL 0.2 - 1.0 E.U./dL   [facility] (IgG / M)   Result Value Ref Range    Spotted Fever Group IgG Negative Negative    RMSF IgM 1.65 (H) 0.00 - 0.89 index   West Nile Antibodies, IgG & IgM   Result Value Ref Range    West Nile Virus, IgG Positive (A) Negative    West Nile Virus Antibody, IgM Negative Negative   CBC Auto Differential   Result Value Ref Range    WBC 9.47 4.80 - 10.80 10*3/mm3    RBC 4.96 4.80 - 5.90 10*6/mm3    Hemoglobin 16.4 14.0 - 18.0 g/dL    Hematocrit 45.4 40.0 - 52.0 %    MCV 91.5 82.0 - 95.0 fL    MCH 33.1 (H) 28.0 - 32.0 pg    MCHC 36.1 (H) 33.0 - 36.0 g/dL    RDW 12.3 12.0 - 15.0 %    RDW-SD 41.2 40.0 - 54.0 fl    MPV 10.7 6.0 - 12.0 fL    Platelets 234 130 - 400 10*3/mm3   Urinalysis, Microscopic Only   Result Value Ref Range    RBC, UA 0-2 (A) None Seen /HPF    WBC, UA 0-2 (A) None Seen /HPF    Bacteria, UA None Seen None Seen /HPF    Squamous Epithelial Cells, UA 0-2 None Seen, 0-2 /HPF    Hyaline Casts, UA 3-6 None Seen /LPF    Methodology Automated Microscopy    Blood Gas, Arterial   Result Value Ref Range    Site Arterial: left radial     Shiv's Test      pH, Arterial 7.423 7.350 - 7.450 pH units    pCO2, Arterial 33.8 (L) 35.0 - 45.0 mm Hg    pO2, Arterial 74.5 (L) 80.0 - 100.0 mm Hg    HCO3, Arterial 21.6 (L) 22.0 - 26.0 mmol/L    Base Excess, Arterial -2.0 -2.0 - 2.0 mmol/L    O2 Saturation, Arterial 95.4 94.0 - 100.0 %    O2 Saturation Calculated 95.4 94.0 - 100.0 %    Barometric Pressure for Blood Gas  mmHg    Modality Room air    Vitamin B12   Result Value Ref Range    Vitamin B-12 >1000 (H) 239 - 931 pg/mL   Basic Metabolic Panel   Result Value Ref Range    Glucose 145 (H) 70 - 100 mg/dL    BUN 17 5 - 21 mg/dL    Creatinine 0.89 0.50 - 1.40 mg/dL    Sodium 132 (L) 135 - 145 mmol/L    Potassium 3.8 3.5 - 5.3 mmol/L    Chloride 99 98 - 110 mmol/L    CO2 24.0 24.0 - 31.0 mmol/L    Calcium 8.6 8.4  - 10.4 mg/dL    eGFR Non African Amer 85 >60 mL/min/1.73    BUN/Creatinine Ratio 19.1 7.0 - 25.0    Anion Gap 9.0 4.0 - 13.0 mmol/L   Magnesium   Result Value Ref Range    Magnesium 2.0 1.4 - 2.2 mg/dL   Phosphorus   Result Value Ref Range    Phosphorus 2.8 2.5 - 4.5 mg/dL   CBC Auto Differential   Result Value Ref Range    WBC 8.62 4.80 - 10.80 10*3/mm3    RBC 4.48 (L) 4.80 - 5.90 10*6/mm3    Hemoglobin 14.5 14.0 - 18.0 g/dL    Hematocrit 40.7 40.0 - 52.0 %    MCV 90.8 82.0 - 95.0 fL    MCH 32.4 (H) 28.0 - 32.0 pg    MCHC 35.6 33.0 - 36.0 g/dL    RDW 12.3 12.0 - 15.0 %    RDW-SD 41.1 40.0 - 54.0 fl    MPV 10.6 6.0 - 12.0 fL    Platelets 220 130 - 400 10*3/mm3    Neutrophil % 75.7 39.0 - 78.0 %    Lymphocyte % 12.8 (L) 15.0 - 45.0 %    Monocyte % 10.8 4.0 - 12.0 %    Eosinophil % 0.3 0.0 - 4.0 %    Basophil % 0.1 0.0 - 2.0 %    Immature Grans % 0.3 0.0 - 5.0 %    Neutrophils, Absolute 6.52 1.87 - 8.40 10*3/mm3    Lymphocytes, Absolute 1.10 0.72 - 4.86 10*3/mm3    Monocytes, Absolute 0.93 0.19 - 1.30 10*3/mm3    Eosinophils, Absolute 0.03 0.00 - 0.70 10*3/mm3    Basophils, Absolute 0.01 0.00 - 0.20 10*3/mm3    Immature Grans, Absolute 0.03 0.00 - 0.03 10*3/mm3   LDL Cholesterol, Direct   Result Value Ref Range    LDL Cholesterol  60 0 - 99 mg/dL   Troponin   Result Value Ref Range    Troponin I <0.012 0.000 - 0.034 ng/mL   Basic Metabolic Panel   Result Value Ref Range    Glucose 151 (H) 70 - 100 mg/dL    BUN 14 5 - 21 mg/dL    Creatinine 0.91 0.50 - 1.40 mg/dL    Sodium 129 (L) 135 - 145 mmol/L    Potassium 3.8 3.5 - 5.3 mmol/L    Chloride 96 (L) 98 - 110 mmol/L    CO2 26.0 24.0 - 31.0 mmol/L    Calcium 8.3 (L) 8.4 - 10.4 mg/dL    eGFR Non African Amer 83 >60 mL/min/1.73    BUN/Creatinine Ratio 15.4 7.0 - 25.0    Anion Gap 7.0 4.0 - 13.0 mmol/L   Magnesium   Result Value Ref Range    Magnesium 1.9 1.4 - 2.2 mg/dL   Phosphorus   Result Value Ref Range    Phosphorus 2.9 2.5 - 4.5 mg/dL   Lipid Panel   Result Value Ref  Range    Total Cholesterol 114 (L) 130 - 200 mg/dL    Triglycerides 75 0 - 149 mg/dL    HDL Cholesterol 37 (L) >=40 mg/dL    LDL Cholesterol  52 0 - 99 mg/dL    LDL/HDL Ratio 1.68    TSH   Result Value Ref Range    TSH 1.240 0.470 - 4.680 mIU/mL   CBC Auto Differential   Result Value Ref Range    WBC 8.51 4.80 - 10.80 10*3/mm3    RBC 4.32 (L) 4.80 - 5.90 10*6/mm3    Hemoglobin 14.0 14.0 - 18.0 g/dL    Hematocrit 39.2 (L) 40.0 - 52.0 %    MCV 90.7 82.0 - 95.0 fL    MCH 32.4 (H) 28.0 - 32.0 pg    MCHC 35.7 33.0 - 36.0 g/dL    RDW 12.2 12.0 - 15.0 %    RDW-SD 40.7 40.0 - 54.0 fl    MPV 10.1 6.0 - 12.0 fL    Platelets 209 130 - 400 10*3/mm3    Neutrophil % 74.0 39.0 - 78.0 %    Lymphocyte % 12.7 (L) 15.0 - 45.0 %    Monocyte % 12.3 (H) 4.0 - 12.0 %    Eosinophil % 0.5 0.0 - 4.0 %    Basophil % 0.0 0.0 - 2.0 %    Immature Grans % 0.5 0.0 - 5.0 %    Neutrophils, Absolute 6.30 1.87 - 8.40 10*3/mm3    Lymphocytes, Absolute 1.08 0.72 - 4.86 10*3/mm3    Monocytes, Absolute 1.05 0.19 - 1.30 10*3/mm3    Eosinophils, Absolute 0.04 0.00 - 0.70 10*3/mm3    Basophils, Absolute 0.00 0.00 - 0.20 10*3/mm3    Immature Grans, Absolute 0.04 (H) 0.00 - 0.03 10*3/mm3   Glucose, CSF   Result Value Ref Range    Glucose, CSF 23 (L) 40 - 70 mg/dL   Protein, CSF   Result Value Ref Range    Protein, Total (CSF) 295.0 (H) 12.0 - 60.0 mg/dL   VDRL, CSF   Result Value Ref Range    VDRL, Quantitative, CSF Non Reactive Non Odin:<1:1   Bentley Barr Virus PCR CSF   Result Value Ref Range    Bentley-Barr Virus Real Time Positive (A) Negative   Lyme, Western Blot, CSF   Result Value Ref Range    P93 Ab. IgG CSF Absent     P66 Ab. IgG CSF Absent     P58 Ab. IgG CSF Absent     P45 Ab. IgG CSF Absent     P41 Ab. IgG CSF Present (A)     P39 Ab. IgG  CSF Absent     P30 Ab. IgG CSF Absent     P28 Ab. IgG CSF Absent     P23 Ab. IgG CSF Absent     P18 Ab. IgG Absent     Lyme IgG Western Blot Interpretation Negative     P41 Ab. IgM CSF Absent     P39 Ab. IgM CSF  Absent     P23 Ab. IgM CSF Absent     Lyme IgM Western Blot Interpretation Negative    MS Profile & MBP, CSF   Result Value Ref Range    IgG, CSF 32.6 (H) 0.0 - 8.6 mg/dL    Albumin,  (H) 11 - 48 mg/dL    Albumin 3.5 (L) 3.6 - 4.8 g/dL    IgG 820 700 - 1600 mg/dL    IgG/Alb Ratio, CSF 0.24 0.00 - 0.25    IgG Index, CSF 1.0 (H) 0.0 - 0.7    IgG Synthesis Rate, CSF 90.5 (H) -9.9 TO +3.3 mg/day    Myelin Basic Protein, CSF 5.4 (H) 0.0 - 1.2 ng/mL    Oligoclonal Bands, CSF Comment     CSF/Serum Albumin Index 39 (H) 0 - 8   Varicella Zoster AB, IgM, CSF   Result Value Ref Range    VZV Ab, IgM, CSF 0.00 <=0.90 ISR   Cell Count, CSF   Result Value Ref Range    Color, CSF Xanthrochromic (A) Colorless    Appearance, CSF Clear Clear    RBC, CSF 48 (H) 0 - 0 /mm3    Nucleated Cells,  (H) 0 - 8 /mm3    Volume, CSF 16.0 mL    Tube Number, CSF 1     Method: Hemacytometer Method    Cell Count, CSF   Result Value Ref Range    Color, CSF Xanthrochromic (A) Colorless    Appearance, CSF Clear Clear    RBC, CSF 86 (H) 0 - 0 /mm3    Nucleated Cells, CSF 92 (H) 0 - 8 /mm3    Volume, CSF 16.0 mL    Tube Number, CSF 4    Spinal fluid differential   Result Value Ref Range    Neutrophils, CSF 3 0 - 6 %    Lymphocytes, CSF 67 %    Monocytes, CSF 30 15 - 45 %   Spinal fluid differential   Result Value Ref Range    Neutrophils, CSF 4 0 - 6 %    Lymphocytes, CSF 61 %    Monocytes, CSF 35 15 - 45 %   Magnesium   Result Value Ref Range    Magnesium 2.1 1.4 - 2.2 mg/dL   HIV-1 / O / 2 Ag / Antibody 4th Generation   Result Value Ref Range    HIV SCREEN 4TH GENERATION WRFX (REFERENCE)     Comprehensive Metabolic Panel   Result Value Ref Range    Glucose 147 (H) 70 - 100 mg/dL    BUN 10 5 - 21 mg/dL    Creatinine 0.82 0.50 - 1.40 mg/dL    Sodium 127 (L) 135 - 145 mmol/L    Potassium 4.0 3.5 - 5.3 mmol/L    Chloride 95 (L) 98 - 110 mmol/L    CO2 25.0 24.0 - 31.0 mmol/L    Calcium 8.4 8.4 - 10.4 mg/dL    Total Protein 6.0 (L) 6.3 - 8.7  g/dL    Albumin 3.20 (L) 3.50 - 5.00 g/dL    ALT (SGPT) 38 0 - 54 U/L    AST (SGOT) 35 7 - 45 U/L    Alkaline Phosphatase 63 24 - 120 U/L    Total Bilirubin 1.3 (H) 0.1 - 1.0 mg/dL    eGFR Non African Amer 93 >60 mL/min/1.73    Globulin 2.8 gm/dL    A/G Ratio 1.1 1.1 - 2.5 g/dL    BUN/Creatinine Ratio 12.2 7.0 - 25.0    Anion Gap 7.0 4.0 - 13.0 mmol/L   Magnesium   Result Value Ref Range    Magnesium 2.0 1.4 - 2.2 mg/dL   CBC Auto Differential   Result Value Ref Range    WBC 10.38 4.80 - 10.80 10*3/mm3    RBC 4.28 (L) 4.80 - 5.90 10*6/mm3    Hemoglobin 13.9 (L) 14.0 - 18.0 g/dL    Hematocrit 38.7 (L) 40.0 - 52.0 %    MCV 90.4 82.0 - 95.0 fL    MCH 32.5 (H) 28.0 - 32.0 pg    MCHC 35.9 33.0 - 36.0 g/dL    RDW 12.2 12.0 - 15.0 %    RDW-SD 40.4 40.0 - 54.0 fl    MPV 9.8 6.0 - 12.0 fL    Platelets 190 130 - 400 10*3/mm3    Neutrophil % 84.7 (H) 39.0 - 78.0 %    Lymphocyte % 7.6 (L) 15.0 - 45.0 %    Monocyte % 6.6 4.0 - 12.0 %    Eosinophil % 0.0 0.0 - 4.0 %    Basophil % 0.1 0.0 - 2.0 %    Immature Grans % 1.0 0.0 - 5.0 %    Neutrophils, Absolute 8.80 (H) 1.87 - 8.40 10*3/mm3    Lymphocytes, Absolute 0.79 0.72 - 4.86 10*3/mm3    Monocytes, Absolute 0.68 0.19 - 1.30 10*3/mm3    Eosinophils, Absolute 0.00 0.00 - 0.70 10*3/mm3    Basophils, Absolute 0.01 0.00 - 0.20 10*3/mm3    Immature Grans, Absolute 0.10 (H) 0.00 - 0.03 10*3/mm3   Magnesium   Result Value Ref Range    Magnesium 2.4 (H) 1.4 - 2.2 mg/dL   Basic Metabolic Panel   Result Value Ref Range    Glucose 108 (H) 70 - 100 mg/dL    BUN 13 5 - 21 mg/dL    Creatinine 1.18 0.50 - 1.40 mg/dL    Sodium 136 135 - 145 mmol/L    Potassium 4.2 3.5 - 5.3 mmol/L    Chloride 102 98 - 110 mmol/L    CO2 28.0 24.0 - 31.0 mmol/L    Calcium 9.0 8.4 - 10.4 mg/dL    eGFR Non African Amer 61 >60 mL/min/1.73    BUN/Creatinine Ratio 11.0 7.0 - 25.0    Anion Gap 6.0 4.0 - 13.0 mmol/L   CBC Auto Differential   Result Value Ref Range    WBC 7.74 4.80 - 10.80 10*3/mm3    RBC 4.43 (L) 4.80 -  5.90 10*6/mm3    Hemoglobin 14.2 14.0 - 18.0 g/dL    Hematocrit 41.3 40.0 - 52.0 %    MCV 93.2 82.0 - 95.0 fL    MCH 32.1 (H) 28.0 - 32.0 pg    MCHC 34.4 33.0 - 36.0 g/dL    RDW 12.7 12.0 - 15.0 %    RDW-SD 43.1 40.0 - 54.0 fl    MPV 10.5 6.0 - 12.0 fL    Platelets 204 130 - 400 10*3/mm3    Neutrophil % 68.9 39.0 - 78.0 %    Lymphocyte % 15.1 15.0 - 45.0 %    Monocyte % 14.1 (H) 4.0 - 12.0 %    Eosinophil % 0.9 0.0 - 4.0 %    Basophil % 0.1 0.0 - 2.0 %    Immature Grans % 0.9 0.0 - 5.0 %    Neutrophils, Absolute 5.33 1.87 - 8.40 10*3/mm3    Lymphocytes, Absolute 1.17 0.72 - 4.86 10*3/mm3    Monocytes, Absolute 1.09 0.19 - 1.30 10*3/mm3    Eosinophils, Absolute 0.07 0.00 - 0.70 10*3/mm3    Basophils, Absolute 0.01 0.00 - 0.20 10*3/mm3    Immature Grans, Absolute 0.07 (H) 0.00 - 0.03 10*3/mm3   HIV-1 & HIV-2 Antibodies   Result Value Ref Range    HIV-1/ HIV-2 Negative Negative   Magnesium   Result Value Ref Range    Magnesium 2.1 1.4 - 2.2 mg/dL   Basic Metabolic Panel   Result Value Ref Range    Glucose 108 (H) 70 - 100 mg/dL    BUN 14 5 - 21 mg/dL    Creatinine 1.11 0.50 - 1.40 mg/dL    Sodium 136 135 - 145 mmol/L    Potassium 3.6 3.5 - 5.3 mmol/L    Chloride 106 98 - 110 mmol/L    CO2 23.0 (L) 24.0 - 31.0 mmol/L    Calcium 8.6 8.4 - 10.4 mg/dL    eGFR Non African Amer 65 >60 mL/min/1.73    BUN/Creatinine Ratio 12.6 7.0 - 25.0    Anion Gap 7.0 4.0 - 13.0 mmol/L   CBC Auto Differential   Result Value Ref Range    WBC 7.14 4.80 - 10.80 10*3/mm3    RBC 4.22 (L) 4.80 - 5.90 10*6/mm3    Hemoglobin 13.5 (L) 14.0 - 18.0 g/dL    Hematocrit 39.0 (L) 40.0 - 52.0 %    MCV 92.4 82.0 - 95.0 fL    MCH 32.0 28.0 - 32.0 pg    MCHC 34.6 33.0 - 36.0 g/dL    RDW 12.6 12.0 - 15.0 %    RDW-SD 42.7 40.0 - 54.0 fl    MPV 10.3 6.0 - 12.0 fL    Platelets 202 130 - 400 10*3/mm3    Neutrophil % 70.0 39.0 - 78.0 %    Lymphocyte % 17.9 15.0 - 45.0 %    Monocyte % 10.9 4.0 - 12.0 %    Eosinophil % 0.6 0.0 - 4.0 %    Basophil % 0.3 0.0 - 2.0 %     Immature Grans % 0.3 0.0 - 5.0 %    Neutrophils, Absolute 5.00 1.87 - 8.40 10*3/mm3    Lymphocytes, Absolute 1.28 0.72 - 4.86 10*3/mm3    Monocytes, Absolute 0.78 0.19 - 1.30 10*3/mm3    Eosinophils, Absolute 0.04 0.00 - 0.70 10*3/mm3    Basophils, Absolute 0.02 0.00 - 0.20 10*3/mm3    Immature Grans, Absolute 0.02 0.00 - 0.03 10*3/mm3   Magnesium   Result Value Ref Range    Magnesium 1.7 1.4 - 2.2 mg/dL   Basic Metabolic Panel   Result Value Ref Range    Glucose 108 (H) 70 - 100 mg/dL    BUN 11 5 - 21 mg/dL    Creatinine 1.08 0.50 - 1.40 mg/dL    Sodium 131 (L) 135 - 145 mmol/L    Potassium 3.8 3.5 - 5.3 mmol/L    Chloride 102 98 - 110 mmol/L    CO2 22.0 (L) 24.0 - 31.0 mmol/L    Calcium 8.7 8.4 - 10.4 mg/dL    eGFR Non African Amer 68 >60 mL/min/1.73    BUN/Creatinine Ratio 10.2 7.0 - 25.0    Anion Gap 7.0 4.0 - 13.0 mmol/L   CBC Auto Differential   Result Value Ref Range    WBC 7.76 4.80 - 10.80 10*3/mm3    RBC 4.07 (L) 4.80 - 5.90 10*6/mm3    Hemoglobin 13.1 (L) 14.0 - 18.0 g/dL    Hematocrit 37.2 (L) 40.0 - 52.0 %    MCV 91.4 82.0 - 95.0 fL    MCH 32.2 (H) 28.0 - 32.0 pg    MCHC 35.2 33.0 - 36.0 g/dL    RDW 12.6 12.0 - 15.0 %    RDW-SD 42.1 40.0 - 54.0 fl    MPV 9.9 6.0 - 12.0 fL    Platelets 193 130 - 400 10*3/mm3    Neutrophil % 75.0 39.0 - 78.0 %    Lymphocyte % 14.0 (L) 15.0 - 45.0 %    Monocyte % 10.2 4.0 - 12.0 %    Eosinophil % 0.4 0.0 - 4.0 %    Basophil % 0.1 0.0 - 2.0 %    Immature Grans % 0.3 0.0 - 5.0 %    Neutrophils, Absolute 5.82 1.87 - 8.40 10*3/mm3    Lymphocytes, Absolute 1.09 0.72 - 4.86 10*3/mm3    Monocytes, Absolute 0.79 0.19 - 1.30 10*3/mm3    Eosinophils, Absolute 0.03 0.00 - 0.70 10*3/mm3    Basophils, Absolute 0.01 0.00 - 0.20 10*3/mm3    Immature Grans, Absolute 0.02 0.00 - 0.03 10*3/mm3   Please Note   Result Value Ref Range    Please note Comment    POC Glucose Fingerstick   Result Value Ref Range    Glucose 112 70 - 130 mg/dL   POC Glucose Fingerstick   Result Value Ref Range     Glucose 141 (H) 70 - 130 mg/dL   POC Glucose Fingerstick   Result Value Ref Range    Glucose 117 70 - 130 mg/dL   Adult Transthoracic Echo Complete   Result Value Ref Range    BSA 2.0 m^2    IVSd 1.1 cm    LVIDd 5.0 cm    LVIDs 2.5 cm    LVPWd 1.0 cm    IVS/LVPW 1.1     FS 50.0 %    EDV(Teich) 118.2 ml    ESV(Teich) 22.3 ml    EF(Teich) 81.1 %    EDV(cubed) 125.0 ml    ESV(cubed) 15.6 ml    EF(cubed) 87.5 %    LV mass(C)d 194.4 grams    LV mass(C)dI 96.6 grams/m^2    SV(Teich) 95.9 ml    SI(Teich) 47.7 ml/m^2    SV(cubed) 109.4 ml    SI(cubed) 54.3 ml/m^2    Ao root diam 3.5 cm    Ao root area 9.6 cm^2    LA dimension 2.9 cm    LA/Ao 0.83     LVOT diam 2.4 cm    LVOT area 4.5 cm^2    LVOT area(traced) 4.5 cm^2    Ao root area (BSA corrected) 1.7     MV E max amadou 57.7 cm/sec    MV A max amadou 87.3 cm/sec    MV E/A 0.66     MV dec time 0.31 sec    Ao pk amadou 142.0 cm/sec    Ao max PG 8.1 mmHg    Ao max PG (full) 1.8 mmHg    Ao V2 mean 86.8 cm/sec    Ao mean PG 4.0 mmHg    Ao mean PG (full) 2.0 mmHg    Ao V2 VTI 22.3 cm    TO(I,A) 4.0 cm^2    TO(I,D) 4.0 cm^2    TO(V,A) 4.0 cm^2    TO(V,D) 4.0 cm^2    LV V1 max PG 6.3 mmHg    LV V1 mean PG 2.0 mmHg    LV V1 max 125.0 cm/sec    LV V1 mean 66.1 cm/sec    LV V1 VTI 19.8 cm    SV(Ao) 214.6 ml    SI(Ao) 106.6 ml/m^2    SV(LVOT) 89.6 ml    SI(LVOT) 44.5 ml/m^2    TR max amadou 248.0 cm/sec    RVSP(TR) 29.6 mmHg    RAP systole 5.0 mmHg     CV ECHO ANIBAL - BZI_BMI 23.7 kilograms/m^2     CV ECHO ANIBAL - BSA(HAYCOCK) 2.0 m^2     CV ECHO ANIBAL - BZI_METRIC_WEIGHT 79.4 kg     CV ECHO ANIBAL - BZI_METRIC_HEIGHT 182.9 cm    LA volume 54.6 cm3    E/E' ratio 14.7     LA Volume Index 27.2 mL/m2    Lat Peak E' Amadou 4.9 cm/sec    Med Peak E' Amadou 3.92 cm/sec   Manual Differential   Result Value Ref Range    Neutrophil % 87.0 (H) 39.0 - 78.0 %    Lymphocyte % 6.0 (L) 15.0 - 45.0 %    Monocyte % 5.0 4.0 - 12.0 %    Bands %  1.0 0.0 - 10.0 %    Atypical Lymphocyte % 1.0 0.0 - 5.0 %     Neutrophils Absolute 8.33 1.87 - 8.40 10*3/mm3    Lymphocytes Absolute 0.57 (L) 0.72 - 4.86 10*3/mm3    Monocytes Absolute 0.47 0.19 - 1.30 10*3/mm3    RBC Morphology Normal Normal    WBC Morphology Normal Normal    Platelet Morphology Normal Normal      MRI BRAIN:IMPRESSION:  1. Bilateral foci of restricted diffusion consistent with acute  infarction. This includes involvement of the basal ganglia bilaterally  including the posterior limb of the left internal capsule as well as the  splenium of the corpus callosum in the midline. Given the multiplicity  of the findings a thromboembolic event is suspected. None of these  infarcts are hemorrhagic.  2. Atrophy with moderate small vessel scheming changes involving the  periventricular and higher white matter tracts.  3. No foci of abnormal contrast enhancement are present.           CTA HEAD:IMPRESSION:  1.. Normal CT angiogram of the [place] of [place].  2. Chronic pansinusitis.    CTA NECK;IMPRESSION:  1.. Minimal calcific plaquing involving the right carotid bulb and  proximal ICA with no associated stenosis. Both carotid arteries are  otherwise unremarkable. No evidence of a rate limiting stenosis.  2. Both vertebral arteries are widely patent throughout their course to  the level of the basilar artery. The origin of the great vessels are  unremarkable. There is mild tortuosity of the proximal great vessels.  3. Chronic sinus disease of the frontal sinuses and maxillary sinuses as  described above. No air-fluid levels are present.    CAROTID DUPLEX:IMPRESSION:  Impression:  1. There is less than 50% stenosis of the right internal carotid artery.  2. There is less than 50% stenosis of the left internal carotid artery.  3. Antegrade flow is demonstrated in bilateral vertebral arteries.      2DECHO:Interpretation Summary   · Left ventricular systolic function is normal. Estimated ejection fraction is 61-65%.  · Left ventricular diastolic dysfunction (grade I) consistent  with impaired relaxation.  · Mild mitral and tricuspid valve regurgitation is present.  · There is no evidence of intracardiac mass or thrombus.  · There is no evidence of right to left shunt on bubble study.         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    MEDICAL DECISION MAKIN. I did discuss Mr. Mccoy with Dr. 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 That being said, I think it is reasonable to repeat the MRI brain in 6 months and Dr. Ellis agrees. Patient currently not taking antiplatelet but at this point stroke risk low.   2. Patient has returned to his baseline and denies HA or impaired cognition/memory and family confirms.   3. May return to driving  4. BP managed by PCP  5. Healthy BMI  6. Does not use tobacco     allergies and all known medications/prescriptions have been reviewed using resources available on this encounter.    Return in about 3 months (around 11/10/2017).        Gertrude Mireles, KALI

## 2017-08-14 ENCOUNTER — TELEPHONE (OUTPATIENT)
Dept: PRIMARY CARE CLINIC | Age: 72
End: 2017-08-14

## 2017-08-15 DIAGNOSIS — R94.4 DECREASED GFR: ICD-10-CM

## 2017-08-15 LAB
ALBUMIN SERPL-MCNC: 3.6 G/DL (ref 3.5–5.2)
ALP BLD-CCNC: 76 U/L (ref 40–130)
ALT SERPL-CCNC: 21 U/L (ref 5–41)
ANION GAP SERPL CALCULATED.3IONS-SCNC: 19 MMOL/L (ref 7–19)
AST SERPL-CCNC: 26 U/L (ref 5–40)
BILIRUB SERPL-MCNC: 0.4 MG/DL (ref 0.2–1.2)
BUN BLDV-MCNC: 22 MG/DL (ref 8–23)
CALCIUM SERPL-MCNC: 9.8 MG/DL (ref 8.8–10.2)
CHLORIDE BLD-SCNC: 102 MMOL/L (ref 98–111)
CO2: 19 MMOL/L (ref 22–29)
CREAT SERPL-MCNC: 1.6 MG/DL (ref 0.5–1.2)
GFR NON-AFRICAN AMERICAN: 43
GLUCOSE BLD-MCNC: 145 MG/DL (ref 74–109)
POTASSIUM SERPL-SCNC: 5 MMOL/L (ref 3.5–5)
SODIUM BLD-SCNC: 140 MMOL/L (ref 136–145)
TOTAL PROTEIN: 6.7 G/DL (ref 6.6–8.7)

## 2017-08-22 ENCOUNTER — OFFICE VISIT (OUTPATIENT)
Dept: PRIMARY CARE CLINIC | Age: 72
End: 2017-08-22
Payer: MEDICARE

## 2017-08-22 VITALS
BODY MASS INDEX: 22.97 KG/M2 | HEIGHT: 74 IN | TEMPERATURE: 98.3 F | RESPIRATION RATE: 16 BRPM | SYSTOLIC BLOOD PRESSURE: 107 MMHG | WEIGHT: 179 LBS | DIASTOLIC BLOOD PRESSURE: 71 MMHG | HEART RATE: 70 BPM

## 2017-08-22 DIAGNOSIS — N28.9 RENAL INSUFFICIENCY: ICD-10-CM

## 2017-08-22 DIAGNOSIS — I10 ESSENTIAL HYPERTENSION: ICD-10-CM

## 2017-08-22 DIAGNOSIS — Z86.19 H/O CRYPTOCOCCAL MENINGITIS: ICD-10-CM

## 2017-08-22 DIAGNOSIS — E87.1 HYPONATREMIA: Primary | ICD-10-CM

## 2017-08-22 DIAGNOSIS — N17.9 ACUTE RENAL FAILURE, UNSPECIFIED ACUTE RENAL FAILURE TYPE (HCC): ICD-10-CM

## 2017-08-22 DIAGNOSIS — N28.89 RENAL MASS, RIGHT: ICD-10-CM

## 2017-08-22 LAB
ALBUMIN SERPL-MCNC: 3.9 G/DL (ref 3.5–5.2)
ALP BLD-CCNC: 87 U/L (ref 40–130)
ALT SERPL-CCNC: 13 U/L (ref 5–41)
ANION GAP SERPL CALCULATED.3IONS-SCNC: 18 MMOL/L (ref 7–19)
AST SERPL-CCNC: 18 U/L (ref 5–40)
BILIRUB SERPL-MCNC: 0.5 MG/DL (ref 0.2–1.2)
BILIRUBIN URINE: NEGATIVE
BLOOD, URINE: NEGATIVE
BUN BLDV-MCNC: 29 MG/DL (ref 8–23)
CALCIUM SERPL-MCNC: 10.1 MG/DL (ref 8.8–10.2)
CHLORIDE BLD-SCNC: 104 MMOL/L (ref 98–111)
CLARITY: CLEAR
CO2: 19 MMOL/L (ref 22–29)
COLOR: YELLOW
CREAT SERPL-MCNC: 1.3 MG/DL (ref 0.5–1.2)
CREATININE URINE: 77.8 MG/DL (ref 4.2–622)
GFR NON-AFRICAN AMERICAN: 55
GLUCOSE BLD-MCNC: 95 MG/DL (ref 74–109)
GLUCOSE URINE: NEGATIVE MG/DL
HCT VFR BLD CALC: 37.3 % (ref 42–52)
HEMOGLOBIN: 12.3 G/DL (ref 14–18)
KETONES, URINE: NEGATIVE MG/DL
LEUKOCYTE ESTERASE, URINE: NEGATIVE
MAGNESIUM: 2.1 MG/DL (ref 1.6–2.4)
MCH RBC QN AUTO: 33.1 PG (ref 27–31)
MCHC RBC AUTO-ENTMCNC: 33 G/DL (ref 33–37)
MCV RBC AUTO: 100.3 FL (ref 80–94)
NITRITE, URINE: NEGATIVE
PARATHYROID HORMONE INTACT: 29.8 PG/ML (ref 15–65)
PDW BLD-RTO: 14.1 % (ref 11.5–14.5)
PH UA: 6
PHOSPHORUS: 4.2 MG/DL (ref 2.5–4.5)
PLATELET # BLD: 215 K/UL (ref 130–400)
PMV BLD AUTO: 11.4 FL (ref 9.4–12.4)
POTASSIUM SERPL-SCNC: 4.2 MMOL/L (ref 3.5–5)
PROTEIN PROTEIN: 24 MG/DL (ref 15–45)
PROTEIN UA: ABNORMAL MG/DL
RBC # BLD: 3.72 M/UL (ref 4.7–6.1)
SODIUM BLD-SCNC: 141 MMOL/L (ref 136–145)
SPECIFIC GRAVITY UA: 1.01
TOTAL PROTEIN: 7.9 G/DL (ref 6.6–8.7)
UROBILINOGEN, URINE: 0.2 E.U./DL
WBC # BLD: 6.4 K/UL (ref 4.8–10.8)

## 2017-08-22 PROCEDURE — G8598 ASA/ANTIPLAT THER USED: HCPCS | Performed by: NURSE PRACTITIONER

## 2017-08-22 PROCEDURE — 1123F ACP DISCUSS/DSCN MKR DOCD: CPT | Performed by: NURSE PRACTITIONER

## 2017-08-22 PROCEDURE — 1036F TOBACCO NON-USER: CPT | Performed by: NURSE PRACTITIONER

## 2017-08-22 PROCEDURE — G8420 CALC BMI NORM PARAMETERS: HCPCS | Performed by: NURSE PRACTITIONER

## 2017-08-22 PROCEDURE — G8427 DOCREV CUR MEDS BY ELIG CLIN: HCPCS | Performed by: NURSE PRACTITIONER

## 2017-08-22 PROCEDURE — 99213 OFFICE O/P EST LOW 20 MIN: CPT | Performed by: NURSE PRACTITIONER

## 2017-08-22 PROCEDURE — 4040F PNEUMOC VAC/ADMIN/RCVD: CPT | Performed by: NURSE PRACTITIONER

## 2017-08-22 PROCEDURE — 36415 COLL VENOUS BLD VENIPUNCTURE: CPT | Performed by: NURSE PRACTITIONER

## 2017-08-22 PROCEDURE — 1111F DSCHRG MED/CURRENT MED MERGE: CPT | Performed by: NURSE PRACTITIONER

## 2017-08-22 PROCEDURE — 3017F COLORECTAL CA SCREEN DOC REV: CPT | Performed by: NURSE PRACTITIONER

## 2017-08-29 ENCOUNTER — TELEPHONE (OUTPATIENT)
Dept: PRIMARY CARE CLINIC | Age: 72
End: 2017-08-29

## 2017-08-30 LAB
ALBUMIN SERPL-MCNC: 3.7 G/DL (ref 3.5–5.2)
ANION GAP SERPL CALCULATED.3IONS-SCNC: 13 MMOL/L (ref 7–19)
BASOPHILS ABSOLUTE: 0 K/UL (ref 0–0.2)
BASOPHILS RELATIVE PERCENT: 0.4 % (ref 0–1)
BILIRUBIN URINE: NEGATIVE
BLOOD, URINE: NEGATIVE
BUN BLDV-MCNC: 23 MG/DL (ref 8–23)
CALCIUM SERPL-MCNC: 9.7 MG/DL (ref 8.8–10.2)
CHLORIDE BLD-SCNC: 103 MMOL/L (ref 98–111)
CLARITY: CLEAR
CO2: 24 MMOL/L (ref 22–29)
COLOR: YELLOW
CREAT SERPL-MCNC: 1.5 MG/DL (ref 0.5–1.2)
CREATININE URINE: 64.1 MG/DL (ref 4.2–622)
EOSINOPHILS ABSOLUTE: 0.1 K/UL (ref 0–0.6)
EOSINOPHILS RELATIVE PERCENT: 2.6 % (ref 0–5)
GFR NON-AFRICAN AMERICAN: 46
GLUCOSE BLD-MCNC: 97 MG/DL (ref 74–109)
GLUCOSE URINE: NEGATIVE MG/DL
HCT VFR BLD CALC: 34.3 % (ref 42–52)
HEMOGLOBIN: 11.4 G/DL (ref 14–18)
KETONES, URINE: NEGATIVE MG/DL
LEUKOCYTE ESTERASE, URINE: NEGATIVE
LYMPHOCYTES ABSOLUTE: 1.5 K/UL (ref 1.1–4.5)
LYMPHOCYTES RELATIVE PERCENT: 32.7 % (ref 20–40)
MCH RBC QN AUTO: 33 PG (ref 27–31)
MCHC RBC AUTO-ENTMCNC: 33.2 G/DL (ref 33–37)
MCV RBC AUTO: 99.4 FL (ref 80–94)
MICROALBUMIN UR-MCNC: 6.3 MG/DL (ref 0–19)
MICROALBUMIN/CREAT UR-RTO: 98.3 MG/G
MONOCYTES ABSOLUTE: 0.5 K/UL (ref 0–0.9)
MONOCYTES RELATIVE PERCENT: 11.6 % (ref 0–10)
NEUTROPHILS ABSOLUTE: 2.4 K/UL (ref 1.5–7.5)
NEUTROPHILS RELATIVE PERCENT: 52 % (ref 50–65)
NITRITE, URINE: NEGATIVE
PARATHYROID HORMONE INTACT: 27.9 PG/ML (ref 15–65)
PDW BLD-RTO: 14.2 % (ref 11.5–14.5)
PH UA: 7
PHOSPHORUS: 3.5 MG/DL (ref 2.5–4.5)
PLATELET # BLD: 184 K/UL (ref 130–400)
PMV BLD AUTO: 11.2 FL (ref 9.4–12.4)
POTASSIUM SERPL-SCNC: 4.3 MMOL/L (ref 3.5–5)
PROTEIN UA: NEGATIVE MG/DL
RBC # BLD: 3.45 M/UL (ref 4.7–6.1)
SODIUM BLD-SCNC: 140 MMOL/L (ref 136–145)
SPECIFIC GRAVITY UA: 1.01
URIC ACID, SERUM: 7.1 MG/DL (ref 3.4–7)
UROBILINOGEN, URINE: 0.2 E.U./DL
WBC # BLD: 4.6 K/UL (ref 4.8–10.8)

## 2017-09-01 LAB
C3 COMPLEMENT: 136 MG/DL (ref 88–201)
C4 COMPLEMENT: 21 MG/DL (ref 10–40)

## 2017-09-02 LAB
ALBUMIN SERPL-MCNC: 3.77 G/DL (ref 3.75–5.01)
ALPHA-1-GLOBULIN: 0.29 G/DL (ref 0.19–0.46)
ALPHA-2-GLOBULIN: 0.98 G/DL (ref 0.48–1.05)
ANA BY IFA: NORMAL
BETA GLOBULIN: 0.77 G/DL (ref 0.48–1.1)
GAMMA GLOBULIN: 0.99 G/DL (ref 0.62–1.51)
PROTEIN ELECTROPHORESIS, SERUM: NORMAL
SPE/IFE INTERPRETATION: NORMAL
TOTAL PROTEIN: 6.8 G/DL (ref 6–8.3)

## 2017-09-11 DIAGNOSIS — R94.4 DECREASED GFR: ICD-10-CM

## 2017-09-11 LAB
ALBUMIN SERPL-MCNC: 3.8 G/DL (ref 3.5–5.2)
ALP BLD-CCNC: 67 U/L (ref 40–130)
ALT SERPL-CCNC: 13 U/L (ref 5–41)
ANION GAP SERPL CALCULATED.3IONS-SCNC: 15 MMOL/L (ref 7–19)
AST SERPL-CCNC: 18 U/L (ref 5–40)
BILIRUB SERPL-MCNC: 0.5 MG/DL (ref 0.2–1.2)
BUN BLDV-MCNC: 21 MG/DL (ref 8–23)
CALCIUM SERPL-MCNC: 9.8 MG/DL (ref 8.8–10.2)
CHLORIDE BLD-SCNC: 105 MMOL/L (ref 98–111)
CO2: 24 MMOL/L (ref 22–29)
CREAT SERPL-MCNC: 1.3 MG/DL (ref 0.5–1.2)
GFR NON-AFRICAN AMERICAN: 55
GLUCOSE BLD-MCNC: 105 MG/DL (ref 74–109)
POTASSIUM SERPL-SCNC: 4.7 MMOL/L (ref 3.5–5)
SODIUM BLD-SCNC: 144 MMOL/L (ref 136–145)
TOTAL PROTEIN: 7 G/DL (ref 6.6–8.7)

## 2017-10-05 ENCOUNTER — OFFICE VISIT (OUTPATIENT)
Dept: PRIMARY CARE CLINIC | Age: 72
End: 2017-10-05
Payer: MEDICARE

## 2017-10-05 VITALS
WEIGHT: 190 LBS | HEART RATE: 58 BPM | OXYGEN SATURATION: 99 % | HEIGHT: 74 IN | BODY MASS INDEX: 24.38 KG/M2 | TEMPERATURE: 97.8 F | SYSTOLIC BLOOD PRESSURE: 150 MMHG | DIASTOLIC BLOOD PRESSURE: 82 MMHG

## 2017-10-05 DIAGNOSIS — N18.30 CKD (CHRONIC KIDNEY DISEASE) STAGE 3, GFR 30-59 ML/MIN (HCC): ICD-10-CM

## 2017-10-05 DIAGNOSIS — I10 ESSENTIAL HYPERTENSION: Primary | ICD-10-CM

## 2017-10-05 LAB
ANION GAP SERPL CALCULATED.3IONS-SCNC: 11 MMOL/L (ref 7–19)
BUN BLDV-MCNC: 16 MG/DL (ref 8–23)
CALCIUM SERPL-MCNC: 9.7 MG/DL (ref 8.8–10.2)
CHLORIDE BLD-SCNC: 102 MMOL/L (ref 98–111)
CO2: 28 MMOL/L (ref 22–29)
CREAT SERPL-MCNC: 1.4 MG/DL (ref 0.5–1.2)
GFR NON-AFRICAN AMERICAN: 50
GLUCOSE BLD-MCNC: 94 MG/DL (ref 74–109)
POTASSIUM SERPL-SCNC: 4.2 MMOL/L (ref 3.5–5)
SODIUM BLD-SCNC: 141 MMOL/L (ref 136–145)

## 2017-10-05 PROCEDURE — 36415 COLL VENOUS BLD VENIPUNCTURE: CPT | Performed by: NURSE PRACTITIONER

## 2017-10-05 PROCEDURE — 99213 OFFICE O/P EST LOW 20 MIN: CPT | Performed by: NURSE PRACTITIONER

## 2017-10-05 PROCEDURE — 1123F ACP DISCUSS/DSCN MKR DOCD: CPT | Performed by: NURSE PRACTITIONER

## 2017-10-05 PROCEDURE — 4040F PNEUMOC VAC/ADMIN/RCVD: CPT | Performed by: NURSE PRACTITIONER

## 2017-10-05 PROCEDURE — G8427 DOCREV CUR MEDS BY ELIG CLIN: HCPCS | Performed by: NURSE PRACTITIONER

## 2017-10-05 PROCEDURE — 1036F TOBACCO NON-USER: CPT | Performed by: NURSE PRACTITIONER

## 2017-10-05 PROCEDURE — G8598 ASA/ANTIPLAT THER USED: HCPCS | Performed by: NURSE PRACTITIONER

## 2017-10-05 PROCEDURE — G8420 CALC BMI NORM PARAMETERS: HCPCS | Performed by: NURSE PRACTITIONER

## 2017-10-05 PROCEDURE — G8484 FLU IMMUNIZE NO ADMIN: HCPCS | Performed by: NURSE PRACTITIONER

## 2017-10-05 PROCEDURE — 3017F COLORECTAL CA SCREEN DOC REV: CPT | Performed by: NURSE PRACTITIONER

## 2017-10-05 NOTE — MR AVS SNAPSHOT
After Visit Summary             Herman Hatch   10/5/2017 9:30 AM   Office Visit    Description:  Male : 1945   Provider:  Michael Kurtz CNP   Department:  [facility]              Your Follow-Up and Future Appointments         Below is a list of your follow-up and future appointments. This may not be a complete list as you may have made appointments directly with providers that we are not aware of or your providers may have made some for you. Please call your providers to confirm appointments. It is important to keep your appointments. Please bring your current insurance card, photo ID, co-pay, and all medication bottles to your appointment. If self-pay, payment is expected at the time of service. Your To-Do List     Future Appointments Provider Department Dept Phone    2018 9:15 AM Michael Kurtz, [facility] [place] [address] [phone]    Please arrive 15 minutes prior to appointment, bring photo ID and insurance card. Follow-Up    Return in about 3 months (around 2018), or if symptoms worsen or fail to improve. Information from Your Visit        Department     Name Address Phone Fax    [id number] [facility] [address] [phone]      You Were Seen for:         Comments    Essential hypertension   [069562]         Vital Signs     Blood Pressure Pulse Temperature Height Weight Oxygen Saturation    150/82 58 97.8 °F (36.6 °C) (Temporal) 6' 2\" (1.88 m) 190 lb (86.2 kg) 99%    Body Mass Index Smoking Status                24.39 kg/m2 Never Smoker          Instructions    Monitor BP every day at least once and record with heart rate. Write it down. Fax them or drop them by to me. Goal is top number under 140 and bottom under 84 if not will increase the lisinopril to 30mg and contact nephrology.             Medications and Orders      Your Current Medications Are fluconazole (DIFLUCAN) 200 MG tablet Take 800 mg by mouth daily    lisinopril (PRINIVIL;ZESTRIL) 20 MG tablet Take 1 tablet by mouth daily    aspirin 81 MG tablet Take 81 mg by mouth daily. Allergies              Demerol Hcl [Meperidine] Anxiety    Dilaudid [Hydromorphone Hcl] Anxiety      We Ordered/Performed the following           Basic Metabolic Panel          Additional Information        Basic Information     Date Of Birth Sex Race Ethnicity Preferred Language Preferred Written Language    1945 Male White Non-/Non  English English      Problem List as of 10/5/2017  Date Reviewed: 7/21/2017                CKD (chronic kidney disease) stage 3, GFR 30-59 ml/min    Renal mass, right    Pneumonia of right lower lobe due to infectious organism    Hemoptysis    Hyponatremia    H/O cryptococcal meningitis    Hypertriglyceridemia    Chest pain, pleuritic    Right-sided chest pain    Anorexia    Drowsiness    Essential hypertension    Hypertension      Immunizations as of 10/5/2017     Name Date    Pneumococcal 13-valent Conjugate (Dinorah Lupillo) 8/29/2016      Preventive Care        Date Due    Pneumococcal Vaccines (two) for all adults aged 72 and over (2 of 2 - PPSV23) 8/29/2017    Yearly Flu Vaccine (1) 9/1/2017    Tetanus Combination Vaccine (1 - Tdap) 7/16/2018 (Originally 6/29/1964)    Cholesterol Screening 7/21/2022    Colonoscopy 5/22/2027            [employer] Signup           [employer] allows you to send messages to your doctor, view your test results, renew your prescriptions, schedule appointments, view visit notes, and more. How Do I Sign Up? 1. In your Internet browser, go to [email or web address]  2. Click on the Sign Up Now link in the Sign In box. You will see the New Member Sign Up page. 3. Enter your Hubskip Access Code exactly as it appears below. You will not need to use this code after youve completed the sign-up process.  If you do not sign up before the expiration date, you must request a new code. Locality Access Code: [email or web address]  Expires: 12/4/2017 10:11 AM    4. Enter your Social Security Number ([id number]) and Date of Birth (mm/dd/yyyy) as indicated and click Submit. You will be taken to the next sign-up page. 5. Create a Locality ID. This will be your Locality login ID and cannot be changed, so think of one that is secure and easy to remember. 6. Create a Locality password. You can change your password at any time. 7. Enter your Password Reset Question and Answer. This can be used at a later time if you forget your password. 8. Enter your e-mail address. You will receive e-mail notification when new information is available in [address] 9. Click Sign Up. You can now view your medical record. Additional Information  If you have questions, please contact the physician practice where you receive care. Remember, Locality is NOT to be used for urgent needs. For medical emergencies, dial 911. For questions regarding your Locality account call [phone]. If you have a clinical question, please call your doctor's office.

## 2017-10-12 RX ORDER — LISINOPRIL 20 MG/1
TABLET ORAL
Qty: 90 TABLET | Refills: 3 | OUTPATIENT
Start: 2017-10-12

## 2017-10-12 RX ORDER — LISINOPRIL 30 MG/1
30 TABLET ORAL DAILY
Qty: 30 TABLET | Refills: 3 | Status: SHIPPED | OUTPATIENT
Start: 2017-10-12 | End: 2018-01-05 | Stop reason: SDUPTHER

## 2017-10-12 NOTE — TELEPHONE ENCOUNTER
He had brought readings of his blood pressure in and they are still too high. We are going to increase his lisinopril to 30 mg and he will watch it.

## 2017-10-16 ENCOUNTER — OFFICE VISIT (OUTPATIENT)
Dept: PRIMARY CARE CLINIC | Age: 72
End: 2017-10-16
Payer: MEDICARE

## 2017-10-16 VITALS
RESPIRATION RATE: 16 BRPM | DIASTOLIC BLOOD PRESSURE: 90 MMHG | HEIGHT: 74 IN | OXYGEN SATURATION: 98 % | SYSTOLIC BLOOD PRESSURE: 160 MMHG | BODY MASS INDEX: 24.59 KG/M2 | HEART RATE: 66 BPM | TEMPERATURE: 97.5 F | WEIGHT: 191.6 LBS

## 2017-10-16 DIAGNOSIS — I10 ESSENTIAL HYPERTENSION: Primary | ICD-10-CM

## 2017-10-16 PROCEDURE — G8427 DOCREV CUR MEDS BY ELIG CLIN: HCPCS | Performed by: NURSE PRACTITIONER

## 2017-10-16 PROCEDURE — 3017F COLORECTAL CA SCREEN DOC REV: CPT | Performed by: NURSE PRACTITIONER

## 2017-10-16 PROCEDURE — 1036F TOBACCO NON-USER: CPT | Performed by: NURSE PRACTITIONER

## 2017-10-16 PROCEDURE — 99213 OFFICE O/P EST LOW 20 MIN: CPT | Performed by: NURSE PRACTITIONER

## 2017-10-16 PROCEDURE — G8420 CALC BMI NORM PARAMETERS: HCPCS | Performed by: NURSE PRACTITIONER

## 2017-10-16 PROCEDURE — G8484 FLU IMMUNIZE NO ADMIN: HCPCS | Performed by: NURSE PRACTITIONER

## 2017-10-16 PROCEDURE — G8598 ASA/ANTIPLAT THER USED: HCPCS | Performed by: NURSE PRACTITIONER

## 2017-10-16 PROCEDURE — 1123F ACP DISCUSS/DSCN MKR DOCD: CPT | Performed by: NURSE PRACTITIONER

## 2017-10-16 PROCEDURE — 81002 URINALYSIS NONAUTO W/O SCOPE: CPT | Performed by: NURSE PRACTITIONER

## 2017-10-16 PROCEDURE — 4040F PNEUMOC VAC/ADMIN/RCVD: CPT | Performed by: NURSE PRACTITIONER

## 2017-10-16 RX ORDER — CLONIDINE HYDROCHLORIDE 0.1 MG/1
TABLET ORAL
Qty: 60 TABLET | Refills: 1 | Status: SHIPPED | OUTPATIENT
Start: 2017-10-16 | End: 2018-05-08 | Stop reason: CLARIF

## 2017-10-16 NOTE — PATIENT INSTRUCTIONS
Continue the lisinopril 30mg in the am   Check bp in am and random  If bp over 150 on top or 92 on bottom take one clonidine, you can do this twice during the day. May make drowsy. Recheck BP in 1 hour after rest and should come down. You can always call us. Send me readings on Thursday or Friday and if still running high and taking clonidine will likely add norvasc 5mg at dinner or bedtime.

## 2017-10-17 NOTE — PROGRESS NOTES
The results are in Pt chart.
No pertinent surgical history. History reviewed. No pertinent family history. Social History   Substance Use Topics    Smoking status: Never Smoker    Smokeless tobacco: Never Used    Alcohol use No      Current Outpatient Prescriptions   Medication Sig Dispense Refill    cloNIDine (CATAPRES) 0.1 MG tablet 1 PO twice a day prn  if BP over 150 on top or 92 on bottom. 60 tablet 1    lisinopril (ZESTRIL) 30 MG tablet Take 1 tablet by mouth daily 30 tablet 3    fluconazole (DIFLUCAN) 200 MG tablet Take 800 mg by mouth daily      aspirin 81 MG tablet Take 81 mg by mouth daily. No current facility-administered medications for this visit. Allergies   Allergen Reactions    Demerol Hcl [Meperidine] Anxiety    Dilaudid [Hydromorphone Hcl] Anxiety       Health Maintenance   Topic Date Due    Pneumococcal low/med risk (2 of 2 - PPSV23) 08/29/2017    Flu vaccine (1) 09/01/2017    DTaP/Tdap/Td vaccine (1 - Tdap) 07/16/2018 (Originally 6/29/1964)    Lipid screen  07/21/2022    Colon cancer screen colonoscopy  05/22/2027    Zostavax vaccine  Addressed    Hepatitis C screen  Completed       Subjective:      Review of Systems   Cardiovascular: Negative for chest pain, palpitations and leg swelling. Hypertension   Neurological: Positive for headaches. Objective:     Physical Exam   Constitutional: He is oriented to person, place, and time. He appears well-developed and well-nourished. HENT:   Head: Normocephalic. Cardiovascular: Normal rate, regular rhythm and normal heart sounds. Pulmonary/Chest: Effort normal and breath sounds normal.   Musculoskeletal: He exhibits no edema. Neurological: He is alert and oriented to person, place, and time. Skin: Skin is warm and dry. Psychiatric: He has a normal mood and affect. His behavior is normal. Judgment and thought content normal.   Nursing note and vitals reviewed.     BP (!) 160/90   Pulse 66   Temp 97.5 °F (36.4 °C) (Temporal)

## 2017-10-24 ENCOUNTER — TELEPHONE (OUTPATIENT)
Dept: PRIMARY CARE CLINIC | Age: 72
End: 2017-10-24

## 2017-10-24 RX ORDER — AMLODIPINE BESYLATE 5 MG/1
5 TABLET ORAL NIGHTLY
Qty: 30 TABLET | Refills: 3 | Status: SHIPPED | OUTPATIENT
Start: 2017-10-24 | End: 2018-01-05 | Stop reason: SDUPTHER

## 2017-10-26 ENCOUNTER — TELEPHONE (OUTPATIENT)
Dept: NEUROLOGY | Facility: CLINIC | Age: 72
End: 2017-10-26

## 2017-11-02 ENCOUNTER — HOSPITAL ENCOUNTER (OUTPATIENT)
Dept: GENERAL RADIOLOGY | Age: 72
Discharge: HOME | End: 2017-11-02
Payer: MEDICARE

## 2017-11-02 VITALS
HEIGHT: 74 IN | BODY MASS INDEX: 24.38 KG/M2 | WEIGHT: 190 LBS | SYSTOLIC BLOOD PRESSURE: 156 MMHG | OXYGEN SATURATION: 97 % | RESPIRATION RATE: 17 BRPM | TEMPERATURE: 98.5 F | DIASTOLIC BLOOD PRESSURE: 87 MMHG | HEART RATE: 70 BPM

## 2017-11-02 DIAGNOSIS — B45.1: ICD-10-CM

## 2017-11-02 LAB
APPEARANCE CSF: CLEAR
APTT: 28.5 SEC (ref 26–36.2)
CLOT EVALUATION CSF: NORMAL
COLOR CSF: COLORLESS
CRYPTOCOCCAL ANTIGEN CSF: POSITIVE
CRYPTOCOCCAL ANTIGEN TITER CSF: ABNORMAL
GLUCOSE, CSF: 59 MG/DL (ref 40–70)
HCT VFR BLD CALC: 42.4 % (ref 42–52)
HEMOGLOBIN: 14 G/DL (ref 14–18)
INR BLD: 1.09 (ref 0.88–1.18)
MCH RBC QN AUTO: 33.3 PG (ref 27–31)
MCHC RBC AUTO-ENTMCNC: 33 G/DL (ref 33–37)
MCV RBC AUTO: 101 FL (ref 80–94)
NO DIFFERENTIAL CSF: NORMAL
PDW BLD-RTO: 12.2 % (ref 11.5–14.5)
PLATELET # BLD: 170 K/UL (ref 130–400)
PMV BLD AUTO: 11 FL (ref 9.4–12.4)
PROTEIN CSF: 109 MG/DL (ref 15–45)
PROTHROMBIN TIME: 14 SEC (ref 12–14.6)
RBC # BLD: 4.2 M/UL (ref 4.7–6.1)
RBC CSF: 1 /CUMM (ref 0–5)
TUBE NUMBER CSF: NORMAL
WBC # BLD: 7.1 K/UL (ref 4.8–10.8)
WBC CSF: 8 /CUMM (ref 0–8)

## 2017-11-02 PROCEDURE — 86788 WEST NILE VIRUS AB IGM: CPT

## 2017-11-02 PROCEDURE — 82945 GLUCOSE OTHER FLUID: CPT

## 2017-11-02 PROCEDURE — 87075 CULTR BACTERIA EXCEPT BLOOD: CPT

## 2017-11-02 PROCEDURE — 85027 COMPLETE CBC AUTOMATED: CPT

## 2017-11-02 PROCEDURE — 86618 LYME DISEASE ANTIBODY: CPT

## 2017-11-02 PROCEDURE — 62270 DX LMBR SPI PNXR: CPT

## 2017-11-02 PROCEDURE — 36415 COLL VENOUS BLD VENIPUNCTURE: CPT

## 2017-11-02 PROCEDURE — 85730 THROMBOPLASTIN TIME PARTIAL: CPT

## 2017-11-02 PROCEDURE — 87205 SMEAR GRAM STAIN: CPT

## 2017-11-02 PROCEDURE — 87070 CULTURE OTHR SPECIMN AEROBIC: CPT

## 2017-11-02 PROCEDURE — 85610 PROTHROMBIN TIME: CPT

## 2017-11-02 PROCEDURE — 89050 BODY FLUID CELL COUNT: CPT

## 2017-11-02 PROCEDURE — 87102 FUNGUS ISOLATION CULTURE: CPT

## 2017-11-02 PROCEDURE — 84157 ASSAY OF PROTEIN OTHER: CPT

## 2017-11-02 PROCEDURE — 87529 HSV DNA AMP PROBE: CPT

## 2017-11-02 PROCEDURE — 86592 SYPHILIS TEST NON-TREP QUAL: CPT

## 2017-11-02 PROCEDURE — 88112 CYTOPATH CELL ENHANCE TECH: CPT

## 2017-11-02 PROCEDURE — 86406 PARTICLE AGGLUT ANTBDY TITR: CPT

## 2017-11-02 PROCEDURE — 86789 WEST NILE VIRUS ANTIBODY: CPT

## 2017-11-02 ASSESSMENT — PAIN - FUNCTIONAL ASSESSMENT
PAIN_FUNCTIONAL_ASSESSMENT: 0-10
PAIN_FUNCTIONAL_ASSESSMENT: 0-10

## 2017-11-02 NOTE — PROGRESS NOTES
Pt in waiting room with wife, Binu Nuñez, pt here for repeat LP for cryptococcal meningitis. Escorted pt and wife to [facility], [address] reviewed and questions answered, pt gowned and labs drawn. Pt states has not had ASA since last Tuesday, all other meds/hx/assessment complete and noted. Will await lab results and notify flouro.

## 2017-11-04 LAB
B BURGDORFERI AB,CSF: 0.23 LIV
WEST NILE IGG, CSF: 0.99 IV
WEST NILE IGM ANTIBODY CSF: 0 IV

## 2017-11-05 LAB
HERPES SIMPLEX VIRUS BY PCR: NOT DETECTED
HSV SOURCE: NORMAL
VDRL CSF SCREEN: NON REACTIVE

## 2017-11-09 LAB
ANAEROBIC CULTURE: NORMAL
CSF CULTURE: NORMAL
GRAM STAIN RESULT: NORMAL

## 2017-11-10 ENCOUNTER — OFFICE VISIT (OUTPATIENT)
Dept: NEUROLOGY | Facility: CLINIC | Age: 72
End: 2017-11-10

## 2017-11-10 VITALS
HEART RATE: 69 BPM | WEIGHT: 198 LBS | OXYGEN SATURATION: 97 % | SYSTOLIC BLOOD PRESSURE: 132 MMHG | HEIGHT: 74 IN | BODY MASS INDEX: 25.41 KG/M2 | DIASTOLIC BLOOD PRESSURE: 88 MMHG

## 2017-11-10 DIAGNOSIS — G03.9 MENINGITIS: Primary | ICD-10-CM

## 2017-11-10 DIAGNOSIS — R51.9 BILATERAL HEADACHES: ICD-10-CM

## 2017-11-10 DIAGNOSIS — I10 ESSENTIAL HYPERTENSION: ICD-10-CM

## 2017-11-10 DIAGNOSIS — I63.513 CEREBROVASCULAR ACCIDENT (CVA) DUE TO BILATERAL OCCLUSION OF MIDDLE CEREBRAL ARTERIES (HCC): ICD-10-CM

## 2017-11-10 PROCEDURE — 99213 OFFICE O/P EST LOW 20 MIN: CPT | Performed by: CLINICAL NURSE SPECIALIST

## 2017-11-10 RX ORDER — AMLODIPINE BESYLATE 5 MG/1
5 TABLET ORAL
COMMUNITY
Start: 2017-10-24

## 2017-11-10 RX ORDER — CLONIDINE HYDROCHLORIDE 0.1 MG/1
TABLET ORAL
COMMUNITY
Start: 2017-10-16 | End: 2018-06-14

## 2017-11-10 RX ORDER — LISINOPRIL 30 MG/1
30 TABLET ORAL
COMMUNITY
Start: 2017-10-12 | End: 2018-10-12

## 2017-11-10 NOTE — PROGRESS NOTES
Subjective     Chief Complaint   Patient presents with   • Follow-up     Patient here to follow up for possible TIA. In the last 3 weeks he has started noticing tingling all over his body that comes on mostly while active.  He does claim to have had the feeling most of this morning.         Harjit Mccoy is a 72 y.o. 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 He is accompanied by his wife and daughter. He is here today for follow up for cryptococcal meningitis but did also carry on the discharge summary embolic stroke. He was last seen 8/2017. He continues to be at his baseline. He did have repeat LP 1 week ago and per patient specimens have been negative. Patient Sees Dr. Reno. He does have a new complaint of paresthesia that can occur from toes and radiate to face and vice versa. Paresthesia may also only be on his trunk. Paresthesia began when he began to take clonidine for elevated BP. He began taking BID and now is taking PRN. His last dose was last PM and woke this morning and has been having intermittent paresthesia. He did not take clonidine Wednesday, 2 days ago, and did not have paresthesia that day or yesterday.     As you recall,  He had a very interesting course and is described below. He states he has returned to his baseline. He denies HA, light sensitivity, stiff neck. He denies fever/chills. He feels he has returned to his baseline and his family confirms this as well. Short term memory is intact. He states he does remember going to [facility] but does not recall his stay at [facility] and he does remember going to acute rehab. He would like to return to driving. He is continuing on Diflucan and is scheduled to see Dr. Reno next week. He did have a repeat LP at acute rehab and per the patient it was ok.         Initially with the diagnosis of embolic stroke, the plan was to have LINQ implanted but this was aborted when meningitis confirmed.       HPI Comments:  Patient was admitted to [facility] mid June 2017 before transfer to [facility] for further neurologic care and LP.    On 6/19/2017 before his admission a small tic was found on his central abdomen.  It was taken off and he was asymptomatic at the time.  2 days later he developed a severe 10 out of 10 frontal headache with slight neck stiffness, fevers, light sensitivity, and some sound sensitivity.  The headache was continuous in its nature.  He had no previous history of headaches.  He was healthy before this.  [facility] was [facility] where his [place] spotted fever IgG was negative and IgM was positive.  He was started on a prolonged course of doxycycline.  Over the course of his stay they had difficulty with pain control.  Fiorinal caused extreme sedation and hallucinations.  Steroids caused the same.  The allotted caused somnolence with no benefit and pain relief.  He received his greatest pain relief from Toradol and other NSAIDs.  Over the past 3-4 days after admission he has become increasingly somnolent per the family hence the request to be transferred for further neurologic evaluation.  No seizure activity has been witnessed.  The patient has had no focal weakness or numbness. Upon evaluation at [facility] he wass mildly confused and did complain of a moderate to severe frontal headache.  He was able to be awoken  and followed commands but quickly falls asleep when not stimulated.    MRI brain was done and initially looked as though patient had embolic stroke. However, because he did not improve LP was done and on the same day  there were yeast growing in the CSF.  They were positive for Catarina ink.  Infectious disease was called.  The patient received his initial dose of amphotericin B. He continued to improve and patient was subsequently  transferred to [facility] rehab where he spent 10 days before returning home. Unfortunately he developed pneumonia 3 weeks  later and was hospitalized yet again at [facility].    Neurologic Problem   The patient's pertinent negatives include no weakness. Primary symptoms comment: HA, cryptococcal meningitis. Chronicity: 6/25/17. The current episode started more than 1 month ago. The neurological problem developed suddenly. The problem has been resolved since onset. Pertinent negatives include no abdominal pain, chest pain, confusion, dizziness, fatigue, fever, headaches, nausea, shortness of breath or vomiting. (Confusion, confusion, AMS) Treatments tried: ABX, diflucan, The treatment provided significant relief. (HTN)        Current Outpatient Prescriptions   Medication Sig Dispense Refill   • amLODIPine (NORVASC) 5 MG tablet Take 5 mg by mouth.     • aspirin 81 MG tablet Take 81 mg by mouth.     • CloNIDine (CATAPRES) 0.1 MG tablet 1 PO twice a day prn  if BP over 150 on top or 92 on bottom.     • fluconazole (DIFLUCAN) 200 MG tablet Take 200 mg by mouth Daily.     • lisinopril (PRINIVIL,ZESTRIL) 30 MG tablet Take 30 mg by mouth.       No current facility-administered medications for this visit.        Past Medical History:   Diagnosis Date   • CVA (cerebral vascular accident) 6/26/2017   • Hypertension        Past Surgical History:   Procedure Laterality Date   • CHOLECYSTECTOMY     • TONSILLECTOMY         family history includes Cancer in his father; Heart disease in his mother.    Social History   Substance Use Topics   • Smoking status: Never Smoker   • Smokeless tobacco: Never Used   • Alcohol use No       Review of Systems   Constitutional: Negative.  Negative for fatigue and fever.   HENT: Negative.  Negative for postnasal drip, sneezing and trouble swallowing.    Eyes: Negative.    Respiratory: Negative.  Negative for apnea, choking and shortness of breath.    Cardiovascular: Negative.  Negative for chest pain.   Gastrointestinal: Negative.  Negative for abdominal pain, constipation, diarrhea, nausea and vomiting.   Endocrine:  "Negative.    Genitourinary: Negative.  Negative for dysuria and frequency.   Musculoskeletal: Negative for arthralgias, gait problem and myalgias.   Skin: Negative.    Allergic/Immunologic: Negative.    Neurological: Positive for tremors. Negative for dizziness, speech difficulty, weakness and headaches.   Hematological: Negative.  Negative for adenopathy.   Psychiatric/Behavioral: Negative.  Negative for agitation, confusion and hallucinations.   All other systems reviewed and are negative.      Objective     /88  Pulse 69  Ht 74\" (188 cm)  Wt 198 lb (89.8 kg)  SpO2 97%  BMI 25.42 kg/m2, Body mass index is 25.42 kg/(m^2).    Physical Exam   Constitutional: He is oriented to person, place, and time. He appears well-developed and well-nourished.   HENT:   Head: Normocephalic and atraumatic.   Right Ear: External ear normal.   Left Ear: External ear normal.   Nose: Nose normal.   Mouth/Throat: Oropharynx is clear and moist.   Eyes: Conjunctivae, EOM and lids are normal. Pupils are equal, round, and reactive to light.   Neck: Normal range of motion. Neck supple. Carotid bruit is not present.   Cardiovascular: Normal rate, regular rhythm, S1 normal, S2 normal and normal heart sounds.    Pulmonary/Chest: Effort normal and breath sounds normal.   Abdominal: Soft. Bowel sounds are normal.   Musculoskeletal: Normal range of motion.   Neurological: He is alert and oriented to person, place, and time. He has normal strength and normal reflexes. He displays tremor (intention tremor right finger to nose). No cranial nerve deficit or sensory deficit. He exhibits normal muscle tone. He displays a negative Romberg sign. Coordination and gait normal. GCS eye subscore is 4. GCS verbal subscore is 5. GCS motor subscore is 6.   Reflex Scores:       Tricep reflexes are 2+ on the right side and 2+ on the left side.       Bicep reflexes are 2+ on the right side and 2+ on the left side.       Brachioradialis reflexes are 2+ on " the right side and 2+ on the left side.       Patellar reflexes are 2+ on the right side and 2+ on the left side.       Achilles reflexes are 2+ on the right side and 2+ on the left side.  Skin: Skin is warm and dry.   Psychiatric: He has a normal mood and affect. His speech is normal and behavior is normal. Cognition and memory are normal.   Nursing note and vitals reviewed.        ASSESSMENT/PLAN    Diagnoses and all orders for this visit:    Meningitis  -     MRI Brain With & Without Contrast; Future    Cerebrovascular accident (CVA) due to bilateral occlusion of middle cerebral arteries  -     MRI Brain With & Without Contrast; Future    Essential hypertension    Bilateral headaches    Other orders  -     CloNIDine (CATAPRES) 0.1 MG tablet; 1 PO twice a day prn  if BP over 150 on top or 92 on bottom.  -     amLODIPine (NORVASC) 5 MG tablet; Take 5 mg by mouth.  -     lisinopril (PRINIVIL,ZESTRIL) 30 MG tablet; Take 30 mg by mouth.  -     aspirin 81 MG tablet; Take 81 mg by mouth.      MEDICAL DECISION MAKIN. MRI brain for surveillance . Patient currently not taking antiplatelet but at this point stroke risk low.   2. Patient continues to remain at his baseline and denies HA or impaired cognition/memory and family confirms.   3. Uncertain of paresthesia but suspect related to Clonidine as seems to be occurring after clonidine is taken.  and patient to discuss with PCP.  4. BP managed by PCP  5. Healthy BMI  6. Does not use tobacco   7. Continue ASA 81 mg daily  8. Currently not taking a statin.  9. If paresthesia continue  Or return and seem not to be correlated with clonidine the patient is to notify the office.   10. Patient is counseled on stroke signs and symptoms using FAST and Time Saved is Brain Saved.   allergies and all known medications/prescriptions have been reviewed using resources available on this encounter.    Return in about 6 months (around 5/10/2018).        KALI Allred

## 2017-11-17 ENCOUNTER — HOSPITAL ENCOUNTER (OUTPATIENT)
Dept: MRI IMAGING | Facility: HOSPITAL | Age: 72
Discharge: HOME | End: 2017-11-17
Admitting: CLINICAL NURSE SPECIALIST

## 2017-11-17 DIAGNOSIS — G03.9 MENINGITIS: ICD-10-CM

## 2017-11-17 DIAGNOSIS — I63.513 CEREBROVASCULAR ACCIDENT (CVA) DUE TO BILATERAL OCCLUSION OF MIDDLE CEREBRAL ARTERIES (HCC): ICD-10-CM

## 2017-11-17 PROCEDURE — A9577 INJ MULTIHANCE: HCPCS | Performed by: CLINICAL NURSE SPECIALIST

## 2017-11-17 PROCEDURE — 0 GADOBENATE DIMEGLUMINE 529 MG/ML SOLUTION: Performed by: CLINICAL NURSE SPECIALIST

## 2017-11-17 PROCEDURE — 70553 MRI BRAIN STEM W/O & W/DYE: CPT

## 2017-11-17 RX ADMIN — GADOBENATE DIMEGLUMINE 15 ML: 529 INJECTION, SOLUTION INTRAVENOUS at 07:00

## 2017-11-24 NOTE — PROGRESS NOTES
[facility] PHYSICIAN SERVICES  [facility]  [address]  [address]  Dept: [phone]  Dept Fax: [phone]: [phone]    Yousuf Blankenship is a 81 y.o. male who presents today for his medical conditions/complaints as noted below. Earnest Johnson is c/o of Hypertension        HPI:     HPI   Chief Complaint   Patient presents with    Hypertension   He is actually here for his high bp and not his annual wellness. His bp has been running high and he is concerned due to history of kidney problems. 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 No pertinent surgical history. History reviewed. No pertinent family history. Social History   Substance Use Topics    Smoking status: Never Smoker    Smokeless tobacco: Never Used    Alcohol use No      Current Outpatient Prescriptions   Medication Sig Dispense Refill    fluconazole (DIFLUCAN) 200 MG tablet Take 800 mg by mouth daily      aspirin 81 MG tablet Take 81 mg by mouth daily.  amLODIPine (NORVASC) 5 MG tablet Take 1 tablet by mouth nightly 30 tablet 3    cloNIDine (CATAPRES) 0.1 MG tablet 1 PO twice a day prn  if BP over 150 on top or 92 on bottom. 60 tablet 1    lisinopril (ZESTRIL) 30 MG tablet Take 1 tablet by mouth daily 30 tablet 3     No current facility-administered medications for this visit.       Allergies   Allergen Reactions    Demerol Hcl [Meperidine] Anxiety    Dilaudid [Hydromorphone Hcl] Anxiety       Health Maintenance   Topic Date Due    Pneumococcal low/med risk (2 of 2 - PPSV23) 08/29/2017    Flu vaccine (1) 09/01/2017    DTaP/Tdap/Td vaccine (1 - Tdap) 07/16/2018 (Originally 6/29/1964)    Lipid screen  07/21/2022    Colon cancer screen colonoscopy  05/22/2027    Zostavax vaccine  Addressed    Hepatitis C screen  Completed Subjective:      Review of Systems   Constitutional: Negative. Cardiovascular:        HTN       Objective:     Physical Exam   Constitutional: He is oriented to person, place, and time. He appears well-developed and well-nourished. HENT:   Head: Normocephalic. Cardiovascular: Normal rate, regular rhythm and normal heart sounds. Pulmonary/Chest: Effort normal and breath sounds normal.   Musculoskeletal: He exhibits no edema. Neurological: He is alert and oriented to person, place, and time. Skin: Skin is warm and dry. Psychiatric: He has a normal mood and affect. His behavior is normal. Judgment and thought content normal.   Nursing note and vitals reviewed. BP (!) 150/82   Pulse 58   Temp 97.8 °F (36.6 °C) (Temporal)   Ht 6' 2\" (1.88 m)   Wt 190 lb (86.2 kg)   SpO2 99%   BMI 24.39 kg/m²     Assessment:      1. Essential hypertension  Basic Metabolic Panel   2. CKD (chronic kidney disease) stage 3, GFR 30-59 ml/min  Basic Metabolic Panel       Plan:        Patient given educational materials - see patient instructions. Discussed use, benefit, and side effects of prescribed medications. All patient questions answered. Pt voiced understanding. Reviewed health maintenance. Instructed to continue current medications, diet and exercise. Patient agreed with treatment plan. Follow up as directed. MEDICATIONS:  No orders of the defined types were placed in this encounter. ORDERS:  Orders Placed This Encounter   Procedures    Basic Metabolic Panel       Follow-up:  Return in about 3 months (around 1/5/2018), or if symptoms worsen or fail to improve. PATIENT INSTRUCTIONS:  Patient Instructions   Monitor BP every day at least once and record with heart rate. Write it down. Fax them or drop them by to me. Goal is top number under 140 and bottom under 84 if not will increase the lisinopril to 30mg and contact nephrology.     Electronically signed by Ugo Zhu CNP on 11/24/2017 at 9:13 AM

## 2017-11-28 ENCOUNTER — TELEPHONE (OUTPATIENT)
Dept: NEUROLOGY | Facility: CLINIC | Age: 72
End: 2017-11-28

## 2017-11-28 NOTE — TELEPHONE ENCOUNTER
----- Message from KALI Garcia sent at 11/17/2017  8:16 AM CST -----  Please call patient and let know that MRI brain is improved from previous in June 2017 and shows the previous lesion and radiologist identified as small stroke. Continue ASA/statin.  I attempted to contact patient and wife at 2 numbers listed. Did not leave a voice mail. thanks

## 2017-12-05 LAB
FUNGUS (MYCOLOGY) CULTURE: NORMAL
KOH PREP: NORMAL

## 2018-01-05 ENCOUNTER — OFFICE VISIT (OUTPATIENT)
Dept: PRIMARY CARE CLINIC | Age: 73
End: 2018-01-05
Payer: MEDICARE

## 2018-01-05 VITALS
HEIGHT: 74 IN | HEART RATE: 74 BPM | TEMPERATURE: 96.7 F | RESPIRATION RATE: 18 BRPM | OXYGEN SATURATION: 98 % | WEIGHT: 202.2 LBS | SYSTOLIC BLOOD PRESSURE: 140 MMHG | BODY MASS INDEX: 25.95 KG/M2 | DIASTOLIC BLOOD PRESSURE: 88 MMHG

## 2018-01-05 DIAGNOSIS — E78.1 HYPERTRIGLYCERIDEMIA: ICD-10-CM

## 2018-01-05 DIAGNOSIS — I10 ESSENTIAL HYPERTENSION: Primary | ICD-10-CM

## 2018-01-05 DIAGNOSIS — N18.30 CKD (CHRONIC KIDNEY DISEASE) STAGE 3, GFR 30-59 ML/MIN (HCC): ICD-10-CM

## 2018-01-05 LAB
ALBUMIN SERPL-MCNC: 4.2 G/DL (ref 3.5–5.2)
ALP BLD-CCNC: 89 U/L (ref 40–130)
ALT SERPL-CCNC: 16 U/L (ref 5–41)
ANION GAP SERPL CALCULATED.3IONS-SCNC: 12 MMOL/L (ref 7–19)
AST SERPL-CCNC: 23 U/L (ref 5–40)
BILIRUB SERPL-MCNC: 0.5 MG/DL (ref 0.2–1.2)
BUN BLDV-MCNC: 24 MG/DL (ref 8–23)
CALCIUM SERPL-MCNC: 9.3 MG/DL (ref 8.8–10.2)
CHLORIDE BLD-SCNC: 103 MMOL/L (ref 98–111)
CO2: 27 MMOL/L (ref 22–29)
CREAT SERPL-MCNC: 1.4 MG/DL (ref 0.5–1.2)
GFR NON-AFRICAN AMERICAN: 50
GLUCOSE BLD-MCNC: 86 MG/DL (ref 74–109)
POTASSIUM SERPL-SCNC: 4.7 MMOL/L (ref 3.5–5)
SODIUM BLD-SCNC: 142 MMOL/L (ref 136–145)
TOTAL PROTEIN: 7.2 G/DL (ref 6.6–8.7)

## 2018-01-05 PROCEDURE — 4040F PNEUMOC VAC/ADMIN/RCVD: CPT | Performed by: NURSE PRACTITIONER

## 2018-01-05 PROCEDURE — 1036F TOBACCO NON-USER: CPT | Performed by: NURSE PRACTITIONER

## 2018-01-05 PROCEDURE — 3017F COLORECTAL CA SCREEN DOC REV: CPT | Performed by: NURSE PRACTITIONER

## 2018-01-05 PROCEDURE — 99213 OFFICE O/P EST LOW 20 MIN: CPT | Performed by: NURSE PRACTITIONER

## 2018-01-05 PROCEDURE — 36415 COLL VENOUS BLD VENIPUNCTURE: CPT | Performed by: NURSE PRACTITIONER

## 2018-01-05 PROCEDURE — G8427 DOCREV CUR MEDS BY ELIG CLIN: HCPCS | Performed by: NURSE PRACTITIONER

## 2018-01-05 PROCEDURE — 1123F ACP DISCUSS/DSCN MKR DOCD: CPT | Performed by: NURSE PRACTITIONER

## 2018-01-05 PROCEDURE — G8419 CALC BMI OUT NRM PARAM NOF/U: HCPCS | Performed by: NURSE PRACTITIONER

## 2018-01-05 PROCEDURE — G8484 FLU IMMUNIZE NO ADMIN: HCPCS | Performed by: NURSE PRACTITIONER

## 2018-01-05 RX ORDER — LISINOPRIL 30 MG/1
30 TABLET ORAL DAILY
Qty: 90 TABLET | Refills: 3 | Status: SHIPPED | OUTPATIENT
Start: 2018-01-05 | End: 2022-03-25

## 2018-01-05 RX ORDER — AMLODIPINE BESYLATE 5 MG/1
5 TABLET ORAL NIGHTLY
Qty: 90 TABLET | Refills: 3 | Status: SHIPPED | OUTPATIENT
Start: 2018-01-05

## 2018-01-05 NOTE — PROGRESS NOTES
[facility] PHYSICIAN SERVICES  [facility]  [address]  [address]  Dept: [phone]  Dept Fax: [phone]: [phone]    Norma Heard is a 81 y.o. male who presents today for his medical conditions/complaints as noted below. Earnestevan Championjerman is c/o of 3 Month Follow-Up and Discuss Medications        HPI:     HPI   Chief Complaint   Patient presents with    3 Month Follow-Up    Discuss Medications   is on the lisinopril 30 mg in the day and Norvasc 5 mg a night. He did see nephrology and they wanted him to start hctz with the lisinopril but he was worried about his kidneys so he did not. He is off the diflucan and had negative lumbar puncture by infectious disease. He wants to know if he should start his cholesterol meds back. Vitals 1/5/2018 11/2/2017 11/2/2017 75/02/2388   SYSTOLIC 244 [phone]   DIASTOLIC 88 87 91 90   Site Left Arm      Position Sitting      Cuff Size Medium Adult      Pulse 74 70 66    Temp 96.7  98.5    Resp 18 17 18    Weight 202 lb 3.2 oz  190 lb    Height 6' 2\"  6' 2\"    BMI (wt*703/ht~2) 25.96 kg/m2  24.39 kg/m2    Pain Level  0 0      Vitals 10/16/2017 10/16/2017 01/0/9336   SYSTOLIC [phone]   DIASTOLIC [phone] 82   Site  Left Arm    Position  Sitting    Cuff Size  Large Adult    Pulse  66    Temp  97.5    Resp  16    Weight  191 lb 9.6 oz    Height  6' 2\"    BMI (wt*703/ht~2)  24.6 kg/m2    Pain Level          Past Medical History:   Diagnosis Date    Elevated serum creatinine     Hyperlipidemia     Hypertension     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 No family history on file.     Social History   Substance Use Topics    Smoking status: Never Smoker    Smokeless tobacco: Never Used    Alcohol use No      Current Outpatient Prescriptions   Medication Sig Dispense Refill    amLODIPine (NORVASC) 5 MG tablet Take 1 tablet by mouth nightly 90 tablet 3   

## 2018-01-07 ASSESSMENT — ENCOUNTER SYMPTOMS
ALLERGIC/IMMUNOLOGIC NEGATIVE: 1
RESPIRATORY NEGATIVE: 1

## 2018-02-08 ENCOUNTER — NURSE ONLY (OUTPATIENT)
Dept: PRIMARY CARE CLINIC | Age: 73
End: 2018-02-08
Payer: MEDICARE

## 2018-02-08 DIAGNOSIS — E78.1 HYPERTRIGLYCERIDEMIA: ICD-10-CM

## 2018-02-08 DIAGNOSIS — I10 ESSENTIAL HYPERTENSION: Primary | ICD-10-CM

## 2018-02-08 LAB
ALBUMIN SERPL-MCNC: 4.3 G/DL (ref 3.5–5.2)
ALP BLD-CCNC: 93 U/L (ref 40–130)
ALT SERPL-CCNC: 16 U/L (ref 5–41)
ANION GAP SERPL CALCULATED.3IONS-SCNC: 17 MMOL/L (ref 7–19)
AST SERPL-CCNC: 24 U/L (ref 5–40)
BILIRUB SERPL-MCNC: 0.8 MG/DL (ref 0.2–1.2)
BUN BLDV-MCNC: 21 MG/DL (ref 8–23)
CALCIUM SERPL-MCNC: 9.6 MG/DL (ref 8.8–10.2)
CHLORIDE BLD-SCNC: 102 MMOL/L (ref 98–111)
CHOLESTEROL, TOTAL: 212 MG/DL (ref 160–199)
CO2: 24 MMOL/L (ref 22–29)
CREAT SERPL-MCNC: 1.3 MG/DL (ref 0.5–1.2)
GFR NON-AFRICAN AMERICAN: 54
GLUCOSE BLD-MCNC: 84 MG/DL (ref 74–109)
HCT VFR BLD CALC: 44.8 % (ref 42–52)
HDLC SERPL-MCNC: 42 MG/DL (ref 55–121)
HEMOGLOBIN: 14.6 G/DL (ref 14–18)
LDL CHOLESTEROL CALCULATED: 122 MG/DL
MCH RBC QN AUTO: 31.9 PG (ref 27–31)
MCHC RBC AUTO-ENTMCNC: 32.6 G/DL (ref 33–37)
MCV RBC AUTO: 97.8 FL (ref 80–94)
PDW BLD-RTO: 13.4 % (ref 11.5–14.5)
PLATELET # BLD: 166 K/UL (ref 130–400)
PMV BLD AUTO: 11.5 FL (ref 9.4–12.4)
POTASSIUM SERPL-SCNC: 4.9 MMOL/L (ref 3.5–5)
RBC # BLD: 4.58 M/UL (ref 4.7–6.1)
SODIUM BLD-SCNC: 143 MMOL/L (ref 136–145)
TOTAL PROTEIN: 7.5 G/DL (ref 6.6–8.7)
TRIGL SERPL-MCNC: 240 MG/DL (ref 0–149)
WBC # BLD: 6 K/UL (ref 4.8–10.8)

## 2018-02-08 PROCEDURE — 36415 COLL VENOUS BLD VENIPUNCTURE: CPT | Performed by: NURSE PRACTITIONER

## 2018-05-08 ENCOUNTER — OFFICE VISIT (OUTPATIENT)
Dept: PRIMARY CARE CLINIC | Age: 73
End: 2018-05-08
Payer: MEDICARE

## 2018-05-08 VITALS
DIASTOLIC BLOOD PRESSURE: 80 MMHG | HEART RATE: 71 BPM | RESPIRATION RATE: 18 BRPM | SYSTOLIC BLOOD PRESSURE: 112 MMHG | OXYGEN SATURATION: 97 % | BODY MASS INDEX: 25.93 KG/M2 | WEIGHT: 202 LBS | HEIGHT: 74 IN | TEMPERATURE: 98.6 F
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
## 2018-05-08 PROCEDURE — G8419 CALC BMI OUT NRM PARAM NOF/U: HCPCS | Performed by: NURSE PRACTITIONER

## 2018-05-08 PROCEDURE — 1036F TOBACCO NON-USER: CPT | Performed by: NURSE PRACTITIONER

## 2018-05-08 PROCEDURE — 1123F ACP DISCUSS/DSCN MKR DOCD: CPT | Performed by: NURSE PRACTITIONER

## 2018-05-08 PROCEDURE — 99213 OFFICE O/P EST LOW 20 MIN: CPT | Performed by: NURSE PRACTITIONER

## 2018-05-08 PROCEDURE — G8427 DOCREV CUR MEDS BY ELIG CLIN: HCPCS | Performed by: NURSE PRACTITIONER

## 2018-05-08 PROCEDURE — 3017F COLORECTAL CA SCREEN DOC REV: CPT | Performed by: NURSE PRACTITIONER

## 2018-05-08 PROCEDURE — 4040F PNEUMOC VAC/ADMIN/RCVD: CPT | Performed by: NURSE PRACTITIONER

## 2018-05-08 ASSESSMENT — ENCOUNTER SYMPTOMS: ROS SKIN COMMENTS: TICK BITE

## 2018-05-08 ASSESSMENT — PATIENT HEALTH QUESTIONNAIRE - PHQ9
1. LITTLE INTEREST OR PLEASURE IN DOING THINGS: 0
2. FEELING DOWN, DEPRESSED OR HOPELESS: 0
SUM OF ALL RESPONSES TO PHQ QUESTIONS 1-9: 0
SUM OF ALL RESPONSES TO PHQ9 QUESTIONS 1 & 2: 0

## 2018-06-14 ENCOUNTER — OFFICE VISIT (OUTPATIENT)
Dept: NEUROLOGY | Facility: CLINIC | Age: 73
End: 2018-06-14

## 2018-06-14 VITALS
HEART RATE: 72 BPM | BODY MASS INDEX: 27.36 KG/M2 | HEIGHT: 72 IN | WEIGHT: 202 LBS | DIASTOLIC BLOOD PRESSURE: 80 MMHG | SYSTOLIC BLOOD PRESSURE: 120 MMHG

## 2018-06-14 DIAGNOSIS — I10 ESSENTIAL HYPERTENSION: ICD-10-CM

## 2018-06-14 DIAGNOSIS — B45.9: ICD-10-CM

## 2018-06-14 DIAGNOSIS — Z51.81 THERAPEUTIC DRUG MONITORING: Primary | ICD-10-CM

## 2018-06-14 DIAGNOSIS — I63.513 CEREBROVASCULAR ACCIDENT (CVA) DUE TO BILATERAL OCCLUSION OF MIDDLE CEREBRAL ARTERIES (HCC): ICD-10-CM

## 2018-06-14 DIAGNOSIS — I63.312 CEREBRAL INFARCTION DUE TO THROMBOSIS OF LEFT MIDDLE CEREBRAL ARTERY (HCC): ICD-10-CM

## 2018-06-14 PROCEDURE — 99214 OFFICE O/P EST MOD 30 MIN: CPT | Performed by: CLINICAL NURSE SPECIALIST

## 2018-06-14 RX ORDER — ATORVASTATIN CALCIUM 40 MG/1
40 TABLET, FILM COATED ORAL DAILY
Qty: 90 TABLET | Refills: 3 | Status: SHIPPED | OUTPATIENT
Start: 2018-06-14 | End: 2019-06-14

## 2018-06-14 RX ORDER — ATORVASTATIN CALCIUM 40 MG/1
40 TABLET, FILM COATED ORAL DAILY
Qty: 30 TABLET | Refills: 11 | Status: SHIPPED | OUTPATIENT
Start: 2018-06-14 | End: 2018-06-14 | Stop reason: SDUPTHER

## 2018-06-14 NOTE — PATIENT INSTRUCTIONS
Exercising to Lose Weight  Exercising can help you to lose weight. In order to lose weight through exercise, you need to do vigorous-intensity exercise. You can tell that you are exercising with vigorous intensity if you are breathing very hard and fast and cannot hold a conversation while exercising.  Moderate-intensity exercise helps to maintain your current weight. You can tell that you are exercising at a moderate level if you have a higher heart rate and faster breathing, but you are still able to hold a conversation.  How often should I exercise?  Choose an activity that you enjoy and set realistic goals. Your health care provider can help you to make an activity plan that works for you. Exercise regularly as directed by your health care provider. This may include:  · Doing resistance training twice each week, such as:  ¨ Push-ups.  ¨ Sit-ups.  ¨ Lifting weights.  ¨ Using resistance bands.  · Doing a given intensity of exercise for a given amount of time. Choose from these options:  ¨ 150 minutes of moderate-intensity exercise every week.  ¨ 75 minutes of vigorous-intensity exercise every week.  ¨ A mix of moderate-intensity and vigorous-intensity exercise every week.  Children, pregnant women, people who are out of shape, people who are overweight, and older adults may need to consult a health care provider for individual recommendations. If you have any sort of medical condition, be sure to consult your health care provider before starting a new exercise program.  What are some activities that can help me to lose weight?  · Walking at a rate of at least 4.5 miles an hour.  · Jogging or running at a rate of 5 miles per hour.  · Biking at a rate of at least 10 miles per hour.  · Lap swimming.  · Roller-skating or in-line skating.  · Cross-country skiing.  · Vigorous competitive sports, such as football, basketball, and soccer.  · Jumping rope.  · Aerobic dancing.  How can I be more active in my day-to-day  activities?  · Use the stairs instead of the elevator.  · Take a walk during your lunch break.  · If you drive, park your car farther away from work or school.  · If you take public transportation, get off one stop early and walk the rest of the way.  · Make all of your phone calls while standing up and walking around.  · Get up, stretch, and walk around every 30 minutes throughout the day.  What guidelines should I follow while exercising?  · Do not exercise so much that you hurt yourself, feel dizzy, or get very short of breath.  · Consult your health care provider prior to starting a new exercise program.  · Wear comfortable clothes and shoes with good support.  · Drink plenty of water while you exercise to prevent dehydration or heat stroke. Body water is lost during exercise and must be replaced.  · Work out until you breathe faster and your heart beats faster.  This information is not intended to replace advice given to you by your health care provider. Make sure you discuss any questions you have with your health care provider.  Document Released: 01/20/2012 Document Revised: 05/25/2017 Document Reviewed: 05/21/2015  [employer] Patient Education © 2017 [employer]  Calorie Counting for Weight Loss  Calories are units of energy. Your body needs a certain amount of calories from food to keep you going throughout the day. When you eat more calories than your body needs, your body stores the extra calories as fat. When you eat fewer calories than your body needs, your body burns fat to get the energy it needs.  Calorie counting means keeping track of how many calories you eat and drink each day. Calorie counting can be helpful if you need to lose weight. If you make sure to eat fewer calories than your body needs, you should lose weight. Ask your health care provider what a healthy weight is for you.  For calorie counting to work, you will need to eat the right number of calories in a day in order to  lose a healthy amount of weight per week. A dietitian can help you determine how many calories you need in a day and will give you suggestions on how to reach your calorie goal.  · A healthy amount of weight to lose per week is usually 1-2 lb (0.5-0.9 kg). This usually means that your daily calorie intake should be reduced by 500-750 calories.  · Eating 1,200 - 1,500 calories per day can help most women lose weight.  · Eating 1,500 - 1,800 calories per day can help most men lose weight.  What is my plan?  My goal is to have __________ calories per day.  If I have this many calories per day, I should lose around __________ pounds per week.  What do I need to know about calorie counting?  In order to meet your daily calorie goal, you will need to:  · Find out how many calories are in each food you would like to eat. Try to do this before you eat.  · Decide how much of the food you plan to eat.  · Write down what you ate and how many calories it had. Doing this is called keeping a food log.  To successfully lose weight, it is important to balance calorie counting with a healthy lifestyle that includes regular activity. Aim for 150 minutes of moderate exercise (such as walking) or 75 minutes of vigorous exercise (such as running) each week.  Where do I find calorie information?     The number of calories in a food can be found on a Nutrition Facts label. If a food does not have a Nutrition Facts label, try to look up the calories online or ask your dietitian for help.  Remember that calories are listed per serving. If you choose to have more than one serving of a food, you will have to multiply the calories per serving by the amount of servings you plan to eat. For example, the label on a package of bread might say that a serving size is 1 slice and that there are 90 calories in a serving. If you eat 1 slice, you will have eaten 90 calories. If you eat 2 slices, you will have eaten 180 calories.  How do I keep a food  "log?  Immediately after each meal, record the following information in your food log:  · What you ate. Don't forget to include toppings, sauces, and other extras on the food.  · How much you ate. This can be measured in cups, ounces, or number of items.  · How many calories each food and drink had.  · The total number of calories in the meal.  Keep your food log near you, such as in a small notebook in your pocket, or use a mobile salvador or website. Some programs will calculate calories for you and show you how many calories you have left for the day to meet your goal.  What are some calorie counting tips?  · Use your calories on foods and drinks that will fill you up and not leave you hungry:  ¨ Some examples of foods that fill you up are nuts and nut butters, vegetables, lean proteins, and high-fiber foods like whole grains. High-fiber foods are foods with more than 5 g fiber per serving.  ¨ Drinks such as sodas, specialty coffee drinks, alcohol, and juices have a lot of calories, yet do not fill you up.  · Eat nutritious foods and avoid empty calories. Empty calories are calories you get from foods or beverages that do not have many vitamins or protein, such as candy, sweets, and soda. It is better to have a nutritious high-calorie food (such as an avocado) than a food with few nutrients (such as a bag of chips).  · Know how many calories are in the foods you eat most often. This will help you calculate calorie counts faster.  · Pay attention to calories in drinks. Low-calorie drinks include water and unsweetened drinks.  · Pay attention to nutrition labels for \"low fat\" or \"fat free\" foods. These foods sometimes have the same amount of calories or more calories than the full fat versions. They also often have added sugar, starch, or salt, to make up for flavor that was removed with the fat.  · Find a way of tracking calories that works for you. Get creative. Try different apps or programs if writing down calories " does not work for you.  What are some portion control tips?  · Know how many calories are in a serving. This will help you know how many servings of a certain food you can have.  · Use a measuring cup to measure serving sizes. You could also try weighing out portions on a kitchen scale. With time, you will be able to estimate serving sizes for some foods.  · Take some time to put servings of different foods on your favorite plates, bowls, and cups so you know what a serving looks like.  · Try not to eat straight from a bag or box. Doing this can lead to overeating. Put the amount you would like to eat in a cup or on a plate to make sure you are eating the right portion.  · Use smaller plates, glasses, and bowls to prevent overeating.  · Try not to multitask (for example, watch TV or use your computer) while eating. If it is time to eat, sit down at a table and enjoy your food. This will help you to know when you are full. It will also help you to be aware of what you are eating and how much you are eating.  What are tips for following this plan?  Reading food labels   · Check the calorie count compared to the serving size. The serving size may be smaller than what you are used to eating.  · Check the source of the calories. Make sure the food you are eating is high in vitamins and protein and low in saturated and trans fats.  Shopping   · Read nutrition labels while you shop. This will help you make healthy decisions before you decide to purchase your food.  · Make a grocery list and stick to it.  Cooking   · Try to cook your favorite foods in a healthier way. For example, try baking instead of frying.  · Use low-fat dairy products.  Meal planning   · Use more fruits and vegetables. Half of your plate should be fruits and vegetables.  · Include lean proteins like poultry and fish.  How do I count calories when eating out?  · Ask for smaller portion sizes.  · Consider sharing an entree and sides instead of getting  your own entree.  · If you get your own entree, eat only half. Ask for a box at the beginning of your meal and put the rest of your entree in it so you are not tempted to eat it.  · If calories are listed on the menu, choose the lower calorie options.  · Choose dishes that include vegetables, fruits, whole grains, low-fat dairy products, and lean protein.  · Choose items that are boiled, broiled, grilled, or steamed. Stay away from items that are buttered, battered, fried, or served with cream sauce. Items labeled “crispy” are usually fried, unless stated otherwise.  · Choose water, low-fat milk, unsweetened iced tea, or other drinks without added sugar. If you want an alcoholic beverage, choose a lower calorie option such as a glass of wine or light beer.  · Ask for dressings, sauces, and syrups on the side. These are usually high in calories, so you should limit the amount you eat.  · If you want a salad, choose a garden salad and ask for grilled meats. Avoid extra toppings like akbar, cheese, or fried items. Ask for the dressing on the side, or ask for olive oil and vinegar or lemon to use as dressing.  · Estimate how many servings of a food you are given. For example, a serving of cooked rice is ½ cup or about the size of half a baseball. Knowing serving sizes will help you be aware of how much food you are eating at restaurants. The list below tells you how big or small some common portion sizes are based on everyday objects:  ¨ 1 oz--4 stacked dice.  ¨ 3 oz--1 deck of cards.  ¨ 1 tsp--1 die.  ¨ 1 Tbsp--½ a ping-pong ball.  ¨ 2 Tbsp--1 ping-pong ball.  ¨ ½ cup--½ baseball.  ¨ 1 cup--1 baseball.  Summary  · Calorie counting means keeping track of how many calories you eat and drink each day. If you eat fewer calories than your body needs, you should lose weight.  · A healthy amount of weight to lose per week is usually 1-2 lb (0.5-0.9 kg). This usually means reducing your daily calorie intake by 500-750  calories.  · The number of calories in a food can be found on a Nutrition Facts label. If a food does not have a Nutrition Facts label, try to look up the calories online or ask your dietitian for help.  · Use your calories on foods and drinks that will fill you up, and not on foods and drinks that will leave you hungry.  · Use smaller plates, glasses, and bowls to prevent overeating.  This information is not intended to replace advice given to you by your health care provider. Make sure you discuss any questions you have with your health care provider.  Document Released: 12/18/2006 Document Revised: 11/17/2017 Document Reviewed: 11/17/2017  [employer] Interactive Patient Education © 2017 [employer]  Stroke Prevention  Some medical conditions and behaviors are associated with an increased chance of having a stroke. You may prevent a stroke by making healthy choices and managing medical conditions.  How can I reduce my risk of having a stroke?  · Stay physically active. Get at least 30 minutes of activity on most or all days.  · Do not smoke. It may also be helpful to avoid exposure to secondhand smoke.  · Limit alcohol use. Moderate alcohol use is considered to be:  ¨ No more than 2 drinks per day for men.  ¨ No more than 1 drink per day for nonpregnant women.  · Eat healthy foods. This involves:  ¨ Eating 5 or more servings of fruits and vegetables a day.  ¨ Making dietary changes that address high blood pressure (hypertension), high cholesterol, diabetes, or obesity.  · Manage your cholesterol levels.  ¨ Making food choices that are high in fiber and low in saturated fat, trans fat, and cholesterol may control cholesterol levels.  ¨ Take any prescribed medicines to control cholesterol as directed by your health care provider.  · Manage your diabetes.  ¨ Controlling your carbohydrate and sugar intake is recommended to manage diabetes.  ¨ Take any prescribed medicines to control diabetes as directed by your health  care provider.  · Control your hypertension.  ¨ Making food choices that are low in salt (sodium), saturated fat, trans fat, and cholesterol is recommended to manage hypertension.  ¨ Ask your health care provider if you need treatment to lower your blood pressure. Take any prescribed medicines to control hypertension as directed by your health care provider.  ¨ If you are 18-39 years of age, have your blood pressure checked every 3-5 years. If you are 40 years of age or older, have your blood pressure checked every year.  · Maintain a healthy weight.  ¨ Reducing calorie intake and making food choices that are low in sodium, saturated fat, trans fat, and cholesterol are recommended to manage weight.  · Stop drug abuse.  · Avoid taking birth control pills.  ¨ Talk to your health care provider about the risks of taking birth control pills if you are over 35 years old, smoke, get migraines, or have ever had a blood clot.  · Get evaluated for sleep disorders (sleep apnea).  ¨ Talk to your health care provider about getting a sleep evaluation if you snore a lot or have excessive sleepiness.  · Take medicines only as directed by your health care provider.  ¨ For some people, aspirin or blood thinners (anticoagulants) are helpful in reducing the risk of forming abnormal blood clots that can lead to stroke. If you have the irregular heart rhythm of atrial fibrillation, you should be on a blood thinner unless there is a good reason you cannot take them.  ¨ Understand all your medicine instructions.  · Make sure that other conditions (such as anemia or atherosclerosis) are addressed.  Get help right away if:  · You have sudden weakness or numbness of the face, arm, or leg, especially on one side of the body.  · Your face or eyelid droops to one side.  · You have sudden confusion.  · You have trouble speaking (aphasia) or understanding.  · You have sudden trouble seeing in one or both eyes.  · You have sudden trouble  walking.  · You have dizziness.  · You have a loss of balance or coordination.  · You have a sudden, severe headache with no known cause.  · You have new chest pain or an irregular heartbeat.  Any of these symptoms may represent a serious problem that is an emergency. Do not wait to see if the symptoms will go away. Get medical help at once. Call your local emergency services (911 in [place] Do not drive yourself to the hospital.  This information is not intended to replace advice given to you by your health care provider. Make sure you discuss any questions you have with your health care provider.  Document Released: 01/25/2006 Document Revised: 05/25/2017 Document Reviewed: 06/20/2014  [employer] Interactive Patient Education © 2017 [employer]

## 2018-06-14 NOTE — PROGRESS NOTES
Subjective     Chief Complaint   Patient presents with   • Stroke       Harjit Mccoy is a 72 y.o. 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 He is accompanied by himself today. He is here today for follow up for cryptococcal meningitis resolved and ischemic stroke.  He was last seen 11/2017. He continues to be at his baseline. He did have repeat MRI brain 11/2107 that did show lacunar infarct left basal ganglia and no pathologic parenchyma or leptomeningeal/pachymengeal enhancement. Patient was released from Dr. Fern Lo. He had complaints of paresthesia at last visit and this has resolved as well.  He continues with ASA 81 mg but along the way statin was discontinued.patient denies side effects with statin.  He did have labs done 2/2018 and LDL was 122.       As you recall,  He had a very interesting course and is described below. He states he has returned to his baseline. He denies HA, light sensitivity, stiff neck. He denies fever/chills. He feels he has returned to his baseline and his family confirms this as well. Short term memory is intact. He states he does remember going to [facility] but does not recall his stay at [facility] and he does remember going to acute rehab. He would like to return to driving. He is continuing on Diflucan and is scheduled to see Dr. Reno next week. He did have a repeat LP at acute rehab and per the patient it was ok.         Initially with the diagnosis of embolic stroke, the plan was to have LINQ implanted but this was aborted when meningitis confirmed.         Patient was admitted to [facility] mid June 2017 before transfer to [facility] for further neurologic care and LP.    On 6/19/2017 before his admission a small tic was found on his central abdomen.  It was taken off and he was asymptomatic at the time.  2 days later he developed a severe 10 out of 10 frontal headache with slight neck stiffness, fevers, light sensitivity, and  some sound sensitivity.  The headache was continuous in its nature.  He had no previous history of headaches.  He was healthy before this.  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  He was started on a prolonged course of doxycycline.  Over the course of his stay they had difficulty with pain control.  Fiorinal caused extreme sedation and hallucinations.  Steroids caused the same.  The allotted caused somnolence with no benefit and pain relief.  He received his greatest pain relief from Toradol and other NSAIDs.  Over the past 3-4 days after admission he has become increasingly somnolent per the family hence the request to be transferred for further neurologic evaluation.  No seizure activity has been witnessed.  The patient has had no focal weakness or numbness. Upon evaluation at [facility] he wass mildly confused and did complain of a moderate to severe frontal headache.  He was able to be awoken  and followed commands but quickly falls asleep when not stimulated.    MRI brain was done and initially looked as though patient had embolic stroke. However, because he did not improve LP was done and on the same day  there were yeast growing in the CSF.  They were positive for Catarina ink.  Infectious disease was called.  The patient received his initial dose of amphotericin B. He continued to improve and patient was subsequently  transferred to [facility] acute rehab where he spent 10 days before returning home. Unfortunately he developed pneumonia 3 weeks later and was hospitalized yet again at [facility].      Stroke   This is a chronic (left BG) problem. The current episode started more than 1 year ago (2017). Pertinent negatives include no abdominal pain, arthralgias, chest pain, fatigue, fever, headaches, myalgias, nausea, vomiting or weakness. Exacerbated by: meningitis. Treatments tried: ASA, statin. The treatment provided significant  relief.   Neurologic Problem   The patient's pertinent negatives include no weakness. Primary symptoms comment: HA, cryptococcal meningitis. Chronicity: 6/25/17. The current episode started more than 1 month ago. The neurological problem developed suddenly. The problem has been resolved since onset. Pertinent negatives include no abdominal pain, chest pain, confusion, dizziness, fatigue, fever, headaches, nausea, shortness of breath or vomiting. (Confusion, confusion, AMS) Treatments tried: ABX, diflucan, The treatment provided significant relief. (HTN)        Current Outpatient Prescriptions   Medication Sig Dispense Refill   • amLODIPine (NORVASC) 5 MG tablet Take 5 mg by mouth.     • aspirin 81 MG tablet Take 81 mg by mouth.     • lisinopril (PRINIVIL,ZESTRIL) 30 MG tablet Take 30 mg by mouth.     • atorvastatin (LIPITOR) 40 MG tablet Take 1 tablet by mouth Daily. 90 tablet 3     No current facility-administered medications for this visit.        Past Medical History:   Diagnosis Date   • CVA (cerebral vascular accident) 6/26/2017   • Hypertension        Past Surgical History:   Procedure Laterality Date   • CHOLECYSTECTOMY     • TONSILLECTOMY         family history includes Cancer in his father; Heart disease in his mother.    Social History   Substance Use Topics   • Smoking status: Never Smoker   • Smokeless tobacco: Never Used   • Alcohol use No       Review of Systems   Constitutional: Negative.  Negative for fatigue and fever.   HENT: Negative.  Negative for postnasal drip, sneezing and trouble swallowing.    Eyes: Negative.    Respiratory: Negative.  Negative for apnea, choking and shortness of breath.    Cardiovascular: Negative.  Negative for chest pain.   Gastrointestinal: Negative.  Negative for abdominal pain, constipation, diarrhea, nausea and vomiting.   Endocrine: Negative.    Genitourinary: Negative.  Negative for dysuria and frequency.   Musculoskeletal: Negative for arthralgias, gait problem and  "myalgias.   Skin: Negative.    Allergic/Immunologic: Negative.    Neurological: Positive for tremors. Negative for dizziness, speech difficulty, weakness and headaches.   Hematological: Negative.  Negative for adenopathy.   Psychiatric/Behavioral: Negative.  Negative for agitation, confusion and hallucinations.   All other systems reviewed and are negative.      Objective     /80   Pulse 72   Ht 182.9 cm (72\")   Wt 91.6 kg (202 lb)   BMI 27.40 kg/m² , Body mass index is 27.4 kg/m².    Physical Exam   Constitutional: He is oriented to person, place, and time. He appears well-developed and well-nourished.   HENT:   Head: Normocephalic and atraumatic.   Right Ear: External ear normal.   Left Ear: External ear normal.   Nose: Nose normal.   Mouth/Throat: Oropharynx is clear and moist.   Eyes: Conjunctivae, EOM and lids are normal. Pupils are equal, round, and reactive to light.   Neck: Normal range of motion. Neck supple. Carotid bruit is not present.   Cardiovascular: Normal rate, regular rhythm, S1 normal, S2 normal and normal heart sounds.    Pulmonary/Chest: Effort normal and breath sounds normal.   Abdominal: Soft. Bowel sounds are normal.   Musculoskeletal: Normal range of motion.   Neurological: He is alert and oriented to person, place, and time. He has normal strength and normal reflexes. He displays tremor (intention tremor right finger to nose). No cranial nerve deficit or sensory deficit. He exhibits normal muscle tone. He displays a negative Romberg sign. Coordination (no ataxia FTN, HTS intact bilateral) and gait normal.   Reflex Scores:       Tricep reflexes are 2+ on the right side and 2+ on the left side.       Bicep reflexes are 2+ on the right side and 2+ on the left side.       Brachioradialis reflexes are 2+ on the right side and 2+ on the left side.       Patellar reflexes are 2+ on the right side and 2+ on the left side.       Achilles reflexes are 2+ on the right side and 2+ on the left " side.  Awake, alert. No aphasia, no dysarthria  Completes simple and complex commands    CN II:  Visual fields full.  Pupils equally reactive to light  CN III, IV, VI:  Extraocular Muscles full with no signs of nystagmus  CN V:  Facial sensory is symmetric with no asymetries.  CN VII:  Facial motor symmetric  CN VIII:  Gross hearing intact bilaterally  CN IX:  Palate elevates symmetrically  CN X:  Palate elevates symmetrically  CN XI:  Shoulder shrug symmetric  CN XII:  Tongue is midline on protrusion    Full and symmetric strength bilateral upper and lower extremities.   Skin: Skin is warm and dry.   Psychiatric: He has a normal mood and affect. His speech is normal and behavior is normal. Cognition and memory are normal.   Nursing note and vitals reviewed.      MRI BRAIN 2017:    IMPRESSION:  Impression:     1. No acute intracranial abnormality.  2. No pathologic parenchymal or leptomeningeal/pachymeningeal  enhancement.  3. Normal size and configuration of the ventricles.  4. Advanced chronic small vessel ischemic changes with a small chronic  lacunar infarct in the left basal ganglia.    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  -     atorvastatin (LIPITOR) 40 MG tablet; Take 1 tablet by mouth Daily.    MEDICAL DECISION MAKIN.Continue ASA 81 mg daily  2. Start Lipitor 40 mg daily. Will check CMP and CK in 2 weeks. Patient would like to have done with PCP.  4. BP managed by  PCP  5. Healthy BMI. Patient's Body mass index is 27.4 kg/m². BMI is above normal parameters. Recommendations include: educational material and no follow-up required.  6. Does not use tobacco   7. Continue ASA 81 mg daily  8, Patient is counseled on stroke signs and symptoms using FAST and Time Saved is Brain Saved.  9. Fall Risk Assessment  Fallen in past 6 months: 0--> No  Mental Status: 0--> no mental status change  Mobility: 0--> No mobility issues  Medications: 0--> No meds  Total Fall Risk Score: 2     allergies and all known medications/prescriptions have been reviewed using resources available on this encounter.    Return in about 9 months (around 3/14/2019).        Gertrude Mireles, APRN

## 2018-07-06 ENCOUNTER — OFFICE VISIT (OUTPATIENT)
Dept: PRIMARY CARE CLINIC | Age: 73
End: 2018-07-06
Payer: MEDICARE

## 2018-07-06 DIAGNOSIS — Z13.1 SCREENING FOR DIABETES MELLITUS: ICD-10-CM

## 2018-07-06 DIAGNOSIS — N18.30 CKD (CHRONIC KIDNEY DISEASE) STAGE 3, GFR 30-59 ML/MIN (HCC): ICD-10-CM

## 2018-07-06 DIAGNOSIS — I10 ESSENTIAL HYPERTENSION: Primary | ICD-10-CM

## 2018-07-06 DIAGNOSIS — Z13.6 ENCOUNTER FOR LIPID SCREENING FOR CARDIOVASCULAR DISEASE: ICD-10-CM

## 2018-07-06 DIAGNOSIS — Z13.220 ENCOUNTER FOR LIPID SCREENING FOR CARDIOVASCULAR DISEASE: ICD-10-CM

## 2018-07-06 DIAGNOSIS — Z12.5 SCREENING FOR MALIGNANT NEOPLASM OF PROSTATE: ICD-10-CM

## 2018-07-06 DIAGNOSIS — Z51.81 ENCOUNTER FOR THERAPEUTIC DRUG MONITORING: ICD-10-CM

## 2018-07-06 PROBLEM — J18.9 PNEUMONIA OF RIGHT LOWER LOBE DUE TO INFECTIOUS ORGANISM: Status: RESOLVED | Noted: 2017-07-23 | Resolved: 2018-07-06

## 2018-07-06 LAB
ALBUMIN SERPL-MCNC: 4.4 G/DL (ref 3.5–5.2)
ALP BLD-CCNC: 92 U/L (ref 40–130)
ALT SERPL-CCNC: 14 U/L (ref 5–41)
ANION GAP SERPL CALCULATED.3IONS-SCNC: 16 MMOL/L (ref 7–19)
AST SERPL-CCNC: 22 U/L (ref 5–40)
BILIRUB SERPL-MCNC: 0.7 MG/DL (ref 0.2–1.2)
BUN BLDV-MCNC: 22 MG/DL (ref 8–23)
CALCIUM SERPL-MCNC: 9.6 MG/DL (ref 8.8–10.2)
CHLORIDE BLD-SCNC: 100 MMOL/L (ref 98–111)
CHOLESTEROL, TOTAL: 195 MG/DL (ref 160–199)
CO2: 25 MMOL/L (ref 22–29)
CREAT SERPL-MCNC: 1.4 MG/DL (ref 0.5–1.2)
GFR NON-AFRICAN AMERICAN: 50
GLUCOSE BLD-MCNC: 87 MG/DL (ref 74–109)
HDLC SERPL-MCNC: 36 MG/DL (ref 55–121)
LDL CHOLESTEROL CALCULATED: 113 MG/DL
POTASSIUM SERPL-SCNC: 4.7 MMOL/L (ref 3.5–5)
PROSTATE SPECIFIC ANTIGEN: 1.16 NG/ML (ref 0–4)
SODIUM BLD-SCNC: 141 MMOL/L (ref 136–145)
TOTAL CK: 125 U/L (ref 39–308)
TOTAL PROTEIN: 7.3 G/DL (ref 6.6–8.7)
TRIGL SERPL-MCNC: 230 MG/DL (ref 0–149)

## 2018-07-06 PROCEDURE — 4040F PNEUMOC VAC/ADMIN/RCVD: CPT | Performed by: NURSE PRACTITIONER

## 2018-07-06 PROCEDURE — 36415 COLL VENOUS BLD VENIPUNCTURE: CPT | Performed by: NURSE PRACTITIONER

## 2018-07-06 PROCEDURE — G8427 DOCREV CUR MEDS BY ELIG CLIN: HCPCS | Performed by: NURSE PRACTITIONER

## 2018-07-06 PROCEDURE — 1123F ACP DISCUSS/DSCN MKR DOCD: CPT | Performed by: NURSE PRACTITIONER

## 2018-07-06 PROCEDURE — 3017F COLORECTAL CA SCREEN DOC REV: CPT | Performed by: NURSE PRACTITIONER

## 2018-07-06 PROCEDURE — 1036F TOBACCO NON-USER: CPT | Performed by: NURSE PRACTITIONER

## 2018-07-06 PROCEDURE — 99213 OFFICE O/P EST LOW 20 MIN: CPT | Performed by: NURSE PRACTITIONER

## 2018-07-06 PROCEDURE — G8419 CALC BMI OUT NRM PARAM NOF/U: HCPCS | Performed by: NURSE PRACTITIONER

## 2018-07-06 RX ORDER — ATORVASTATIN CALCIUM 40 MG/1
40 TABLET, FILM COATED ORAL
COMMUNITY
Start: 2018-06-14 | End: 2019-06-14

## 2018-07-06 NOTE — PROGRESS NOTES
health maintenance. Instructed to continue current medications, diet and exercise. Patient agreed with treatment plan. Follow up as directed. MEDICATIONS:  No orders of the defined types were placed in this encounter. ORDERS:  Orders Placed This Encounter   Procedures    Psa screening    Comprehensive Metabolic Panel    Lipid Panel    CK       Follow-up:  Return in about 6 months (around 1/6/2019) for medicare pe. PATIENT INSTRUCTIONS:  There are no Patient Instructions on file for this visit. Electronically signed by NAOMI Hathaway CNP on 7/8/2018 at 9:40 PM    EMR Dragon/transcription disclaimer:  Much of this encounter note is electronic transcription/translation of spoken language to printed texts. The electronic translation of spoken language may be erroneous, or at times, nonsensical words or phrases may be inadvertently transcribed.   Although I have reviewed the note for such errors, some may still exist.

## 2018-08-30 ENCOUNTER — OFFICE VISIT (OUTPATIENT)
Dept: PRIMARY CARE CLINIC | Age: 73
End: 2018-08-30
Payer: MEDICARE

## 2018-08-30 VITALS
RESPIRATION RATE: 16 BRPM | DIASTOLIC BLOOD PRESSURE: 86 MMHG | TEMPERATURE: 97.7 F | WEIGHT: 199.6 LBS | HEART RATE: 53 BPM | OXYGEN SATURATION: 98 % | SYSTOLIC BLOOD PRESSURE: 117 MMHG | BODY MASS INDEX: 25.63 KG/M2

## 2018-08-30 DIAGNOSIS — R55 SYNCOPE, UNSPECIFIED SYNCOPE TYPE: Primary | ICD-10-CM

## 2018-08-30 DIAGNOSIS — R73.9 ELEVATED BLOOD SUGAR: ICD-10-CM

## 2018-08-30 DIAGNOSIS — E78.5 HYPERLIPIDEMIA, UNSPECIFIED HYPERLIPIDEMIA TYPE: ICD-10-CM

## 2018-08-30 DIAGNOSIS — R40.4 TRANSIENT ALTERATION OF AWARENESS: ICD-10-CM

## 2018-08-30 LAB
ALBUMIN SERPL-MCNC: 4.4 G/DL (ref 3.5–5.2)
ALP BLD-CCNC: 96 U/L (ref 40–130)
ALT SERPL-CCNC: 21 U/L (ref 5–41)
ANION GAP SERPL CALCULATED.3IONS-SCNC: 16 MMOL/L (ref 7–19)
AST SERPL-CCNC: 25 U/L (ref 5–40)
BILIRUB SERPL-MCNC: 1 MG/DL (ref 0.2–1.2)
BUN BLDV-MCNC: 19 MG/DL (ref 8–23)
CALCIUM SERPL-MCNC: 9.2 MG/DL (ref 8.8–10.2)
CHLORIDE BLD-SCNC: 102 MMOL/L (ref 98–111)
CO2: 23 MMOL/L (ref 22–29)
CREAT SERPL-MCNC: 1.2 MG/DL (ref 0.5–1.2)
GFR NON-AFRICAN AMERICAN: 60
GLUCOSE BLD-MCNC: 108 MG/DL
GLUCOSE BLD-MCNC: 91 MG/DL (ref 74–109)
HBA1C MFR BLD: 6.3 % (ref 4–6)
POTASSIUM SERPL-SCNC: 4.5 MMOL/L (ref 3.5–5)
SODIUM BLD-SCNC: 141 MMOL/L (ref 136–145)
TOTAL CK: 126 U/L (ref 39–308)
TOTAL PROTEIN: 7.5 G/DL (ref 6.6–8.7)

## 2018-08-30 PROCEDURE — 3017F COLORECTAL CA SCREEN DOC REV: CPT | Performed by: NURSE PRACTITIONER

## 2018-08-30 PROCEDURE — G8419 CALC BMI OUT NRM PARAM NOF/U: HCPCS | Performed by: NURSE PRACTITIONER

## 2018-08-30 PROCEDURE — 4040F PNEUMOC VAC/ADMIN/RCVD: CPT | Performed by: NURSE PRACTITIONER

## 2018-08-30 PROCEDURE — 99213 OFFICE O/P EST LOW 20 MIN: CPT | Performed by: NURSE PRACTITIONER

## 2018-08-30 PROCEDURE — 1101F PT FALLS ASSESS-DOCD LE1/YR: CPT | Performed by: NURSE PRACTITIONER

## 2018-08-30 PROCEDURE — G8427 DOCREV CUR MEDS BY ELIG CLIN: HCPCS | Performed by: NURSE PRACTITIONER

## 2018-08-30 PROCEDURE — 1123F ACP DISCUSS/DSCN MKR DOCD: CPT | Performed by: NURSE PRACTITIONER

## 2018-08-30 PROCEDURE — 93000 ELECTROCARDIOGRAM COMPLETE: CPT | Performed by: NURSE PRACTITIONER

## 2018-08-30 PROCEDURE — 36415 COLL VENOUS BLD VENIPUNCTURE: CPT | Performed by: NURSE PRACTITIONER

## 2018-08-30 PROCEDURE — 82962 GLUCOSE BLOOD TEST: CPT | Performed by: NURSE PRACTITIONER

## 2018-08-30 PROCEDURE — 1036F TOBACCO NON-USER: CPT | Performed by: NURSE PRACTITIONER

## 2018-08-30 NOTE — PATIENT INSTRUCTIONS
Monitor bp and heart rate at home for a few day  We will you with labs  Have the stress test  Make sure to stay hydrated. If any chest pain, sweating , nausea.

## 2018-08-30 NOTE — PROGRESS NOTES
[facility] PHYSICIAN SERVICES  [facility]  [address] [place]  [address]  Dept: [phone]  Dept Fax: [phone]: [phone]    Earnest Perales is a 72 y.o. male who presents today for his medical conditions/complaints as noted below. Tobi Bamberger is c/o of Blood Sugar Problem ([place] and felt an ache in his upper back, was there for several hours and passed out cold, EMT took vitals and 911 called, EKG was normal only abnormality they could find was his blood sugar and it was 230 he did not end up going to the hospital)        HPI:     HPI   Chief Complaint   Patient presents with    Blood Sugar Problem     was [place] he [place] on saturday and felt an ache in his upper back, was there for several hours and passed out cold, EMT took vitals and 911 called, EKG was normal only abnormality they could find was his blood sugar and it was 230 he did not end up going to the hospital   he did have a stroke over a year ago but did not feel like he had stroke symptoms on this particular day. He did say he had the pain that was in the upper back just before he passed out. He did not feel he was dehydrated or too hot. He did not go to the hospital because they told him in the ambulance everything looked normal.  He has felt fine over the last 2 days and has not had any more similar episodes. He denies shortness of breath or chest pain. Past Medical History:   Diagnosis Date    Elevated serum creatinine     Hyperlipidemia     Hypertension     Meningitis due to Cryptococcus species ([facility] [address]     Pneumonia       History reviewed. No pertinent surgical history.     Vitals 8/30/2018 5/8/2018 1/5/2018 11/2/2017 11/2/2017 15/92/2622   SYSTOLIC 343 [phone]   DIASTOLIC 86 80 88 87 91 90   Site Left Arm - Left Arm - - -   Position Sitting - Sitting - - -   Cuff Size Medium Adult - Medium Adult - - -   Pulse 53 71 74 70 66 -   Temp 97.7 98.6 sensory deficit. He displays a negative Romberg sign. GCS eye subscore is 4. GCS verbal subscore is 5. GCS motor subscore is 6. Skin: Skin is warm and dry. Psychiatric: He has a normal mood and affect. His behavior is normal. Judgment and thought content normal.   Nursing note and vitals reviewed. /86 (Site: Left Arm, Position: Sitting, Cuff Size: Medium Adult)   Pulse 53   Temp 97.7 °F (36.5 °C) (Temporal)   Resp 16   Wt 199 lb 9.6 oz (90.5 kg)   SpO2 98%   BMI 25.63 kg/m²   Results for POC orders placed in visit on 08/30/18   POCT Glucose   Result Value Ref Range    Glucose 108 mg/dL       Assessment:       Diagnosis Orders   1. Syncope, unspecified syncope type  EKG 12 Lead    Hemoglobin A1C    CK    Comprehensive Metabolic Panel    ECHO Stress Test   2. Elevated blood sugar  POCT Glucose    Hemoglobin A1C    CK    Comprehensive Metabolic Panel    ECHO Stress Test   3. Hyperlipidemia, unspecified hyperlipidemia type  ECHO Stress Test   4. Transient alteration of awareness   ECHO Stress Test       Plan:        Patient given educational materials - see patient instructions. Discussed use, benefit, and side effects of prescribed medications. All patient questions answered. Pt voiced understanding. Reviewed health maintenance. Instructed to continue current medications, diet and exercise. Patient agreed with treatment plan. Follow up as directed. MEDICATIONS:  No orders of the defined types were placed in this encounter. ORDERS:  Orders Placed This Encounter   Procedures    Hemoglobin A1C    CK    Comprehensive Metabolic Panel    POCT Glucose    EKG 12 Lead    ECHO Stress Test       Follow-up:  No Follow-up on file. PATIENT INSTRUCTIONS:  Patient Instructions   Monitor bp and heart rate at home for a few day  We will you with labs  Have the stress test  Make sure to stay hydrated. If any chest pain, sweating , nausea.     Electronically signed by NAOMI Baeza - ELVIS

## 2018-09-06 ENCOUNTER — HOSPITAL ENCOUNTER (OUTPATIENT)
Dept: NON INVASIVE DIAGNOSTICS | Age: 73
Discharge: HOME | End: 2018-09-06
Payer: MEDICARE

## 2018-09-06 DIAGNOSIS — R55 SYNCOPE, UNSPECIFIED SYNCOPE TYPE: ICD-10-CM

## 2018-09-06 DIAGNOSIS — E78.5 HYPERLIPIDEMIA, UNSPECIFIED HYPERLIPIDEMIA TYPE: ICD-10-CM

## 2018-09-06 DIAGNOSIS — R73.9 ELEVATED BLOOD SUGAR: ICD-10-CM

## 2018-09-06 DIAGNOSIS — R40.4 TRANSIENT ALTERATION OF AWARENESS: ICD-10-CM

## 2018-09-06 LAB
LV EF: 50 %
LVEF MODALITY: NORMAL

## 2018-09-06 PROCEDURE — 93350 STRESS TTE ONLY: CPT

## 2018-10-31 ENCOUNTER — OFFICE VISIT (OUTPATIENT)
Dept: PRIMARY CARE CLINIC | Age: 73
End: 2018-10-31
Payer: MEDICARE

## 2018-10-31 VITALS
SYSTOLIC BLOOD PRESSURE: 118 MMHG | HEIGHT: 74 IN | DIASTOLIC BLOOD PRESSURE: 84 MMHG | WEIGHT: 198 LBS | RESPIRATION RATE: 22 BRPM | BODY MASS INDEX: 25.41 KG/M2 | OXYGEN SATURATION: 97 % | TEMPERATURE: 97.1 F | HEART RATE: 76 BPM

## 2018-10-31 DIAGNOSIS — R05.9 COUGH: ICD-10-CM

## 2018-10-31 DIAGNOSIS — J06.9 URI, ACUTE: Primary | ICD-10-CM

## 2018-10-31 PROCEDURE — 4040F PNEUMOC VAC/ADMIN/RCVD: CPT | Performed by: FAMILY MEDICINE

## 2018-10-31 PROCEDURE — 99213 OFFICE O/P EST LOW 20 MIN: CPT | Performed by: FAMILY MEDICINE

## 2018-10-31 PROCEDURE — G8484 FLU IMMUNIZE NO ADMIN: HCPCS | Performed by: FAMILY MEDICINE

## 2018-10-31 PROCEDURE — 3017F COLORECTAL CA SCREEN DOC REV: CPT | Performed by: FAMILY MEDICINE

## 2018-10-31 PROCEDURE — G8419 CALC BMI OUT NRM PARAM NOF/U: HCPCS | Performed by: FAMILY MEDICINE

## 2018-10-31 PROCEDURE — 1101F PT FALLS ASSESS-DOCD LE1/YR: CPT | Performed by: FAMILY MEDICINE

## 2018-10-31 PROCEDURE — G8427 DOCREV CUR MEDS BY ELIG CLIN: HCPCS | Performed by: FAMILY MEDICINE

## 2018-10-31 PROCEDURE — 1123F ACP DISCUSS/DSCN MKR DOCD: CPT | Performed by: FAMILY MEDICINE

## 2018-10-31 PROCEDURE — 1036F TOBACCO NON-USER: CPT | Performed by: FAMILY MEDICINE

## 2018-10-31 RX ORDER — DEXTROMETHORPHAN HYDROBROMIDE AND PROMETHAZINE HYDROCHLORIDE 15; 6.25 MG/5ML; MG/5ML
5 SYRUP ORAL 4 TIMES DAILY PRN
Qty: 240 ML | Refills: 0 | Status: SHIPPED | OUTPATIENT
Start: 2018-10-31 | End: 2018-11-07

## 2018-10-31 RX ORDER — AZITHROMYCIN 250 MG/1
TABLET, FILM COATED ORAL
Qty: 1 PACKET | Refills: 0 | Status: SHIPPED | OUTPATIENT
Start: 2018-10-31 | End: 2018-11-04

## 2018-10-31 ASSESSMENT — ENCOUNTER SYMPTOMS
SINUS PRESSURE: 1
COLOR CHANGE: 0
NAUSEA: 0
ABDOMINAL PAIN: 0
RHINORRHEA: 1
COUGH: 1
SORE THROAT: 1
VOMITING: 0
SINUS PAIN: 1

## 2019-01-10 ENCOUNTER — OFFICE VISIT (OUTPATIENT)
Dept: PRIMARY CARE CLINIC | Age: 74
End: 2019-01-10
Payer: MEDICARE

## 2019-01-10 VITALS
HEART RATE: 69 BPM | DIASTOLIC BLOOD PRESSURE: 78 MMHG | TEMPERATURE: 98 F | WEIGHT: 201.6 LBS | BODY MASS INDEX: 25.87 KG/M2 | OXYGEN SATURATION: 98 % | SYSTOLIC BLOOD PRESSURE: 122 MMHG | RESPIRATION RATE: 18 BRPM | HEIGHT: 74 IN

## 2019-01-10 DIAGNOSIS — Z13.220 ENCOUNTER FOR LIPID SCREENING FOR CARDIOVASCULAR DISEASE: ICD-10-CM

## 2019-01-10 DIAGNOSIS — Z13.6 ENCOUNTER FOR LIPID SCREENING FOR CARDIOVASCULAR DISEASE: ICD-10-CM

## 2019-01-10 DIAGNOSIS — Z00.00 ROUTINE GENERAL MEDICAL EXAMINATION AT A HEALTH CARE FACILITY: ICD-10-CM

## 2019-01-10 DIAGNOSIS — I10 ESSENTIAL HYPERTENSION: Primary | ICD-10-CM

## 2019-01-10 DIAGNOSIS — Z13.1 SCREENING FOR DIABETES MELLITUS: ICD-10-CM

## 2019-01-10 DIAGNOSIS — R73.9 ELEVATED BLOOD SUGAR: ICD-10-CM

## 2019-01-10 LAB
ALBUMIN SERPL-MCNC: 4.3 G/DL (ref 3.5–5.2)
ALP BLD-CCNC: 87 U/L (ref 40–130)
ALT SERPL-CCNC: 21 U/L (ref 5–41)
ANION GAP SERPL CALCULATED.3IONS-SCNC: 14 MMOL/L (ref 7–19)
AST SERPL-CCNC: 22 U/L (ref 5–40)
BILIRUB SERPL-MCNC: 1 MG/DL (ref 0.2–1.2)
BUN BLDV-MCNC: 20 MG/DL (ref 8–23)
CALCIUM SERPL-MCNC: 9.7 MG/DL (ref 8.8–10.2)
CHLORIDE BLD-SCNC: 107 MMOL/L (ref 98–111)
CHOLESTEROL, TOTAL: 126 MG/DL (ref 160–199)
CO2: 26 MMOL/L (ref 22–29)
CREAT SERPL-MCNC: 1.4 MG/DL (ref 0.5–1.2)
GFR NON-AFRICAN AMERICAN: 50
GLUCOSE BLD-MCNC: 104 MG/DL (ref 74–109)
HBA1C MFR BLD: 6.1 % (ref 4–6)
HDLC SERPL-MCNC: 37 MG/DL (ref 55–121)
LDL CHOLESTEROL CALCULATED: 60 MG/DL
POTASSIUM SERPL-SCNC: 4.6 MMOL/L (ref 3.5–5)
SODIUM BLD-SCNC: 147 MMOL/L (ref 136–145)
TOTAL PROTEIN: 7.4 G/DL (ref 6.6–8.7)
TRIGL SERPL-MCNC: 145 MG/DL (ref 0–149)

## 2019-01-10 PROCEDURE — G8484 FLU IMMUNIZE NO ADMIN: HCPCS | Performed by: NURSE PRACTITIONER

## 2019-01-10 PROCEDURE — G0439 PPPS, SUBSEQ VISIT: HCPCS | Performed by: NURSE PRACTITIONER

## 2019-01-10 PROCEDURE — 4040F PNEUMOC VAC/ADMIN/RCVD: CPT | Performed by: NURSE PRACTITIONER

## 2019-01-10 ASSESSMENT — LIFESTYLE VARIABLES: HOW OFTEN DO YOU HAVE A DRINK CONTAINING ALCOHOL: 0

## 2019-01-10 ASSESSMENT — PATIENT HEALTH QUESTIONNAIRE - PHQ9
SUM OF ALL RESPONSES TO PHQ QUESTIONS 1-9: 0
SUM OF ALL RESPONSES TO PHQ QUESTIONS 1-9: 0

## 2019-01-10 ASSESSMENT — ANXIETY QUESTIONNAIRES: GAD7 TOTAL SCORE: 0

## 2019-03-14 ENCOUNTER — OFFICE VISIT (OUTPATIENT)
Dept: NEUROLOGY | Facility: CLINIC | Age: 74
End: 2019-03-14

## 2019-03-14 VITALS
DIASTOLIC BLOOD PRESSURE: 70 MMHG | WEIGHT: 200 LBS | SYSTOLIC BLOOD PRESSURE: 122 MMHG | HEIGHT: 72 IN | HEART RATE: 64 BPM | BODY MASS INDEX: 27.09 KG/M2

## 2019-03-14 DIAGNOSIS — I63.513 CEREBROVASCULAR ACCIDENT (CVA) DUE TO BILATERAL OCCLUSION OF MIDDLE CEREBRAL ARTERIES (HCC): Primary | ICD-10-CM

## 2019-03-14 DIAGNOSIS — B45.9: ICD-10-CM

## 2019-03-14 DIAGNOSIS — E78.5 HYPERLIPIDEMIA, UNSPECIFIED HYPERLIPIDEMIA TYPE: ICD-10-CM

## 2019-03-14 PROBLEM — G04.90 ENCEPHALITIS: Status: RESOLVED | Noted: 2017-06-24 | Resolved: 2019-03-14

## 2019-03-14 PROBLEM — R41.82 ALTERED MENTAL STATUS: Status: RESOLVED | Noted: 2017-06-26 | Resolved: 2019-03-14

## 2019-03-14 PROCEDURE — 99213 OFFICE O/P EST LOW 20 MIN: CPT | Performed by: CLINICAL NURSE SPECIALIST

## 2019-03-14 NOTE — PATIENT INSTRUCTIONS
Stroke Prevention  Some medical conditions and behaviors are associated with a higher chance of having a stroke. You can help prevent a stroke by making nutrition, lifestyle, and other changes, including managing any medical conditions you may have.  What nutrition changes can be made?  · Eat healthy foods. You can do this by:  ? Choosing foods high in fiber, such as fresh fruits and vegetables and whole grains.  ? Eating at least 5 or more servings of fruits and vegetables a day. Try to fill half of your plate at each meal with fruits and vegetables.  ? Choosing lean protein foods, such as lean cuts of meat, poultry without skin, fish, tofu, beans, and nuts.  ? Eating low-fat dairy products.  ? Avoiding foods that are high in salt (sodium). This can help lower blood pressure.  ? Avoiding foods that have saturated fat, trans fat, and cholesterol. This can help prevent high cholesterol.  ? Avoiding processed and premade foods.  · Follow your health care provider's specific guidelines for losing weight, controlling high blood pressure (hypertension), lowering high cholesterol, and managing diabetes. These may include:  ? Reducing your daily calorie intake.  ? Limiting your daily sodium intake to 1,500 milligrams (mg).  ? Using only healthy fats for cooking, such as olive oil, canola oil, or sunflower oil.  ? Counting your daily carbohydrate intake.  What lifestyle changes can be made?  · Maintain a healthy weight. Talk to your health care provider about your ideal weight.  · Get at least 30 minutes of moderate physical activity at least 5 days a week. Moderate activity includes brisk walking, biking, and swimming.  · Do not use any products that contain nicotine or tobacco, such as cigarettes and e-cigarettes. If you need help quitting, ask your health care provider. It may also be helpful to avoid exposure to secondhand smoke.  · Limit alcohol intake to no more than 1 drink a day for nonpregnant women and 2 drinks a  day for men. One drink equals 12 oz of beer, 5 oz of wine, or 1½ oz of hard liquor.  · Stop any illegal drug use.  · Avoid taking birth control pills. Talk to your health care provider about the risks of taking birth control pills if:  ? You are over 35 years old.  ? You smoke.  ? You get migraines.  ? You have ever had a blood clot.  What other changes can be made?  · Manage your cholesterol levels.  ? Eating a healthy diet is important for preventing high cholesterol. If cholesterol cannot be managed through diet alone, you may also need to take medicines.  ? Take any prescribed medicines to control your cholesterol as told by your health care provider.  · Manage your diabetes.  ? Eating a healthy diet and exercising regularly are important parts of managing your blood sugar. If your blood sugar cannot be managed through diet and exercise, you may need to take medicines.  ? Take any prescribed medicines to control your diabetes as told by your health care provider.  · Control your hypertension.  ? To reduce your risk of stroke, try to keep your blood pressure below 130/80.  ? Eating a healthy diet and exercising regularly are an important part of controlling your blood pressure. If your blood pressure cannot be managed through diet and exercise, you may need to take medicines.  ? Take any prescribed medicines to control hypertension as told by your health care provider.  ? Ask your health care provider if you should monitor your blood pressure at home.  ? Have your blood pressure checked every year, even if your blood pressure is normal. Blood pressure increases with age and some medical conditions.  · Get evaluated for sleep disorders (sleep apnea). Talk to your health care provider about getting a sleep evaluation if you snore a lot or have excessive sleepiness.  · Take over-the-counter and prescription medicines only as told by your health care provider. Aspirin or blood thinners (antiplatelets or  anticoagulants) may be recommended to reduce your risk of forming blood clots that can lead to stroke.  · Make sure that any other medical conditions you have, such as atrial fibrillation or atherosclerosis, are managed.  What are the warning signs of a stroke?  The warning signs of a stroke can be easily remembered as BEFAST.  · B is for balance. Signs include:  ? Dizziness.  ? Loss of balance or coordination.  ? Sudden trouble walking.  · E is for eyes. Signs include:  ? A sudden change in vision.  ? Trouble seeing.  · F is for face. Signs include:  ? Sudden weakness or numbness of the face.  ? The face or eyelid drooping to one side.  · A is for arms. Signs include:  ? Sudden weakness or numbness of the arm, usually on one side of the body.  · S is for speech. Signs include:  ? Trouble speaking (aphasia).  ? Trouble understanding.  · T is for time.  ? These symptoms may represent a serious problem that is an emergency. Do not wait to see if the symptoms will go away. Get medical help right away. Call your local emergency services (911 in the [place] Do not drive yourself to the hospital.  · Other signs of stroke may include:  ? A sudden, severe headache with no known cause.  ? Nausea or vomiting.  ? Seizure.    Where to find more information  For more information, visit:  · American Stroke Association: www.strokeassociation.org  · National Stroke Association: www.stroke.org    Summary  · You can prevent a stroke by eating healthy, exercising, not smoking, limiting alcohol intake, and managing any medical conditions you may have.  · Do not use any products that contain nicotine or tobacco, such as cigarettes and e-cigarettes. If you need help quitting, ask your health care provider. It may also be helpful to avoid exposure to secondhand smoke.  · Remember BEFAST for warning signs of stroke. Get help right away if you or a loved one has any of these signs.  This information is not intended to replace advice given to  you by your health care provider. Make sure you discuss any questions you have with your health care provider.  Document Released: 01/25/2006 Document Revised: 01/23/2018 Document Reviewed: 01/23/2018  Elsevier Interactive Patient Education © 2019 [employer]

## 2019-03-14 NOTE — PROGRESS NOTES
Subjective     Chief Complaint   Patient presents with   • Neurologic Problem     No problems doing really well.          Harjit Mccoy is a 73 y.o. 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 He is accompanied by himself today. He is ambulating unassisted.  He is here today for follow up for cryptococcal meningitis resolved and ischemic stroke.  He was last seen 11/2017. He continues to be at his baseline. He did have repeat MRI brain 11/2107 that did show lacunar infarct left basal ganglia and no pathologic parenchyma or leptomeningeal/pachymengeal enhancement. Patient was released from Dr. Fern Lo. He had complaints of paresthesia at last visit and this has resolved as well.  He continues with ASA 81 mg but along the way statin was discontinued.patient denies side effects with statin.  He did have labs done 2/2018 and LDL was 122.         As you recall,  He had a very interesting course and is described below. He states he has returned to his baseline. He denies HA, light sensitivity, stiff neck. He denies fever/chills. He feels he has returned to his baseline and his family confirms this as well. Short term memory is intact. He states he does remember going to [facility] but does not recall his stay at [facility] and he does remember going to acute rehab. He would like to return to driving. He is continuing on Diflucan and is scheduled to see Dr. Reno next week. He did have a repeat LP at acute rehab and per the patient it was ok.         Initially with the diagnosis of embolic stroke, the plan was to have LINQ implanted but this was aborted when meningitis confirmed.        As you recall,  He had a very interesting course and is described below. He states he has returned to his baseline. He denies HA, light sensitivity, stiff neck. He denies fever/chills. He feels he has returned to his baseline and his family confirms this as well. Short term memory is intact. He states he does  remember going to [facility] but does not recall his stay at [facility] and he does remember going to acute rehab. He would like to return to driving. He is continuing on Diflucan and is scheduled to see Dr. Reno next week. He did have a repeat LP at acute rehab and per the patient it was ok.         Initially with the diagnosis of embolic stroke, the plan was to have LINQ implanted but this was aborted when meningitis confirmed.         Patient was admitted to [facility] mid June 2017 before transfer to [facility] for further neurologic care and LP.    On 6/19/2017 before his admission a small tic was found on his central abdomen.  It was taken off and he was asymptomatic at the time.  2 days later he developed a severe 10 out of 10 frontal headache with slight neck stiffness, fevers, light sensitivity, and some sound sensitivity.  The headache was continuous in its nature.  He had no previous history of headaches.  He was healthy before this.  He was admitted to [facility] where his [place] spotted fever IgG was negative and IgM was positive.  He was started on a prolonged course of doxycycline.  Over the course of his stay they had difficulty with pain control.  Fiorinal caused extreme sedation and hallucinations.  Steroids caused the same.  The allotted caused somnolence with no benefit and pain relief.  He received his greatest pain relief from Toradol and other NSAIDs.  Over the past 3-4 days after admission he has become increasingly somnolent per the family hence the request to be transferred for further neurologic evaluation.  No seizure activity has been witnessed.  The patient has had no focal weakness or numbness. Upon evaluation at [facility] he wass mildly confused and did complain of a moderate to severe frontal headache.  He was able to be awoken  and followed commands but quickly falls asleep when not stimulated.    MRI brain was done and initially looked as  though patient had embolic stroke. However, because he did not improve LP was done and on the same day  there were yeast growing in the CSF.  They were positive for Catarina ink.  Infectious disease was called.  The patient received his initial dose of amphotericin B. He continued to improve and patient was subsequently  transferred to [facility] acute rehab where he spent 10 days before returning home. Unfortunately he developed pneumonia 3 weeks later and was hospitalized yet again at [facility].      Stroke   This is a chronic (left BG) problem. The current episode started more than 1 year ago (2017). Pertinent negatives include no abdominal pain, arthralgias, chest pain, fatigue, fever, headaches, myalgias, nausea, vomiting or weakness. Exacerbated by: meningitis. Treatments tried: ASA, statin. The treatment provided significant relief.   Neurologic Problem   The patient's pertinent negatives include no weakness. Primary symptoms comment: HA, cryptococcal meningitis. Chronicity: 6/25/17. The current episode started more than 1 month ago. The neurological problem developed suddenly. The problem has been resolved since onset. Pertinent negatives include no abdominal pain, chest pain, confusion, dizziness, fatigue, fever, headaches, nausea, shortness of breath or vomiting. (Confusion, confusion, AMS) Treatments tried: ABX, diflucan, The treatment provided significant relief. (HTN)        Current Outpatient Medications   Medication Sig Dispense Refill   • amLODIPine (NORVASC) 5 MG tablet Take 5 mg by mouth.     • aspirin 81 MG tablet Take 81 mg by mouth.     • atorvastatin (LIPITOR) 40 MG tablet Take 1 tablet by mouth Daily. 90 tablet 3     No current facility-administered medications for this visit.        Past Medical History:   Diagnosis Date   • CVA (cerebral vascular accident) (CMS/[facility]) 6/26/2017   • Hypertension        Past Surgical History:   Procedure Laterality Date   • CHOLECYSTECTOMY     • TONSILLECTOMY    "      family history includes Cancer in his father; Heart disease in his mother.    Social History     Tobacco Use   • Smoking status: Never Smoker   • Smokeless tobacco: Never Used   Substance Use Topics   • Alcohol use: No   • Drug use: No       Review of Systems   Constitutional: Negative.  Negative for fatigue and fever.   HENT: Negative.  Negative for postnasal drip, sneezing and trouble swallowing.    Eyes: Negative.    Respiratory: Negative.  Negative for apnea, choking and shortness of breath.    Cardiovascular: Negative.  Negative for chest pain.   Gastrointestinal: Negative.  Negative for abdominal pain, constipation, diarrhea, nausea and vomiting.   Endocrine: Negative.  Negative for cold intolerance and heat intolerance.   Genitourinary: Negative.  Negative for dysuria and frequency.   Musculoskeletal: Negative for arthralgias, gait problem and myalgias.   Skin: Negative.    Allergic/Immunologic: Negative.    Neurological: Positive for tremors. Negative for dizziness, speech difficulty, weakness and headaches.   Hematological: Negative.  Negative for adenopathy.   Psychiatric/Behavioral: Negative.  Negative for agitation, confusion and hallucinations.   All other systems reviewed and are negative.      Objective     /70 (BP Location: Left arm, Patient Position: Sitting)   Pulse 64   Ht 182.9 cm (72\")   Wt 90.7 kg (200 lb)   BMI 27.12 kg/m² , Body mass index is 27.12 kg/m².    Physical Exam   Constitutional: He is oriented to person, place, and time. He appears well-developed and well-nourished.   HENT:   Head: Normocephalic and atraumatic.   Right Ear: External ear normal.   Left Ear: External ear normal.   Nose: Nose normal.   Mouth/Throat: Oropharynx is clear and moist.   Eyes: Conjunctivae, EOM and lids are normal. Pupils are equal, round, and reactive to light. Right eye exhibits normal extraocular motion and no nystagmus. Left eye exhibits normal extraocular motion and no nystagmus. Right " pupil is round and reactive. Left pupil is round and reactive. Pupils are equal.   Neck: Normal range of motion. Neck supple. Carotid bruit is not present.   Cardiovascular: Normal rate, regular rhythm and normal heart sounds.   No murmur heard.  Pulmonary/Chest: Effort normal and breath sounds normal. He has no decreased breath sounds. He has no rhonchi.   Abdominal: Soft. Bowel sounds are normal.   Musculoskeletal: Normal range of motion.   Neurological: He is alert and oriented to person, place, and time. He has normal strength and normal reflexes. He displays tremor (intention tremor right finger to nose). No cranial nerve deficit or sensory deficit. He exhibits normal muscle tone. He displays a negative Romberg sign. Coordination (no ataxia FTN, HTS intact bilateral) and gait normal.   Reflex Scores:       Tricep reflexes are 2+ on the right side and 2+ on the left side.       Bicep reflexes are 2+ on the right side and 2+ on the left side.       Brachioradialis reflexes are 2+ on the right side and 2+ on the left side.       Patellar reflexes are 2+ on the right side and 2+ on the left side.       Achilles reflexes are 2+ on the right side and 2+ on the left side.  Awake, alert. No aphasia, no dysarthria  Completes simple and complex commands    CN II:  Visual fields full.  Pupils equally reactive to light  CN III, IV, VI:  Extraocular Muscles full with no signs of nystagmus  CN V:  Facial sensory is symmetric with no asymetries.  CN VII:  Facial motor symmetric  CN VIII:  Gross hearing intact bilaterally  CN IX:  Palate elevates symmetrically  CN X:  Palate elevates symmetrically  CN XI:  Shoulder shrug symmetric  CN XII:  Tongue is midline on protrusion    Full and symmetric strength bilateral upper and lower extremities.   Skin: Skin is warm and dry.   Psychiatric: He has a normal mood and affect. His speech is normal and behavior is normal. Cognition and memory are normal.   Nursing note and vitals  reviewed.           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 continue ipitor 40 mg daily managed by PCP and LDL is at goal less than 70. Even if LDL is low end of normal recommend patient continue low dose statin  4. BP managed by PCP  5. Patient's Body mass index is 27.12 kg/m². BMI is above normal parameters. Recommendations include: none (medical contraindication).  6. Does not use tobacco   7. Continue ASA 81 mg daily  8, Patient is counseled on stroke signs and symptoms using FAST and Time Saved is Brain Saved.  9. Patient exhibits signs of essential tremor, present prior to ischemic stroke and non concerning to patient. Will monitor.     HPI and ROS reviewed and updated.      allergies and all known medications/prescriptions have been reviewed using resources available on this encounter.    Return in about 1 year (around 3/14/2020).        KALI Allred

## 2020-01-13 ENCOUNTER — OFFICE VISIT (OUTPATIENT)
Dept: PRIMARY CARE CLINIC | Age: 75
End: 2020-01-13
Payer: MEDICARE

## 2020-01-13 VITALS
WEIGHT: 205 LBS | TEMPERATURE: 98.3 F | BODY MASS INDEX: 26.31 KG/M2 | SYSTOLIC BLOOD PRESSURE: 150 MMHG | HEART RATE: 64 BPM | OXYGEN SATURATION: 95 % | HEIGHT: 74 IN | DIASTOLIC BLOOD PRESSURE: 80 MMHG | RESPIRATION RATE: 16 BRPM

## 2020-01-13 PROCEDURE — 1036F TOBACCO NON-USER: CPT | Performed by: NURSE PRACTITIONER

## 2020-01-13 PROCEDURE — G8417 CALC BMI ABV UP PARAM F/U: HCPCS | Performed by: NURSE PRACTITIONER

## 2020-01-13 PROCEDURE — 3017F COLORECTAL CA SCREEN DOC REV: CPT | Performed by: NURSE PRACTITIONER

## 2020-01-13 PROCEDURE — G8484 FLU IMMUNIZE NO ADMIN: HCPCS | Performed by: NURSE PRACTITIONER

## 2020-01-13 PROCEDURE — 1123F ACP DISCUSS/DSCN MKR DOCD: CPT | Performed by: NURSE PRACTITIONER

## 2020-01-13 PROCEDURE — 4040F PNEUMOC VAC/ADMIN/RCVD: CPT | Performed by: NURSE PRACTITIONER

## 2020-01-13 PROCEDURE — G8427 DOCREV CUR MEDS BY ELIG CLIN: HCPCS | Performed by: NURSE PRACTITIONER

## 2020-01-13 PROCEDURE — 99213 OFFICE O/P EST LOW 20 MIN: CPT | Performed by: NURSE PRACTITIONER

## 2020-01-13 RX ORDER — PREDNISONE 10 MG/1
TABLET ORAL
Qty: 18 TABLET | Refills: 0 | Status: SHIPPED | OUTPATIENT
Start: 2020-01-13 | End: 2020-07-02

## 2020-01-13 RX ORDER — ATORVASTATIN CALCIUM 40 MG/1
40 TABLET, FILM COATED ORAL DAILY
COMMUNITY

## 2020-01-13 NOTE — PROGRESS NOTES
please click on the \"Sign Up Now\" link. Current as of: June 26, 2019  Content Version: 12.3  © 3682-6161 Healthwise, Incorporated. Care instructions adapted under license by [facility] ([facility]). If you have questions about a medical condition or this instruction, always ask your healthcare professional. [address] any warranty or liability for your use of this information. Electronically signed by NAOMI Guajardo CNP on 1/13/2020 at 5:48 PM    EMR Dragon/transcription disclaimer:  Much of thisencounter note is electronic transcription/translation of spoken language to printed texts. The electronic translation of spoken language may be erroneous, or at times, nonsensical words or phrases may be inadvertentlytranscribed.   Although I have reviewed the note for such errors, some may still exist.

## 2020-01-13 NOTE — PATIENT INSTRUCTIONS
Patient Education   start the oral steroids with food for inflammation  If foot not better in 1 wk have xray  May sleep in plantar fascia brace or tape  Good support shoes, shoes cups  May come back for injection if so I would want the damasoy. Ice and stretch as below. Plantar Fasciitis: Exercises  Introduction  Here are some examples of exercises for you to try. The exercises may be suggested for a condition or for rehabilitation. Start each exercise slowly. Ease off the exercises if you start to have pain. You will be told when to start these exercises and which ones will work best for you. How to do the exercises  Towel stretch   1. Sit with your legs extended and knees straight. 2. Place a towel around your foot just under the toes. 3. Hold each end of the towel in each hand, with your hands above your knees. 4. Pull back with the towel so that your foot stretches toward you. 5. Hold the position for at least 15 to 30 seconds. 6. Repeat 2 to 4 times a session, up to 5 sessions a day. Calf stretch   1. Stand facing a wall with your hands on the wall at about eye level. Put the leg you want to stretch about a step behind your other leg. 2. Keeping your back heel on the floor, bend your front knee until you feel a stretch in the back leg. 3. Hold the stretch for 15 to 30 seconds. Repeat 2 to 4 times. Plantar fascia and calf stretch   1. Stand on a step as shown above. Be sure to hold on to the banister. 2. Slowly let your heels down over the edge of the step as you relax your calf muscles. You should feel a gentle stretch across the bottom of your foot and up the back of your leg to your knee. 3. Hold the stretch about 15 to 30 seconds, and then tighten your calf muscle a little to bring your heel back up to the level of the step. Repeat 2 to 4 times. Towel curls   1. While sitting, place your foot on a towel on the floor and scrunch the towel toward you with your toes.   2. Then, also using

## 2020-01-16 ENCOUNTER — OFFICE VISIT (OUTPATIENT)
Dept: PRIMARY CARE CLINIC | Age: 75
End: 2020-01-16
Payer: MEDICARE

## 2020-01-16 VITALS
HEART RATE: 55 BPM | SYSTOLIC BLOOD PRESSURE: 130 MMHG | TEMPERATURE: 98.2 F | OXYGEN SATURATION: 98 % | WEIGHT: 205.2 LBS | BODY MASS INDEX: 26.34 KG/M2 | DIASTOLIC BLOOD PRESSURE: 80 MMHG | HEIGHT: 74 IN | RESPIRATION RATE: 18 BRPM

## 2020-01-16 LAB
ALBUMIN SERPL-MCNC: 4.3 G/DL (ref 3.5–5.2)
ALP BLD-CCNC: 85 U/L (ref 40–130)
ALT SERPL-CCNC: 29 U/L (ref 5–41)
ANION GAP SERPL CALCULATED.3IONS-SCNC: 14 MMOL/L (ref 7–19)
AST SERPL-CCNC: 31 U/L (ref 5–40)
BILIRUB SERPL-MCNC: 0.9 MG/DL (ref 0.2–1.2)
BUN BLDV-MCNC: 23 MG/DL (ref 8–23)
CALCIUM SERPL-MCNC: 9.6 MG/DL (ref 8.8–10.2)
CHLORIDE BLD-SCNC: 103 MMOL/L (ref 98–111)
CHOLESTEROL, TOTAL: 142 MG/DL (ref 160–199)
CO2: 25 MMOL/L (ref 22–29)
CREAT SERPL-MCNC: 1.3 MG/DL (ref 0.5–1.2)
GFR NON-AFRICAN AMERICAN: 54
GLUCOSE BLD-MCNC: 123 MG/DL (ref 74–109)
HBA1C MFR BLD: 6.7 % (ref 4–6)
HDLC SERPL-MCNC: 53 MG/DL (ref 55–121)
LDL CHOLESTEROL CALCULATED: 72 MG/DL
POTASSIUM SERPL-SCNC: 4.3 MMOL/L (ref 3.5–5)
SODIUM BLD-SCNC: 142 MMOL/L (ref 136–145)
TOTAL PROTEIN: 7.5 G/DL (ref 6.6–8.7)
TRIGL SERPL-MCNC: 87 MG/DL (ref 0–149)

## 2020-01-16 PROCEDURE — 3017F COLORECTAL CA SCREEN DOC REV: CPT | Performed by: NURSE PRACTITIONER

## 2020-01-16 PROCEDURE — G8484 FLU IMMUNIZE NO ADMIN: HCPCS | Performed by: NURSE PRACTITIONER

## 2020-01-16 PROCEDURE — 1123F ACP DISCUSS/DSCN MKR DOCD: CPT | Performed by: NURSE PRACTITIONER

## 2020-01-16 PROCEDURE — G0438 PPPS, INITIAL VISIT: HCPCS | Performed by: NURSE PRACTITIONER

## 2020-01-16 PROCEDURE — 36415 COLL VENOUS BLD VENIPUNCTURE: CPT | Performed by: NURSE PRACTITIONER

## 2020-01-16 PROCEDURE — 4040F PNEUMOC VAC/ADMIN/RCVD: CPT | Performed by: NURSE PRACTITIONER

## 2020-01-16 ASSESSMENT — LIFESTYLE VARIABLES: HOW OFTEN DO YOU HAVE A DRINK CONTAINING ALCOHOL: 0

## 2020-01-16 ASSESSMENT — PATIENT HEALTH QUESTIONNAIRE - PHQ9
SUM OF ALL RESPONSES TO PHQ QUESTIONS 1-9: 0
SUM OF ALL RESPONSES TO PHQ QUESTIONS 1-9: 0

## 2020-07-02 ENCOUNTER — OFFICE VISIT (OUTPATIENT)
Dept: PRIMARY CARE CLINIC | Age: 75
End: 2020-07-02
Payer: MEDICARE

## 2020-07-02 ENCOUNTER — TELEPHONE (OUTPATIENT)
Dept: PRIMARY CARE CLINIC | Age: 75
End: 2020-07-02

## 2020-07-02 VITALS
DIASTOLIC BLOOD PRESSURE: 80 MMHG | RESPIRATION RATE: 18 BRPM | HEIGHT: 74 IN | SYSTOLIC BLOOD PRESSURE: 135 MMHG | BODY MASS INDEX: 25.44 KG/M2 | WEIGHT: 198.2 LBS | HEART RATE: 71 BPM | OXYGEN SATURATION: 96 % | TEMPERATURE: 98.9 F

## 2020-07-02 PROCEDURE — 1123F ACP DISCUSS/DSCN MKR DOCD: CPT | Performed by: NURSE PRACTITIONER

## 2020-07-02 PROCEDURE — 99213 OFFICE O/P EST LOW 20 MIN: CPT | Performed by: NURSE PRACTITIONER

## 2020-07-02 PROCEDURE — G8417 CALC BMI ABV UP PARAM F/U: HCPCS | Performed by: NURSE PRACTITIONER

## 2020-07-02 PROCEDURE — 1036F TOBACCO NON-USER: CPT | Performed by: NURSE PRACTITIONER

## 2020-07-02 PROCEDURE — 4040F PNEUMOC VAC/ADMIN/RCVD: CPT | Performed by: NURSE PRACTITIONER

## 2020-07-02 PROCEDURE — 3017F COLORECTAL CA SCREEN DOC REV: CPT | Performed by: NURSE PRACTITIONER

## 2020-07-02 PROCEDURE — G8427 DOCREV CUR MEDS BY ELIG CLIN: HCPCS | Performed by: NURSE PRACTITIONER

## 2020-07-02 RX ORDER — TIZANIDINE 2 MG/1
2 TABLET ORAL EVERY 6 HOURS PRN
Qty: 30 TABLET | Refills: 0 | Status: SHIPPED | OUTPATIENT
Start: 2020-07-02 | End: 2021-03-24

## 2020-07-02 RX ORDER — PREDNISONE 10 MG/1
TABLET ORAL
Qty: 18 TABLET | Refills: 0 | Status: SHIPPED | OUTPATIENT
Start: 2020-07-02 | End: 2021-03-24 | Stop reason: ALTCHOICE

## 2020-07-02 ASSESSMENT — ENCOUNTER SYMPTOMS
GASTROINTESTINAL NEGATIVE: 1
RESPIRATORY NEGATIVE: 1
BACK PAIN: 1

## 2020-07-02 NOTE — PROGRESS NOTES
[address] [facility]  [address] [place] [address]  [address] [place] [address]  Dept: [phone]  Dept Fax: [phone]: [phone]    Felicia Richardson is a 70 y.o. male who presents today for his medical conditions/complaints as noted below. Felicia Richardson is c/o of Neck Pain (had x-rays, bone spurrs, arthritis, calcium buildup ) and Toe Pain (not much feeling anymore. )        HPI:     HPI   Chief Complaint   Patient presents with    Neck Pain     had x-rays, bone spurrs, arthritis, calcium buildup     Toe Pain     not much feeling anymore. he had injections in neck 15 yrs ago. No recent injury. In the last 2 month more problems looking to right. No numbness or tingling. Seen chiroprator 3 times. He has not tried any meds for it. [facility]. He is having some headaches. Pain is 5 on 0-10 scale. He is having numbness in toes and bottom of feet. He said the numbness in the feet come and go. It is worse after he is up walking for a while. Past Medical History:   Diagnosis Date    Elevated serum creatinine     Hyperlipidemia     Hypertension     Meningitis due to Cryptococcus species ([facility] [address]     Pneumonia       No past surgical history on file. Vitals 7/2/2020 1/16/2020 1/13/2020 1/10/2019 10/31/2018 5/35/8712   SYSTOLIC 261 341 [phone]   DIASTOLIC 80 80 80 78 84 86   Site - - - Left Upper Arm Right Upper Arm Left Arm   Position - - - Sitting Sitting Sitting   Cuff Size - - - Medium Adult Large Adult Medium Adult   Pulse 71 55 64 69 76 53   Temp 98.9 98.2 98.3 98 97.1 97.7   Resp 18 18 16 18 22 16   SpO2 96 98 95 98 97 98   Weight 198 lb 3.2 oz 205 lb 3.2 oz 205 lb 201 lb 9.6 oz 198 lb 199 lb 9.6 oz   Height 6' 2\" 6' 2\" 6' 2\" 6' 2.4\" 6' 2\" -   BMI (wt*703/ht~2) 25.44 kg/m2 26.34 kg/m2 26.32 kg/m2 25.6 kg/m2 25.42 kg/m2 -   Pain Level - - - - - -   Some recent data might be hidden       No family history on file.     Social History     Tobacco Negative. Objective:     Physical Exam  Vitals signs and nursing note reviewed. Constitutional:       Appearance: He is well-developed. HENT:      Head: Normocephalic. Cardiovascular:      Rate and Rhythm: Normal rate and regular rhythm. Pulses:           Dorsalis pedis pulses are 2+ on the right side and 2+ on the left side. Heart sounds: Normal heart sounds. Pulmonary:      Effort: Pulmonary effort is normal.      Breath sounds: Normal breath sounds. Musculoskeletal:      Cervical back: He exhibits decreased range of motion and tenderness. He exhibits no bony tenderness, no swelling, no edema, no deformity, no laceration, no pain, no spasm and normal pulse. Back:    Feet:      Right foot:      Protective Sensation: 10 sites tested. 10 sites sensed. Skin integrity: Skin integrity normal.      Left foot:      Protective Sensation: 10 sites tested. 10 sites sensed. Skin integrity: Skin integrity normal.   Skin:     General: Skin is warm and dry. Neurological:      Mental Status: He is alert and oriented to person, place, and time. Psychiatric:         Behavior: Behavior normal.         Thought Content: Thought content normal.         Judgment: Judgment normal.       /80   Pulse 71   Temp 98.9 °F (37.2 °C)   Resp 18   Ht 6' 2\" (1.88 m)   Wt 198 lb 3.2 oz (89.9 kg)   SpO2 96%   BMI 25.45 kg/m²     Assessment:       Diagnosis Orders   1. Neck pain     2. Degenerative disc disease, cervical     3. Degenerative disc disease, lumbar     4. Neuropathy of both feet           Plan:   More than 50% of the time was spent counseling and coordinating care for a total time of 15min face to face. He brought his x-rays of his neck on disc but I could not look at them. I am going to try to get the records of the x-rays. I have shown him a picture of degenerative disc disease how it affects the spine.   Since he is not having radiating pain on the neck I think it is likely just muscle spasms he is having. The lower legs is likely related to neuropathy from his lower back. Going to start out with some steroids for the inflammation and see if this helps. See my plan below       Patient given educational materials -see patient instructions. Discussed use, benefit, and side effects of prescribed medications. All patient questions answered. Pt voiced understanding. Reviewed health maintenance. Instructed to continue currentmedications, diet and exercise. Patient agreed with treatment plan. Follow up as directed. MEDICATIONS:  Orders Placed This Encounter   Medications    predniSONE (DELTASONE) 10 MG tablet     Sig: Days 1-3 take 1 PO TID, days 4-6 take 1 PO BID, days 7-9 take 1 PO daily. Dispense:  18 tablet     Refill:  0    tiZANidine (ZANAFLEX) 2 MG tablet     Sig: Take 1 tablet by mouth every 6 hours as needed (muscle spasms)     Dispense:  30 tablet     Refill:  0         ORDERS:  No orders of the defined types were placed in this encounter. Follow-up:  No follow-ups on file. PATIENT INSTRUCTIONS:  Patient Instructions     Steroids as directed for inflammation  May still use tylenol for pain  zanaflex for muscle relaxer at home  Warm moist heat to  Neck and see the stretches below  I will get copy of xray and look , may need to do MRI of neck  May need mri of the low back for the feet if not improving  May continue to see chiropractor or we can do PT. If not Improving then appt with neuro or orthopedic. Patient Education        Neck Spasm: Exercises  Introduction  Here are some examples of exercises for you to try. The exercises may be suggested for a condition or for rehabilitation. Start each exercise slowly. Ease off the exercises if you start to have pain. You will be told when to start these exercises and which ones will work best for you. How to do the exercises  Levator scapula stretch   1. Sit in a firm chair, or stand up straight.   2. Gently tilt your head toward your left shoulder. 3. Turn your head to look down into your armpit, bending your head slightly forward. Let the weight of your head stretch your neck muscles. 4. Hold for 15 to 30 seconds. 5. Return to your starting position. 6. Follow the same instructions above, but tilt your head toward your right shoulder. 7. Repeat 2 to 4 times toward each shoulder. Upper trapezius stretch   1. Sit in a firm chair, or stand up straight. 2. This stretch works best if you keep your shoulder down as you lean away from it. To help you remember to do this, start by relaxing your shoulders and lightly holding on to your thighs or your chair. 3. Tilt your head toward your shoulder and hold for 15 to 30 seconds. Let the weight of your head stretch your muscles. 4. If you would like a little added stretch, place your arm behind your back. Use the arm opposite of the direction you are tilting your head. For example, if you are tilting your head to the left, place your right arm behind your back. 5. Repeat 2 to 4 times toward each shoulder. Neck rotation   1. Sit in a firm chair, or stand up straight. 2. Keeping your chin level, turn your head to the right, and hold for 15 to 30 seconds. 3. Turn your head to the left, and hold for 15 to 30 seconds. 4. Repeat 2 to 4 times to each side. Chin tuck   1. Lie on the floor with a rolled-up towel under your neck. Your head should be touching the floor. 2. Slowly bring your chin toward the front of your neck. 3. Hold for a count of 6, and then relax for up to 10 seconds. 4. Repeat 8 to 12 times. Forward neck flexion   1. Sit in a firm chair, or stand up straight. 2. Bend your head forward. 3. Hold for 15 to 30 seconds, then return to your starting position. 4. Repeat 2 to 4 times. Follow-up care is a key part of your treatment and safety. Be sure to make and go to all appointments, and call your doctor if you are having problems.  It's also a good idea to know your test results and keep a list of the medicines you take. Where can you learn more? Go to [email or web address] and sign in to your PLD[employer] account. Enter P962 in the [facility] box to learn more about \"Neck Spasm: Exercises. \"     If you do not have an account, please click on the \"Sign Up Now\" link. Current as of: March 2, 2020               Content Version: 12.5  © 3590-0628 Healthwise, Incorporated. Care instructions adapted under license by [facility] ([facility]). If you have questions about a medical condition or this instruction, always ask your healthcare professional. [place] [address] any warranty or liability for your use of this information. Electronically signed by NAOMI Morris CNP on 7/2/2020 at 5:13 PM    EMR Dragon/transcription disclaimer:  Much of thisencounter note is electronic transcription/translation of spoken language to printed texts. The electronic translation of spoken language may be erroneous, or at times, nonsensical words or phrases may be inadvertentlytranscribed.   Although I have reviewed the note for such errors, some may still exist.

## 2020-07-02 NOTE — TELEPHONE ENCOUNTER
Called and requested cspine x-ray report from [facility] in [facility].

## 2020-07-02 NOTE — PATIENT INSTRUCTIONS
Steroids as directed for inflammation  May still use tylenol for pain  zanaflex for muscle relaxer at home  Warm moist heat to  Neck and see the stretches below  I will get copy of xray and look , may need to do MRI of neck  May need mri of the low back for the feet if not improving  May continue to see chiropractor or we can do PT. If not Improving then appt with neuro or orthopedic. Patient Education        Neck Spasm: Exercises  Introduction  Here are some examples of exercises for you to try. The exercises may be suggested for a condition or for rehabilitation. Start each exercise slowly. Ease off the exercises if you start to have pain. You will be told when to start these exercises and which ones will work best for you. How to do the exercises  Levator scapula stretch   1. Sit in a firm chair, or stand up straight. 2. Gently tilt your head toward your left shoulder. 3. Turn your head to look down into your armpit, bending your head slightly forward. Let the weight of your head stretch your neck muscles. 4. Hold for 15 to 30 seconds. 5. Return to your starting position. 6. Follow the same instructions above, but tilt your head toward your right shoulder. 7. Repeat 2 to 4 times toward each shoulder. Upper trapezius stretch   1. Sit in a firm chair, or stand up straight. 2. This stretch works best if you keep your shoulder down as you lean away from it. To help you remember to do this, start by relaxing your shoulders and lightly holding on to your thighs or your chair. 3. Tilt your head toward your shoulder and hold for 15 to 30 seconds. Let the weight of your head stretch your muscles. 4. If you would like a little added stretch, place your arm behind your back. Use the arm opposite of the direction you are tilting your head. For example, if you are tilting your head to the left, place your right arm behind your back. 5. Repeat 2 to 4 times toward each shoulder. Neck rotation   1.  Sit in a firm chair, or stand up straight. 2. Keeping your chin level, turn your head to the right, and hold for 15 to 30 seconds. 3. Turn your head to the left, and hold for 15 to 30 seconds. 4. Repeat 2 to 4 times to each side. Chin tuck   1. Lie on the floor with a rolled-up towel under your neck. Your head should be touching the floor. 2. Slowly bring your chin toward the front of your neck. 3. Hold for a count of 6, and then relax for up to 10 seconds. 4. Repeat 8 to 12 times. Forward neck flexion   1. Sit in a firm chair, or stand up straight. 2. Bend your head forward. 3. Hold for 15 to 30 seconds, then return to your starting position. 4. Repeat 2 to 4 times. Follow-up care is a key part of your treatment and safety. Be sure to make and go to all appointments, and call your doctor if you are having problems. It's also a good idea to know your test results and keep a list of the medicines you take. Where can you learn more? Go to [email or web address] and sign in to your [employer] account. Enter P962 in the [employer] box to learn more about \"Neck Spasm: Exercises. \"     If you do not have an account, please click on the \"Sign Up Now\" link. Current as of: March 2, 2020               Content Version: 12.5  © 9330-8699 Healthwise, Incorporated. Care instructions adapted under license by [facility] ([facility]). If you have questions about a medical condition or this instruction, always ask your healthcare professional. [place] [address] any warranty or liability for your use of this information.

## 2021-01-14 ASSESSMENT — LIFESTYLE VARIABLES: HOW OFTEN DO YOU HAVE A DRINK CONTAINING ALCOHOL: 0

## 2021-01-14 ASSESSMENT — PATIENT HEALTH QUESTIONNAIRE - PHQ9
1. LITTLE INTEREST OR PLEASURE IN DOING THINGS: 0
2. FEELING DOWN, DEPRESSED OR HOPELESS: 0
SUM OF ALL RESPONSES TO PHQ QUESTIONS 1-9: 0

## 2021-03-03 ASSESSMENT — LIFESTYLE VARIABLES
AUDIT-C TOTAL SCORE: INCOMPLETE
HOW OFTEN DO YOU HAVE A DRINK CONTAINING ALCOHOL: NEVER
AUDIT TOTAL SCORE: INCOMPLETE

## 2021-03-24 ENCOUNTER — OFFICE VISIT (OUTPATIENT)
Dept: PRIMARY CARE CLINIC | Age: 76
End: 2021-03-24
Payer: MEDICARE

## 2021-03-24 VITALS
HEIGHT: 73 IN | RESPIRATION RATE: 16 BRPM | WEIGHT: 192 LBS | DIASTOLIC BLOOD PRESSURE: 80 MMHG | HEART RATE: 54 BPM | OXYGEN SATURATION: 97 % | TEMPERATURE: 97.4 F | BODY MASS INDEX: 25.45 KG/M2 | SYSTOLIC BLOOD PRESSURE: 120 MMHG

## 2021-03-24 DIAGNOSIS — E78.1 HYPERTRIGLYCERIDEMIA: ICD-10-CM

## 2021-03-24 DIAGNOSIS — Z00.00 ROUTINE GENERAL MEDICAL EXAMINATION AT A HEALTH CARE FACILITY: Primary | ICD-10-CM

## 2021-03-24 DIAGNOSIS — B07.0 PLANTAR WART: ICD-10-CM

## 2021-03-24 DIAGNOSIS — Z13.220 ENCOUNTER FOR SCREENING FOR LIPID DISORDER: ICD-10-CM

## 2021-03-24 DIAGNOSIS — Z12.5 SCREENING PSA (PROSTATE SPECIFIC ANTIGEN): ICD-10-CM

## 2021-03-24 LAB
CHOLESTEROL, TOTAL: 112 MG/DL (ref 160–199)
HDLC SERPL-MCNC: 37 MG/DL (ref 55–121)
LDL CHOLESTEROL CALCULATED: 53 MG/DL
PROSTATE SPECIFIC ANTIGEN: 1.07 NG/ML (ref 0–4)
TRIGL SERPL-MCNC: 109 MG/DL (ref 0–149)

## 2021-03-24 PROCEDURE — 36415 COLL VENOUS BLD VENIPUNCTURE: CPT | Performed by: NURSE PRACTITIONER

## 2021-03-24 PROCEDURE — 1123F ACP DISCUSS/DSCN MKR DOCD: CPT | Performed by: NURSE PRACTITIONER

## 2021-03-24 PROCEDURE — 4040F PNEUMOC VAC/ADMIN/RCVD: CPT | Performed by: NURSE PRACTITIONER

## 2021-03-24 PROCEDURE — G8482 FLU IMMUNIZE ORDER/ADMIN: HCPCS | Performed by: NURSE PRACTITIONER

## 2021-03-24 PROCEDURE — 3017F COLORECTAL CA SCREEN DOC REV: CPT | Performed by: NURSE PRACTITIONER

## 2021-03-24 PROCEDURE — G0439 PPPS, SUBSEQ VISIT: HCPCS | Performed by: NURSE PRACTITIONER

## 2021-03-24 RX ORDER — TAMSULOSIN HYDROCHLORIDE 0.4 MG/1
0.4 CAPSULE ORAL DAILY
Qty: 30 CAPSULE | Refills: 5 | Status: SHIPPED | OUTPATIENT
Start: 2021-03-24 | End: 2021-08-23

## 2021-03-24 RX ORDER — SWAB
SWAB, NON-MEDICATED MISCELLANEOUS
COMMUNITY

## 2021-03-24 ASSESSMENT — PATIENT HEALTH QUESTIONNAIRE - PHQ9
SUM OF ALL RESPONSES TO PHQ9 QUESTIONS 1 & 2: 0
SUM OF ALL RESPONSES TO PHQ QUESTIONS 1-9: 0
2. FEELING DOWN, DEPRESSED OR HOPELESS: 0
SUM OF ALL RESPONSES TO PHQ QUESTIONS 1-9: 0

## 2021-03-24 NOTE — PATIENT INSTRUCTIONS
Apply acid to the wart daily and cover with duct tape, remove and debris with the currette. If not improving send to podiatry  By the Flomax to see if this helps with your urine  Keep your appointment with nephrologist and take all of your medicines to show him what you are on. He may want to adjust your blood pressure medicine. Personalized Preventive Plan for Lisa Vogel - 3/24/2021  Medicare offers a range of preventive health benefits. Some of the tests and screenings are paid in full while other may be subject to a deductible, co-insurance, and/or copay. Some of these benefits include a comprehensive review of your medical history including lifestyle, illnesses that may run in your family, and various assessments and screenings as appropriate. After reviewing your medical record and screening and assessments performed today your provider may have ordered immunizations, labs, imaging, and/or referrals for you. A list of these orders (if applicable) as well as your Preventive Care list are included within your After Visit Summary for your review. Other Preventive Recommendations:    · A preventive eye exam performed by an eye specialist is recommended every 1-2 years to screen for glaucoma; cataracts, macular degeneration, and other eye disorders. · A preventive dental visit is recommended every 6 months. · Try to get at least 150 minutes of exercise per week or 10,000 steps per day on a pedometer . · Order or download the FREE \"Exercise & Physical Activity: Your Everyday Guide\" from The [employer]. Call 2-127.234.3990 or search The [employer] online. · You need 2411-7400 mg of calcium and 2173-6882 IU of vitamin D per day.  It is possible to meet your calcium requirement with diet alone, but a vitamin D supplement is usually necessary to meet this goal.  · When exposed to the sun, use a sunscreen that protects against both UVA and UVB radiation with an SPF of 30 or greater. Reapply every 2 to 3 hours or after sweating, drying off with a towel, or swimming. · Always wear a seat belt when traveling in a car. Always wear a helmet when riding a bicycle or motorcycle.

## 2021-03-24 NOTE — PROGRESS NOTES
Medicare Annual Wellness Visit  Name: Rodney Spencer Date: 3/24/2021   MRN: [id number] Sex: Male   Age: 70 y.o. Ethnicity: Non-/Non    : 1945 Race: White      Earnest Aquino is here for Medicare AWV  he did see the [place] [facility]  They set him up to see nephrology due to decrease gfr and kidney cyst.  They had put him on HCTZ but they stopped it because the kidney function was down. His blood pressure has been fine at home. He does not take any medicine for his sugar because it is stayed under control with diet and exercise. He is having some trouble with his urinary flow he feels like he needs to urinate just a slight stream comes out he has not had this problem in the past.  He did have fasting labs through the [place] but they did not check a PSA or lipid. He does not know why. He has the papers with him today we scanned him in media. Plantars wart on the bottom of his left foot he would like for us to address as well today to. Screenings for behavioral, psychosocial and functional/safety risks, and cognitive dysfunction are all negative except as indicated below. These results, as well as other patient data from the [address] form, are documented in Flowsheets linked to this Encounter. Allergies   Allergen Reactions    Demerol Hcl [Meperidine] Anxiety    Dilaudid [Hydromorphone Hcl] Anxiety         Prior to Visit Medications    Medication Sig Taking? Authorizing Provider   Vitamin D, Cholecalciferol, 10 MCG (400 UNIT) CAPS Take by mouth Yes Historical Provider, MD   tamsulosin (FLOMAX) 0.4 MG capsule Take 1 capsule by mouth daily For urine flow Yes NAOMI Uribe CNP   Salicylic Acid 40 % MISC Apply daily to plantar wart, cover with duct tape.  , 1 bottle disp Yes NAOMI Uribe CNP   atorvastatin (LIPITOR) 40 MG tablet Take 40 mg by mouth daily Yes Historical Provider, MD   amLODIPine (NORVASC) 5 MG tablet Take 1 tablet by mouth nightly Yes Beatriz murmurs, rubs, clicks, or gallops, distal pulses intact, no carotid bruits  Abdomen: soft, non-tender, non-distended, normal bowel sounds, no masses or organomegaly  Extremities: no cyanosis, clubbing or edema  Musculoskeletal: normal range of motion, no joint swelling, deformity or tenderness  Neurologic: reflexes normal and symmetric, no cranial nerve deficit, gait, coordination and speech normal  Bottom of his foot there is a plantars wart that is about 8 mm in diameter. I did apply some to TCA to the wart and covered it with a Band-Aid. I instructed him on use of this salicylic at home on the wart cover it with a duck tape  Patient's complete Health Risk Assessment and screening values have been reviewed and are found in Flowsheets. The following problems were reviewed today and where indicated follow up appointments were made and/or referrals ordered. Positive Risk Factor Screenings with Interventions:            General Health and ACP:  General  In general, how would you say your health is?: Very Good  In the past 7 days, have you experienced any of the following?  New or Increased Pain, New or Increased Fatigue, Loneliness, Social Isolation, Stress or Anger?: None of These  Do you get the social and emotional support that you need?: Yes  Do you have a Living Will?: (!) No  Advance Directives     Power of [address] [place] [address] Will ACP-Advance Directive ACP-Power of     Not on File Not on File Not on File Not on File      General Health Risk Interventions:  · No Living Will: Advance Care Planning addressed with patient today   Health Habits/Nutrition Interventions:  · Dental exam overdue:  patient encouraged to make appointment with his/her dentist   Hearing/Vision Interventions:  · Hearing concerns:  patient declines any further evaluation/treatment for hearing issues    Safety:  Safety  Do you have working smoke detectors?: Yes  Have all throw rugs been removed or fastened?: (!) No  Do you have non-slip mats or surfaces in all bathtubs/showers?: (!) No  Do all of your stairways have a railing or banister?: Yes  Are your doorways, halls and stairs free of clutter?: Yes  Do you always fasten your seatbelt when you are in a car?: Yes  Safety Interventions:  · Home safety tips provided     Personalized Preventive Plan   Current Health Maintenance Status  Immunization History   Administered Date(s) Administered    Influenza Vaccine, unspecified formulation 10/23/2018    Influenza Virus Vaccine 10/25/2019    Influenza, High Dose (Fluzone 65 yrs and older) 11/10/2020    Pneumococcal Conjugate 13-valent ([id number]) 08/31/2016    Pneumococcal Polysaccharide ([id number]) 08/20/2014    Tdap (Boostrix, Adacel) 10/30/2013    Zoster Recombinant (Shingrix) 08/13/2018, 10/25/2018        Health Maintenance   Topic Date Due    Diabetic foot exam  Never done    Diabetic retinal exam  Never done    COVID-19 Vaccine (1) Never done   [employer] Visit (AWV)  Never done    A1C test (Diabetic or Prediabetic)  01/28/2022    Potassium monitoring  01/29/2022    Creatinine monitoring  01/29/2022    Lipid screen  03/24/2022    DTaP/Tdap/Td vaccine (2 - Td) 10/30/2023    Colon cancer screen colonoscopy  05/22/2027    Flu vaccine  Completed    Shingles Vaccine  Completed    Pneumococcal 65+ years Vaccine  Completed    Hepatitis C screen  Completed    Hepatitis A vaccine  Aged Out    Hepatitis B vaccine  Aged Out    Hib vaccine  Aged Out    Meningococcal (ACWY) vaccine  Aged Out     Recommendations for [employer] Due: see orders and patient instructions/AVS.  . Recommended screening schedule for the next 5-10 years is provided to the patient in written form: see Patient Instructions/AVS.    Earnest was seen today for medicare awv.     Diagnoses and all orders for this visit:    Routine general medical examination at a health care facility    Hypertriglyceridemia  -     Lipid Panel    Encounter for screening for lipid disorder  -     Lipid Panel    Screening PSA (prostate specific antigen)  -     PSA screening    Plantar wart    Other orders  -     tamsulosin (FLOMAX) 0.4 MG capsule; Take 1 capsule by mouth daily For urine flow  -     Salicylic Acid 40 % MISC; Apply daily to plantar wart, cover with duct tape. , 1 bottle disp                 Apply acid to the wart daily and cover with duct tape, remove and debris with the currette. If not improving send to podiatry  By the Flomax to see if this helps with your urine  Keep your appointment with nephrologist and take all of your medicines to show him what you are on. He may want to adjust your blood pressure medicine.

## 2021-08-23 RX ORDER — TAMSULOSIN HYDROCHLORIDE 0.4 MG/1
CAPSULE ORAL
Qty: 90 CAPSULE | Refills: 1 | Status: SHIPPED | OUTPATIENT
Start: 2021-08-23 | End: 2022-03-03

## 2022-03-03 RX ORDER — TAMSULOSIN HYDROCHLORIDE 0.4 MG/1
CAPSULE ORAL
Qty: 90 CAPSULE | Refills: 1 | Status: SHIPPED | OUTPATIENT
Start: 2022-03-03 | End: 2022-03-25 | Stop reason: SDUPTHER

## 2022-03-25 ENCOUNTER — OFFICE VISIT (OUTPATIENT)
Dept: PRIMARY CARE CLINIC | Age: 77
End: 2022-03-25
Payer: MEDICARE

## 2022-03-25 VITALS
TEMPERATURE: 98.3 F | BODY MASS INDEX: 25.76 KG/M2 | HEIGHT: 73 IN | HEART RATE: 60 BPM | WEIGHT: 194.4 LBS | RESPIRATION RATE: 18 BRPM | SYSTOLIC BLOOD PRESSURE: 116 MMHG | DIASTOLIC BLOOD PRESSURE: 80 MMHG | OXYGEN SATURATION: 96 %

## 2022-03-25 DIAGNOSIS — I10 ESSENTIAL HYPERTENSION: ICD-10-CM

## 2022-03-25 DIAGNOSIS — N18.31 STAGE 3A CHRONIC KIDNEY DISEASE (HCC): ICD-10-CM

## 2022-03-25 DIAGNOSIS — Z00.00 MEDICARE ANNUAL WELLNESS VISIT, SUBSEQUENT: Primary | ICD-10-CM

## 2022-03-25 PROBLEM — R07.81 CHEST PAIN, PLEURITIC: Status: RESOLVED | Noted: 2017-07-22 | Resolved: 2022-03-25

## 2022-03-25 PROBLEM — R63.0 ANOREXIA: Status: RESOLVED | Noted: 2017-07-09 | Resolved: 2022-03-25

## 2022-03-25 PROBLEM — R40.0 DROWSINESS: Status: RESOLVED | Noted: 2017-07-06 | Resolved: 2022-03-25

## 2022-03-25 PROBLEM — R07.9 RIGHT-SIDED CHEST PAIN: Status: RESOLVED | Noted: 2017-07-22 | Resolved: 2022-03-25

## 2022-03-25 PROBLEM — E87.1 HYPONATREMIA: Status: RESOLVED | Noted: 2017-07-22 | Resolved: 2022-03-25

## 2022-03-25 PROCEDURE — 3017F COLORECTAL CA SCREEN DOC REV: CPT | Performed by: NURSE PRACTITIONER

## 2022-03-25 PROCEDURE — G8484 FLU IMMUNIZE NO ADMIN: HCPCS | Performed by: NURSE PRACTITIONER

## 2022-03-25 PROCEDURE — 1123F ACP DISCUSS/DSCN MKR DOCD: CPT | Performed by: NURSE PRACTITIONER

## 2022-03-25 PROCEDURE — G0439 PPPS, SUBSEQ VISIT: HCPCS | Performed by: NURSE PRACTITIONER

## 2022-03-25 PROCEDURE — 4040F PNEUMOC VAC/ADMIN/RCVD: CPT | Performed by: NURSE PRACTITIONER

## 2022-03-25 RX ORDER — MULTIVIT WITH MINERALS/LUTEIN
250 TABLET ORAL DAILY
COMMUNITY

## 2022-03-25 RX ORDER — ZINC GLUCONATE 50 MG
50 TABLET ORAL DAILY
COMMUNITY

## 2022-03-25 RX ORDER — TAMSULOSIN HYDROCHLORIDE 0.4 MG/1
CAPSULE ORAL
Qty: 90 CAPSULE | Refills: 1 | Status: SHIPPED | OUTPATIENT
Start: 2022-03-25

## 2022-03-25 ASSESSMENT — PATIENT HEALTH QUESTIONNAIRE - PHQ9
2. FEELING DOWN, DEPRESSED OR HOPELESS: 0
SUM OF ALL RESPONSES TO PHQ QUESTIONS 1-9: 0
SUM OF ALL RESPONSES TO PHQ QUESTIONS 1-9: 0
SUM OF ALL RESPONSES TO PHQ9 QUESTIONS 1 & 2: 0
1. LITTLE INTEREST OR PLEASURE IN DOING THINGS: 0
SUM OF ALL RESPONSES TO PHQ QUESTIONS 1-9: 0
SUM OF ALL RESPONSES TO PHQ QUESTIONS 1-9: 0

## 2022-03-25 ASSESSMENT — LIFESTYLE VARIABLES: HOW OFTEN DO YOU HAVE A DRINK CONTAINING ALCOHOL: NEVER

## 2022-03-25 NOTE — PROGRESS NOTES
Medicare Annual Wellness Visit    Earnest Riggins is here for Medicare AWV (Pt is here for annual wellness visit. Pt cites no concerns. Pt is not fasting.)    Assessment & Plan   1. Medicare annual wellness visit, subsequent    2. Stage 3a chronic kidney disease ([address]    3. Essential hypertension         Recommendations for Preventive Services Due: see orders and patient instructions/AVS.  Recommended screening schedule for the next 5-10 years is provided to the patient in written form: see Patient Instructions/AVS.     Return if symptoms worsen or fail to improve, for Medicare Annual Wellness Visit in 1 year. Subjective   The following acute and/or chronic problems were also addressed today:  [facility] Occasional checks bs and good. Patient's complete Health Risk Assessment and screening values have been reviewed and are found in Flowsheets. The following problems were reviewed today and where indicated follow up appointments were made and/or referrals ordered.     Positive Risk Factor Screenings with Interventions:               General Health and ACP:  General  In general, how would you say your health is?: Very Good  In the past 7 days, have you experienced any of the following: New or Increased Pain, New or Increased Fatigue, Loneliness, Social Isolation, Stress or Anger?: No  Do you get the social and emotional support that you need?: Yes  Do you have a Living Will?: (!) No    Advance Directives     Power of  Living Will ACP-Advance Directive ACP-Power of     Not on File Not on File Not on File Not on File      General Health Risk Interventions:  · No Living Will: Advance Care Planning addressed with patient today     Hearing/Vision:  Do you or your family notice any trouble with your hearing that hasn't been managed with hearing aids?: (!) Yes  Do you have difficulty driving, watching TV, or doing any of your daily activities because of your eyesight?: No  Have you joint swelling, deformity or tenderness  Neurologic: reflexes normal and symmetric, no cranial nerve deficit, gait, coordination and speech normal       Allergies   Allergen Reactions    Demerol Hcl [Meperidine] Anxiety    Dilaudid [Hydromorphone Hcl] Anxiety     Prior to Visit Medications    Medication Sig Taking? Authorizing Provider   Ascorbic Acid (VITAMIN C) 250 MG tablet Take 250 mg by mouth daily Yes Historical Provider, MD   zinc gluconate 50 MG tablet Take 50 mg by mouth daily Yes Historical Provider, MD   tamsulosin (FLOMAX) 0.4 MG capsule TAKE 1 CAPSULE BY MOUTH EVERY DAY FOR URINE FLOW Yes NAOMI Alejo CNP   Vitamin D, Cholecalciferol, 10 MCG (400 UNIT) CAPS Take by mouth Yes Historical Provider, MD   atorvastatin (LIPITOR) 40 MG tablet Take 40 mg by mouth daily Yes Historical Provider, MD   amLODIPine (NORVASC) 5 MG tablet Take 1 tablet by mouth nightly Yes NAOMI Alejo CNP   lisinopril (ZESTRIL) 30 MG tablet Take 1 tablet by mouth daily Yes NAOMI Alejo CNP   aspirin 81 MG tablet Take 81 mg by mouth daily.  Yes Historical Provider, MD Hernandez (Including outside providers/suppliers regularly involved in providing care):   Patient Care Team:  NAOMI Alejo CNP as PCP - General (Nurse Practitioner)  NAOMI Alejo CNP as PCP - [facility] Empaneled Provider    Reviewed and updated this visit:  Tobacco  Allergies  Meds  Problems  Med Hx  Surg Hx  Soc Hx  Fam Hx

## 2022-03-25 NOTE — PATIENT INSTRUCTIONS
Get labs at [place] and if unable by July return here fasting  Personalized Preventive Plan for Janet Acosta - 3/25/2022  Medicare offers a range of preventive health benefits. Some of the tests and screenings are paid in full while other may be subject to a deductible, co-insurance, and/or copay. Some of these benefits include a comprehensive review of your medical history including lifestyle, illnesses that may run in your family, and various assessments and screenings as appropriate. After reviewing your medical record and screening and assessments performed today your provider may have ordered immunizations, labs, imaging, and/or referrals for you. A list of these orders (if applicable) as well as your Preventive Care list are included within your After Visit Summary for your review. Other Preventive Recommendations:    · A preventive eye exam performed by an eye specialist is recommended every 1-2 years to screen for glaucoma; cataracts, macular degeneration, and other eye disorders. · A preventive dental visit is recommended every 6 months. · Try to get at least 150 minutes of exercise per week or 10,000 steps per day on a pedometer . · Order or download the FREE \"Exercise & Physical Activity: Your Everyday Guide\" from The [employer]. Call 3-326.353.3527 or search The [employer] online. · You need 3879-6400 mg of calcium and 9743-3637 IU of vitamin D per day. It is possible to meet your calcium requirement with diet alone, but a vitamin D supplement is usually necessary to meet this goal.  · When exposed to the sun, use a sunscreen that protects against both UVA and UVB radiation with an SPF of 30 or greater. Reapply every 2 to 3 hours or after sweating, drying off with a towel, or swimming. · Always wear a seat belt when traveling in a car. Always wear a helmet when riding a bicycle or motorcycle.

## 2023-06-22 SDOH — ECONOMIC STABILITY: HOUSING INSECURITY
IN THE LAST 12 MONTHS, WAS THERE A TIME WHEN YOU DID NOT HAVE A STEADY PLACE TO SLEEP OR SLEPT IN A SHELTER (INCLUDING NOW)?: NO

## 2023-06-22 SDOH — ECONOMIC STABILITY: FOOD INSECURITY: WITHIN THE PAST 12 MONTHS, YOU WORRIED THAT YOUR FOOD WOULD RUN OUT BEFORE YOU GOT MONEY TO BUY MORE.: NEVER TRUE

## 2023-06-22 SDOH — ECONOMIC STABILITY: INCOME INSECURITY: HOW HARD IS IT FOR YOU TO PAY FOR THE VERY BASICS LIKE FOOD, HOUSING, MEDICAL CARE, AND HEATING?: NOT HARD AT ALL

## 2023-06-22 SDOH — ECONOMIC STABILITY: TRANSPORTATION INSECURITY
IN THE PAST 12 MONTHS, HAS LACK OF TRANSPORTATION KEPT YOU FROM MEETINGS, WORK, OR FROM GETTING THINGS NEEDED FOR DAILY LIVING?: NO

## 2023-06-22 SDOH — ECONOMIC STABILITY: FOOD INSECURITY: WITHIN THE PAST 12 MONTHS, THE FOOD YOU BOUGHT JUST DIDN'T LAST AND YOU DIDN'T HAVE MONEY TO GET MORE.: NEVER TRUE

## 2023-06-22 ASSESSMENT — PATIENT HEALTH QUESTIONNAIRE - PHQ9
1. LITTLE INTEREST OR PLEASURE IN DOING THINGS: 0
SUM OF ALL RESPONSES TO PHQ9 QUESTIONS 1 & 2: 0
SUM OF ALL RESPONSES TO PHQ QUESTIONS 1-9: 0
2. FEELING DOWN, DEPRESSED OR HOPELESS: 0
SUM OF ALL RESPONSES TO PHQ QUESTIONS 1-9: 0
SUM OF ALL RESPONSES TO PHQ QUESTIONS 1-9: 0
2. FEELING DOWN, DEPRESSED OR HOPELESS: NOT AT ALL
1. LITTLE INTEREST OR PLEASURE IN DOING THINGS: NOT AT ALL
SUM OF ALL RESPONSES TO PHQ QUESTIONS 1-9: 0
SUM OF ALL RESPONSES TO PHQ9 QUESTIONS 1 & 2: 0

## 2023-06-23 ENCOUNTER — OFFICE VISIT (OUTPATIENT)
Dept: PRIMARY CARE CLINIC | Age: 78
End: 2023-06-23
Payer: MEDICARE

## 2023-06-23 VITALS
SYSTOLIC BLOOD PRESSURE: 110 MMHG | HEIGHT: 73 IN | DIASTOLIC BLOOD PRESSURE: 72 MMHG | RESPIRATION RATE: 16 BRPM | WEIGHT: 191 LBS | HEART RATE: 76 BPM | BODY MASS INDEX: 25.31 KG/M2 | OXYGEN SATURATION: 96 % | TEMPERATURE: 97.6 F

## 2023-06-23 DIAGNOSIS — I10 ESSENTIAL HYPERTENSION: ICD-10-CM

## 2023-06-23 DIAGNOSIS — E11.65 TYPE 2 DIABETES MELLITUS WITH HYPERGLYCEMIA, WITHOUT LONG-TERM CURRENT USE OF INSULIN (HCC): ICD-10-CM

## 2023-06-23 DIAGNOSIS — N18.31 STAGE 3A CHRONIC KIDNEY DISEASE (HCC): Primary | ICD-10-CM

## 2023-06-23 PROCEDURE — 3017F COLORECTAL CA SCREEN DOC REV: CPT | Performed by: NURSE PRACTITIONER

## 2023-06-23 PROCEDURE — 3046F HEMOGLOBIN A1C LEVEL >9.0%: CPT | Performed by: NURSE PRACTITIONER

## 2023-06-23 PROCEDURE — 2022F DILAT RTA XM EVC RTNOPTHY: CPT | Performed by: NURSE PRACTITIONER

## 2023-06-23 PROCEDURE — 1036F TOBACCO NON-USER: CPT | Performed by: NURSE PRACTITIONER

## 2023-06-23 PROCEDURE — G8419 CALC BMI OUT NRM PARAM NOF/U: HCPCS | Performed by: NURSE PRACTITIONER

## 2023-06-23 PROCEDURE — 3074F SYST BP LT 130 MM HG: CPT | Performed by: NURSE PRACTITIONER

## 2023-06-23 PROCEDURE — 99214 OFFICE O/P EST MOD 30 MIN: CPT | Performed by: NURSE PRACTITIONER

## 2023-06-23 PROCEDURE — 1123F ACP DISCUSS/DSCN MKR DOCD: CPT | Performed by: NURSE PRACTITIONER

## 2023-06-23 PROCEDURE — G8427 DOCREV CUR MEDS BY ELIG CLIN: HCPCS | Performed by: NURSE PRACTITIONER

## 2023-06-23 PROCEDURE — 3078F DIAST BP <80 MM HG: CPT | Performed by: NURSE PRACTITIONER

## 2023-06-23 RX ORDER — ASPIRIN 81 MG/1
81 TABLET ORAL DAILY
COMMUNITY

## 2023-06-23 ASSESSMENT — ENCOUNTER SYMPTOMS
GASTROINTESTINAL NEGATIVE: 1
ALLERGIC/IMMUNOLOGIC NEGATIVE: 1
EYES NEGATIVE: 1
RESPIRATORY NEGATIVE: 1

## 2023-06-23 NOTE — PROGRESS NOTES
Musculoskeletal: Negative. Allergic/Immunologic: Negative. Neurological: Negative. Hematological: Negative. Psychiatric/Behavioral: Negative. Objective:     Physical Exam  Vitals and nursing note reviewed. Constitutional:       Appearance: Normal appearance. He is well-developed. HENT:      Head: Normocephalic. Cardiovascular:      Rate and Rhythm: Normal rate and regular rhythm. Heart sounds: Normal heart sounds. Pulmonary:      Effort: Pulmonary effort is normal.   Skin:     General: Skin is warm and dry. Capillary Refill: Capillary refill takes less than 2 seconds. Neurological:      General: No focal deficit present. Mental Status: He is alert and oriented to person, place, and time. Psychiatric:         Mood and Affect: Mood normal.         Behavior: Behavior normal.         Thought Content: Thought content normal.         Judgment: Judgment normal.     /72   Pulse 76   Temp 97.6 °F (36.4 °C) (Temporal)   Resp 16   Ht 6' 1\" (1.854 m)   Wt 191 lb (86.6 kg)   SpO2 96%   BMI 25.20 kg/m²     Assessment:       Diagnosis Orders   1. Stage 3a chronic kidney disease (HCC)  dapagliflozin (FARXIGA) 5 MG tablet    DISCONTINUED: dapagliflozin (FARXIGA) 5 MG tablet      2. Type 2 diabetes mellitus with hyperglycemia, without long-term current use of insulin (HCC)  dapagliflozin (FARXIGA) 5 MG tablet    DISCONTINUED: dapagliflozin (FARXIGA) 5 MG tablet      3. Essential hypertension              Plan:   More than 50% of the time was spent counseling and coordinating care for a total time of 30min face to face. Blood pressure is very controlled and his weight is very controlled. Reviewing his lab work with him last year his GFR got down to 39 in the prior years his GFR was anywhere from 45-60. In 2017 he had meningitis and was hospitalized for quite a long time and went into kidney failure over some of the medications they had him on.   His kidneys bounced back at

## 2023-06-23 NOTE — PATIENT INSTRUCTIONS
Start the farxiga 5 mg every day and have blood work repeated at [place] in 3--4 week and get me a copy  If working will do rx to [place]  Avoid any motrin aleve and ibuprofen  Always tell providers you have kidney disease. Watch sodium like pork salt, canned foods.  Packed meat ham

## 2023-06-26 ENCOUNTER — TELEPHONE (OUTPATIENT)
Dept: PRIMARY CARE CLINIC | Age: 78
End: 2023-06-26

## 2023-06-27 ENCOUNTER — TELEPHONE (OUTPATIENT)
Dept: PRIMARY CARE CLINIC | Age: 78
End: 2023-06-27

## 2023-09-11 ENCOUNTER — NURSE ONLY (OUTPATIENT)
Dept: PRIMARY CARE CLINIC | Age: 78
End: 2023-09-11

## 2023-09-11 LAB
ALBUMIN SERPL-MCNC: 4.7 G/DL (ref 3.5–5.2)
ALP SERPL-CCNC: 80 U/L (ref 40–130)
ALT SERPL-CCNC: 24 U/L (ref 5–41)
ANION GAP SERPL CALCULATED.3IONS-SCNC: 15 MMOL/L (ref 7–19)
AST SERPL-CCNC: 30 U/L (ref 5–40)
BACTERIA URNS QL MICRO: NEGATIVE /HPF
BILIRUB SERPL-MCNC: 1 MG/DL (ref 0.2–1.2)
BILIRUB UR QL STRIP: NEGATIVE
BUN SERPL-MCNC: 26 MG/DL (ref 8–23)
CALCIUM SERPL-MCNC: 10.2 MG/DL (ref 8.8–10.2)
CHLORIDE SERPL-SCNC: 105 MMOL/L (ref 98–111)
CLARITY UR: CLEAR
CO2 SERPL-SCNC: 23 MMOL/L (ref 22–29)
COLOR UR: YELLOW
CREAT SERPL-MCNC: 1.5 MG/DL (ref 0.5–1.2)
CREAT UR-MCNC: 105.4 MG/DL (ref 39–259)
CRYSTALS URNS MICRO: ABNORMAL /HPF
EPI CELLS #/AREA URNS AUTO: 0 /HPF (ref 0–5)
GLUCOSE SERPL-MCNC: 120 MG/DL (ref 74–109)
GLUCOSE UR STRIP.AUTO-MCNC: NEGATIVE MG/DL
HGB UR STRIP.AUTO-MCNC: NEGATIVE MG/L
HYALINE CASTS #/AREA URNS AUTO: 0 /HPF (ref 0–8)
KETONES UR STRIP.AUTO-MCNC: NEGATIVE MG/DL
LEUKOCYTE ESTERASE UR QL STRIP.AUTO: NEGATIVE
MAGNESIUM SERPL-MCNC: 2.4 MG/DL (ref 1.6–2.4)
NITRITE UR QL STRIP.AUTO: NEGATIVE
PH UR STRIP.AUTO: 5.5 [PH] (ref 5–8)
POTASSIUM SERPL-SCNC: 4.7 MMOL/L (ref 3.5–5)
PROT SERPL-MCNC: 7.7 G/DL (ref 6.6–8.7)
PROT UR STRIP.AUTO-MCNC: 30 MG/DL
PROT UR-MCNC: 32 MG/DL (ref 15–45)
PTH-INTACT SERPL-MCNC: 35.9 PG/ML (ref 15–65)
RBC #/AREA URNS AUTO: 0 /HPF (ref 0–4)
SODIUM SERPL-SCNC: 143 MMOL/L (ref 136–145)
SP GR UR STRIP.AUTO: 1.02 (ref 1–1.03)
URATE SERPL-MCNC: 6.2 MG/DL (ref 3.4–7)
UROBILINOGEN UR STRIP.AUTO-MCNC: 0.2 E.U./DL
WBC #/AREA URNS AUTO: 0 /HPF (ref 0–5)

## 2023-09-14 ENCOUNTER — HOSPITAL ENCOUNTER (OUTPATIENT)
Dept: ULTRASOUND IMAGING | Age: 78
Discharge: HOME | End: 2023-09-14
Attending: INTERNAL MEDICINE
Payer: MEDICARE

## 2023-09-14 DIAGNOSIS — N18.32 STAGE 3B CHRONIC KIDNEY DISEASE (HCC): ICD-10-CM

## 2023-09-14 LAB
ANCA IFA PATTERN: ABNORMAL
ANCA IFA TITER: ABNORMAL

## 2023-09-14 PROCEDURE — 76770 US EXAM ABDO BACK WALL COMP: CPT

## 2023-09-15 LAB
ALBUMIN SERPL-MCNC: 4.3 G/DL (ref 3.75–5.01)
ALPHA1 GLOB SERPL ELPH-MCNC: 0.26 G/DL (ref 0.19–0.46)
ALPHA2 GLOB SERPL ELPH-MCNC: 0.8 G/DL (ref 0.48–1.05)
B-GLOBULIN SERPL ELPH-MCNC: 0.76 G/DL (ref 0.48–1.1)
GAMMA GLOB SERPL ELPH-MCNC: 1.18 G/DL (ref 0.62–1.51)
INTERPRETATION SERPL IFE-IMP: NORMAL
MONOCLON BAND OBS SERPL: NORMAL
PROT SERPL-MCNC: 7.3 G/DL (ref 6.3–8.2)

## 2023-09-17 LAB
C3 SERPL-MCNC: 113 MG/DL (ref 90–180)
C4 SERPL-MCNC: 11 MG/DL (ref 10–40)
NUCLEAR IGG SER QL IA: NORMAL

## 2023-09-26 ENCOUNTER — OFFICE VISIT (OUTPATIENT)
Dept: PRIMARY CARE CLINIC | Age: 78
End: 2023-09-26
Payer: MEDICARE

## 2023-09-26 VITALS
OXYGEN SATURATION: 96 % | RESPIRATION RATE: 16 BRPM | WEIGHT: 178.2 LBS | SYSTOLIC BLOOD PRESSURE: 110 MMHG | DIASTOLIC BLOOD PRESSURE: 70 MMHG | TEMPERATURE: 96.8 F | HEIGHT: 73 IN | BODY MASS INDEX: 23.62 KG/M2 | HEART RATE: 64 BPM

## 2023-09-26 DIAGNOSIS — E11.65 TYPE 2 DIABETES MELLITUS WITH HYPERGLYCEMIA, WITHOUT LONG-TERM CURRENT USE OF INSULIN (HCC): Primary | ICD-10-CM

## 2023-09-26 DIAGNOSIS — Z23 NEED FOR INFLUENZA VACCINATION: ICD-10-CM

## 2023-09-26 DIAGNOSIS — N18.31 STAGE 3A CHRONIC KIDNEY DISEASE (HCC): ICD-10-CM

## 2023-09-26 LAB
ALBUMIN SERPL-MCNC: 4.5 G/DL (ref 3.5–5.2)
ALP SERPL-CCNC: 82 U/L (ref 40–130)
ALT SERPL-CCNC: 17 U/L (ref 5–41)
ANION GAP SERPL CALCULATED.3IONS-SCNC: 16 MMOL/L (ref 7–19)
AST SERPL-CCNC: 27 U/L (ref 5–40)
BILIRUB SERPL-MCNC: 0.9 MG/DL (ref 0.2–1.2)
BUN SERPL-MCNC: 29 MG/DL (ref 8–23)
CALCIUM SERPL-MCNC: 9.7 MG/DL (ref 8.8–10.2)
CHLORIDE SERPL-SCNC: 103 MMOL/L (ref 98–111)
CO2 SERPL-SCNC: 22 MMOL/L (ref 22–29)
CREAT SERPL-MCNC: 1.5 MG/DL (ref 0.5–1.2)
GLUCOSE SERPL-MCNC: 104 MG/DL (ref 74–109)
HBA1C MFR BLD: 6.1 % (ref 4–6)
POTASSIUM SERPL-SCNC: 4.4 MMOL/L (ref 3.5–5)
PROT SERPL-MCNC: 7.4 G/DL (ref 6.6–8.7)
SODIUM SERPL-SCNC: 141 MMOL/L (ref 136–145)

## 2023-09-26 PROCEDURE — G0008 ADMIN INFLUENZA VIRUS VAC: HCPCS | Performed by: NURSE PRACTITIONER

## 2023-09-26 PROCEDURE — 3044F HG A1C LEVEL LT 7.0%: CPT | Performed by: NURSE PRACTITIONER

## 2023-09-26 PROCEDURE — 3078F DIAST BP <80 MM HG: CPT | Performed by: NURSE PRACTITIONER

## 2023-09-26 PROCEDURE — 3074F SYST BP LT 130 MM HG: CPT | Performed by: NURSE PRACTITIONER

## 2023-09-26 PROCEDURE — G8420 CALC BMI NORM PARAMETERS: HCPCS | Performed by: NURSE PRACTITIONER

## 2023-09-26 PROCEDURE — 1036F TOBACCO NON-USER: CPT | Performed by: NURSE PRACTITIONER

## 2023-09-26 PROCEDURE — G8427 DOCREV CUR MEDS BY ELIG CLIN: HCPCS | Performed by: NURSE PRACTITIONER

## 2023-09-26 PROCEDURE — 1123F ACP DISCUSS/DSCN MKR DOCD: CPT | Performed by: NURSE PRACTITIONER

## 2023-09-26 PROCEDURE — 90694 VACC AIIV4 NO PRSRV 0.5ML IM: CPT | Performed by: NURSE PRACTITIONER

## 2023-09-26 PROCEDURE — 99213 OFFICE O/P EST LOW 20 MIN: CPT | Performed by: NURSE PRACTITIONER

## 2023-09-26 NOTE — PATIENT INSTRUCTIONS
Ask Dr Kana [place] if he can write the [address] [address] for sugar and kidney. Continue exercise  Let me know if swelling stays and doesn't go down or shortness of breath. F/u with kidney doctor.

## 2023-10-25 ENCOUNTER — ANCILLARY PROCEDURE (OUTPATIENT)
Dept: PRIMARY CARE CLINIC | Age: 78
End: 2023-10-25

## 2023-10-25 ENCOUNTER — OFFICE VISIT (OUTPATIENT)
Dept: PRIMARY CARE CLINIC | Age: 78
End: 2023-10-25
Payer: MEDICARE

## 2023-10-25 VITALS
TEMPERATURE: 97.4 F | BODY MASS INDEX: 23.78 KG/M2 | HEART RATE: 62 BPM | RESPIRATION RATE: 16 BRPM | HEIGHT: 73 IN | WEIGHT: 179.4 LBS | DIASTOLIC BLOOD PRESSURE: 76 MMHG | SYSTOLIC BLOOD PRESSURE: 120 MMHG | OXYGEN SATURATION: 98 %

## 2023-10-25 DIAGNOSIS — E11.9 TYPE 2 DIABETES MELLITUS WITHOUT COMPLICATION, WITHOUT LONG-TERM CURRENT USE OF INSULIN (HCC): ICD-10-CM

## 2023-10-25 DIAGNOSIS — R93.89 ABNORMAL CHEST X-RAY: ICD-10-CM

## 2023-10-25 DIAGNOSIS — Q76.7 CONGENITAL ABNORMALITY OF SHAPE OF STERNUM: Primary | ICD-10-CM

## 2023-10-25 PROCEDURE — 3044F HG A1C LEVEL LT 7.0%: CPT | Performed by: NURSE PRACTITIONER

## 2023-10-25 PROCEDURE — 1123F ACP DISCUSS/DSCN MKR DOCD: CPT | Performed by: NURSE PRACTITIONER

## 2023-10-25 PROCEDURE — 3078F DIAST BP <80 MM HG: CPT | Performed by: NURSE PRACTITIONER

## 2023-10-25 PROCEDURE — 3074F SYST BP LT 130 MM HG: CPT | Performed by: NURSE PRACTITIONER

## 2023-10-25 PROCEDURE — 99213 OFFICE O/P EST LOW 20 MIN: CPT | Performed by: NURSE PRACTITIONER

## 2023-10-25 PROCEDURE — G8484 FLU IMMUNIZE NO ADMIN: HCPCS | Performed by: NURSE PRACTITIONER

## 2023-10-25 PROCEDURE — 1036F TOBACCO NON-USER: CPT | Performed by: NURSE PRACTITIONER

## 2023-10-25 PROCEDURE — G8427 DOCREV CUR MEDS BY ELIG CLIN: HCPCS | Performed by: NURSE PRACTITIONER

## 2023-10-25 PROCEDURE — G8420 CALC BMI NORM PARAMETERS: HCPCS | Performed by: NURSE PRACTITIONER

## 2023-10-25 RX ORDER — ATORVASTATIN CALCIUM 10 MG/1
10 TABLET, FILM COATED ORAL DAILY
COMMUNITY

## 2023-11-06 ENCOUNTER — HOSPITAL ENCOUNTER (OUTPATIENT)
Dept: GENERAL RADIOLOGY | Age: 78
Discharge: HOME | End: 2023-11-06

## 2023-11-06 DIAGNOSIS — R93.89 ABNORMAL CHEST X-RAY: ICD-10-CM

## 2023-11-06 DIAGNOSIS — N28.9 DECREASED RENAL FUNCTION: ICD-10-CM

## 2023-11-06 DIAGNOSIS — N28.9 DECREASED RENAL FUNCTION: Primary | ICD-10-CM

## 2023-11-06 DIAGNOSIS — Q76.7 CONGENITAL ABNORMALITY OF SHAPE OF STERNUM: ICD-10-CM

## 2023-11-06 LAB
ANION GAP SERPL CALCULATED.3IONS-SCNC: 11 MMOL/L (ref 7–19)
BUN SERPL-MCNC: 35 MG/DL (ref 8–23)
CALCIUM SERPL-MCNC: 9.8 MG/DL (ref 8.8–10.2)
CHLORIDE SERPL-SCNC: 105 MMOL/L (ref 98–111)
CO2 SERPL-SCNC: 24 MMOL/L (ref 22–29)
CREAT SERPL-MCNC: 1.7 MG/DL (ref 0.5–1.2)
GLUCOSE SERPL-MCNC: 114 MG/DL (ref 74–109)
POTASSIUM SERPL-SCNC: 5 MMOL/L (ref 3.5–5)
SODIUM SERPL-SCNC: 140 MMOL/L (ref 136–145)

## 2023-11-10 DIAGNOSIS — E11.9 TYPE 2 DIABETES MELLITUS WITHOUT COMPLICATION, WITHOUT LONG-TERM CURRENT USE OF INSULIN (HCC): Primary | ICD-10-CM

## 2023-11-10 DIAGNOSIS — N18.31 STAGE 3A CHRONIC KIDNEY DISEASE (HCC): ICD-10-CM

## 2023-11-14 ENCOUNTER — NURSE ONLY (OUTPATIENT)
Dept: PRIMARY CARE CLINIC | Age: 78
End: 2023-11-14

## 2023-11-14 DIAGNOSIS — N18.31 STAGE 3A CHRONIC KIDNEY DISEASE (HCC): ICD-10-CM

## 2023-11-14 DIAGNOSIS — E11.9 TYPE 2 DIABETES MELLITUS WITHOUT COMPLICATION, WITHOUT LONG-TERM CURRENT USE OF INSULIN (HCC): ICD-10-CM

## 2023-11-14 LAB
ANION GAP SERPL CALCULATED.3IONS-SCNC: 12 MMOL/L (ref 7–19)
BUN SERPL-MCNC: 30 MG/DL (ref 8–23)
CALCIUM SERPL-MCNC: 9.8 MG/DL (ref 8.8–10.2)
CHLORIDE SERPL-SCNC: 103 MMOL/L (ref 98–111)
CO2 SERPL-SCNC: 25 MMOL/L (ref 22–29)
CREAT SERPL-MCNC: 1.3 MG/DL (ref 0.5–1.2)
GLUCOSE SERPL-MCNC: 116 MG/DL (ref 74–109)
POTASSIUM SERPL-SCNC: 4.7 MMOL/L (ref 3.5–5)
SODIUM SERPL-SCNC: 140 MMOL/L (ref 136–145)

## 2023-11-16 ENCOUNTER — HOSPITAL ENCOUNTER (OUTPATIENT)
Dept: GENERAL RADIOLOGY | Age: 78
Discharge: HOME | End: 2023-11-16
Payer: MEDICARE

## 2023-11-16 PROCEDURE — 6360000004 HC RX CONTRAST MEDICATION: Performed by: NURSE PRACTITIONER

## 2023-11-16 PROCEDURE — 71260 CT THORAX DX C+: CPT

## 2023-11-16 RX ADMIN — IOPAMIDOL 75 ML: 755 INJECTION, SOLUTION INTRAVENOUS at 09:35

## 2024-01-18 LAB
ALBUMIN SERPL-MCNC: 4.2 G/DL (ref 3.5–5.2)
ALP SERPL-CCNC: 85 U/L (ref 40–130)
ALT SERPL-CCNC: 18 U/L (ref 5–41)
ANION GAP SERPL CALCULATED.3IONS-SCNC: 9 MMOL/L (ref 7–19)
AST SERPL-CCNC: 23 U/L (ref 5–40)
BACTERIA URNS QL MICRO: NEGATIVE /HPF
BASOPHILS # BLD: 0 K/UL (ref 0–0.2)
BASOPHILS NFR BLD: 0.4 % (ref 0–1)
BILIRUB SERPL-MCNC: 0.7 MG/DL (ref 0.2–1.2)
BILIRUB UR QL STRIP: NEGATIVE
BUN SERPL-MCNC: 26 MG/DL (ref 8–23)
CALCIUM SERPL-MCNC: 9.8 MG/DL (ref 8.8–10.2)
CHLORIDE SERPL-SCNC: 104 MMOL/L (ref 98–111)
CLARITY UR: CLEAR
CO2 SERPL-SCNC: 28 MMOL/L (ref 22–29)
COLOR UR: YELLOW
CREAT SERPL-MCNC: 1.4 MG/DL (ref 0.5–1.2)
CRYSTALS URNS MICRO: ABNORMAL /HPF
EOSINOPHIL # BLD: 0.5 K/UL (ref 0–0.6)
EOSINOPHIL NFR BLD: 7.2 % (ref 0–5)
EPI CELLS #/AREA URNS AUTO: 0 /HPF (ref 0–5)
ERYTHROCYTE [DISTWIDTH] IN BLOOD BY AUTOMATED COUNT: 12.6 % (ref 11.5–14.5)
GLUCOSE SERPL-MCNC: 117 MG/DL (ref 74–109)
GLUCOSE UR STRIP.AUTO-MCNC: 500 MG/DL
HCT VFR BLD AUTO: 49.6 % (ref 42–52)
HGB BLD-MCNC: 16.1 G/DL (ref 14–18)
HGB UR STRIP.AUTO-MCNC: NEGATIVE MG/L
HYALINE CASTS #/AREA URNS AUTO: 1 /HPF (ref 0–8)
IMM GRANULOCYTES # BLD: 0 K/UL
KETONES UR STRIP.AUTO-MCNC: NEGATIVE MG/DL
LEUKOCYTE ESTERASE UR QL STRIP.AUTO: NEGATIVE
LYMPHOCYTES # BLD: 2.1 K/UL (ref 1.1–4.5)
LYMPHOCYTES NFR BLD: 28.8 % (ref 20–40)
MAGNESIUM SERPL-MCNC: 2.2 MG/DL (ref 1.6–2.4)
MCH RBC QN AUTO: 32 PG (ref 27–31)
MCHC RBC AUTO-ENTMCNC: 32.5 G/DL (ref 33–37)
MCV RBC AUTO: 98.6 FL (ref 80–94)
MONOCYTES # BLD: 0.7 K/UL (ref 0–0.9)
MONOCYTES NFR BLD: 9 % (ref 0–10)
NEUTROPHILS # BLD: 4 K/UL (ref 1.5–7.5)
NEUTS SEG NFR BLD: 54.5 % (ref 50–65)
NITRITE UR QL STRIP.AUTO: NEGATIVE
PH UR STRIP.AUTO: 5.5 [PH] (ref 5–8)
PLATELET # BLD AUTO: 161 K/UL (ref 130–400)
PMV BLD AUTO: 12.4 FL (ref 9.4–12.4)
POTASSIUM SERPL-SCNC: 4.4 MMOL/L (ref 3.5–5)
PROT SERPL-MCNC: 7.4 G/DL (ref 6.6–8.7)
PROT UR STRIP.AUTO-MCNC: 30 MG/DL
PTH-INTACT SERPL-MCNC: 35.5 PG/ML (ref 15–65)
RBC # BLD AUTO: 5.03 M/UL (ref 4.7–6.1)
RBC #/AREA URNS AUTO: 0 /HPF (ref 0–4)
SODIUM SERPL-SCNC: 141 MMOL/L (ref 136–145)
SP GR UR STRIP.AUTO: 1.01 (ref 1–1.03)
URATE SERPL-MCNC: 5.5 MG/DL (ref 3.4–7)
UROBILINOGEN UR STRIP.AUTO-MCNC: 0.2 E.U./DL
WBC # BLD AUTO: 7.3 K/UL (ref 4.8–10.8)
WBC #/AREA URNS AUTO: 0 /HPF (ref 0–5)

## 2024-03-21 ENCOUNTER — TELEPHONE (OUTPATIENT)
Dept: PRIMARY CARE CLINIC | Age: 79
End: 2024-03-21

## 2024-03-21 NOTE — TELEPHONE ENCOUNTER
Pt states he needs a letter stating he has Diabetes and exposure to Agent Orange and this caused his Kidney Disease. Pt is [facility]. Mail to Pt.

## 2024-12-26 ENCOUNTER — OFFICE VISIT (OUTPATIENT)
Dept: PRIMARY CARE CLINIC | Age: 79
End: 2024-12-26
Payer: MEDICARE

## 2024-12-26 VITALS
TEMPERATURE: 97.6 F | SYSTOLIC BLOOD PRESSURE: 132 MMHG | BODY MASS INDEX: 25.18 KG/M2 | HEIGHT: 73 IN | OXYGEN SATURATION: 97 % | DIASTOLIC BLOOD PRESSURE: 70 MMHG | WEIGHT: 190 LBS | HEART RATE: 57 BPM | RESPIRATION RATE: 16 BRPM

## 2024-12-26 DIAGNOSIS — Z00.00 MEDICARE ANNUAL WELLNESS VISIT, SUBSEQUENT: Primary | ICD-10-CM

## 2024-12-26 DIAGNOSIS — N18.31 STAGE 3A CHRONIC KIDNEY DISEASE (HCC): ICD-10-CM

## 2024-12-26 DIAGNOSIS — E11.9 TYPE 2 DIABETES MELLITUS WITHOUT COMPLICATION, WITHOUT LONG-TERM CURRENT USE OF INSULIN (HCC): ICD-10-CM

## 2024-12-26 PROBLEM — N18.30 CKD (CHRONIC KIDNEY DISEASE) STAGE 3, GFR 30-59 ML/MIN (HCC): Status: RESOLVED | Noted: 2017-10-05 | Resolved: 2024-12-26

## 2024-12-26 PROBLEM — N18.32 STAGE 3B CHRONIC KIDNEY DISEASE (HCC): Status: ACTIVE | Noted: 2017-10-05

## 2024-12-26 PROBLEM — E11.65 TYPE 2 DIABETES MELLITUS WITH HYPERGLYCEMIA, WITHOUT LONG-TERM CURRENT USE OF INSULIN (HCC): Status: ACTIVE | Noted: 2024-12-26

## 2024-12-26 PROCEDURE — G0439 PPPS, SUBSEQ VISIT: HCPCS | Performed by: NURSE PRACTITIONER

## 2024-12-26 PROCEDURE — 1159F MED LIST DOCD IN RCRD: CPT | Performed by: NURSE PRACTITIONER

## 2024-12-26 PROCEDURE — G8484 FLU IMMUNIZE NO ADMIN: HCPCS | Performed by: NURSE PRACTITIONER

## 2024-12-26 PROCEDURE — 3075F SYST BP GE 130 - 139MM HG: CPT | Performed by: NURSE PRACTITIONER

## 2024-12-26 PROCEDURE — 1160F RVW MEDS BY RX/DR IN RCRD: CPT | Performed by: NURSE PRACTITIONER

## 2024-12-26 PROCEDURE — 3078F DIAST BP <80 MM HG: CPT | Performed by: NURSE PRACTITIONER

## 2024-12-26 PROCEDURE — 1123F ACP DISCUSS/DSCN MKR DOCD: CPT | Performed by: NURSE PRACTITIONER

## 2024-12-26 SDOH — ECONOMIC STABILITY: FOOD INSECURITY: WITHIN THE PAST 12 MONTHS, THE FOOD YOU BOUGHT JUST DIDN'T LAST AND YOU DIDN'T HAVE MONEY TO GET MORE.: NEVER TRUE

## 2024-12-26 SDOH — HEALTH STABILITY: PHYSICAL HEALTH: ON AVERAGE, HOW MANY DAYS PER WEEK DO YOU ENGAGE IN MODERATE TO STRENUOUS EXERCISE (LIKE A BRISK WALK)?: 4 DAYS

## 2024-12-26 SDOH — HEALTH STABILITY: PHYSICAL HEALTH: ON AVERAGE, HOW MANY MINUTES DO YOU ENGAGE IN EXERCISE AT THIS LEVEL?: 150+ MIN

## 2024-12-26 SDOH — ECONOMIC STABILITY: INCOME INSECURITY: HOW HARD IS IT FOR YOU TO PAY FOR THE VERY BASICS LIKE FOOD, HOUSING, MEDICAL CARE, AND HEATING?: NOT HARD AT ALL

## 2024-12-26 SDOH — ECONOMIC STABILITY: FOOD INSECURITY: WITHIN THE PAST 12 MONTHS, YOU WORRIED THAT YOUR FOOD WOULD RUN OUT BEFORE YOU GOT MONEY TO BUY MORE.: NEVER TRUE

## 2024-12-26 ASSESSMENT — PATIENT HEALTH QUESTIONNAIRE - PHQ9
SUM OF ALL RESPONSES TO PHQ QUESTIONS 1-9: 0
2. FEELING DOWN, DEPRESSED OR HOPELESS: NOT AT ALL
SUM OF ALL RESPONSES TO PHQ QUESTIONS 1-9: 0
SUM OF ALL RESPONSES TO PHQ QUESTIONS 1-9: 0
SUM OF ALL RESPONSES TO PHQ9 QUESTIONS 1 & 2: 0
1. LITTLE INTEREST OR PLEASURE IN DOING THINGS: NOT AT ALL
SUM OF ALL RESPONSES TO PHQ QUESTIONS 1-9: 0

## 2024-12-26 ASSESSMENT — LIFESTYLE VARIABLES
HOW OFTEN DO YOU HAVE A DRINK CONTAINING ALCOHOL: 1
HOW MANY STANDARD DRINKS CONTAINING ALCOHOL DO YOU HAVE ON A TYPICAL DAY: 0
HOW OFTEN DO YOU HAVE A DRINK CONTAINING ALCOHOL: NEVER
HOW MANY STANDARD DRINKS CONTAINING ALCOHOL DO YOU HAVE ON A TYPICAL DAY: PATIENT DOES NOT DRINK
HOW OFTEN DO YOU HAVE SIX OR MORE DRINKS ON ONE OCCASION: 1

## 2024-12-26 NOTE — PROGRESS NOTES
Medicare Annual Wellness Visit    Earnest Malave is here for Medicare AWV (Patient presents today for MAW. He has not been fasting for labs.)    Assessment & Plan   Medicare annual wellness visit, subsequent  Type 2 diabetes mellitus without complication, without long-term current use of insulin (HCC)  Stage 3a chronic kidney disease (HCC)    Recommendations for Preventive Services Due: see orders and patient instructions/AVS.  Recommended screening schedule for the next 5-10 years is provided to the patient in written form: see Patient Instructions/AVS.     No follow-ups on file.     Subjective   The following acute and/or chronic problems were also addressed today:  [facility]. L[facility]pez at [facility].  He also sees Dr. Williamson for dermatology.  His last A1c was a little high at 7 this last month.  He is on the Jardiance 10 mg.    Patient's complete Health Risk Assessment and screening values have been reviewed and are found in Flowsheets. The following problems were reviewed today and where indicated follow up appointments were made and/or referrals ordered.    Positive Risk Factor Screenings with Interventions:               Poor Eating Habits/Diet:  Do you eat balanced/healthy meals regularly?: (!) No  Interventions:  Working on diet due to diabetes increase, cut out sweets and bread     Dentist Screen:  Have you seen the dentist within the past year?: (!) No    Intervention:  Advised to schedule with their dentist        Advanced Directives:  Do you have a Living Will?: (!) No    Intervention:  has NO advanced directive - information provided                   Objective   Vitals:    12/26/24 0810   BP: 132/70   Pulse: 57   Resp: 16   Temp: 97.6 °F (36.4 °C)   TempSrc: Temporal   SpO2: 97%   Weight: 86.2 kg (190 lb)   Height: 1.854 m (6' 1\")      Body mass index is 25.07 kg/m².      General Appearance: alert and oriented to person, place and time, well

## 2024-12-26 NOTE — PATIENT INSTRUCTIONS
Care Instructions.\"  Current as of: November 16, 2023  Content Version: 14.2  © 2024 [employer].   Care instructions adapted under license by [employer]. If you have questions about a medical condition or this instruction, always ask your healthcare professional. [employer] disclaims any warranty or liability for your use of this information.           A Healthy Heart: Care Instructions  Overview     Coronary artery disease, also called heart disease, occurs when a substance called plaque builds up in the vessels that supply oxygen-rich blood to your heart muscle. This can narrow the blood vessels and reduce blood flow. A heart attack happens when blood flow is completely blocked. A high-fat diet, smoking, and other factors increase the risk of heart disease.  Your doctor has found that you have a chance of having heart disease. A heart-healthy lifestyle can help keep your heart healthy and prevent heart disease. This lifestyle includes eating healthy, being active, staying at a weight that's healthy for you, and not smoking or using tobacco. It also includes taking medicines as directed, managing other health conditions, and trying to get a healthy amount of sleep.  Follow-up care is a key part of your treatment and safety. Be sure to make and go to all appointments, and call your doctor if you are having problems. It's also a good idea to know your test results and keep a list of the medicines you take.  How can you care for yourself at home?  Diet    Use less salt when you cook and eat. This helps lower your blood pressure. Taste food before salting. Add only a little salt when you think you need it. With time, your taste buds will adjust to less salt.     Eat fewer snack items, fast foods, canned soups, and other high-salt, high-fat, processed foods.     Read food labels and try to avoid saturated and trans fats. They increase your risk of heart disease by raising cholesterol levels.

## 2025-08-19 ENCOUNTER — TRANSCRIBE ORDERS (OUTPATIENT)
Dept: ADMINISTRATIVE | Facility: HOSPITAL | Age: 80
End: 2025-08-19
Payer: MEDICARE

## 2025-08-19 ENCOUNTER — HOSPITAL ENCOUNTER (OUTPATIENT)
Dept: GENERAL RADIOLOGY | Facility: HOSPITAL | Age: 80
Discharge: HOME | End: 2025-08-19
Admitting: NURSE PRACTITIONER
Payer: MEDICARE

## 2025-08-19 DIAGNOSIS — M53.87 OTHER SPECIFIED DORSOPATHIES, LUMBOSACRAL REGION: ICD-10-CM

## 2025-08-19 DIAGNOSIS — M54.59 OTHER LOW BACK PAIN: ICD-10-CM

## 2025-08-19 DIAGNOSIS — M54.59 OTHER LOW BACK PAIN: Primary | ICD-10-CM

## 2025-08-19 PROCEDURE — 72110 X-RAY EXAM L-2 SPINE 4/>VWS: CPT

## 2025-08-25 ENCOUNTER — APPOINTMENT (OUTPATIENT)
Dept: CT IMAGING | Facility: HOSPITAL | Age: 80
End: 2025-08-25
Payer: MEDICARE

## 2025-08-25 ENCOUNTER — APPOINTMENT (OUTPATIENT)
Dept: GENERAL RADIOLOGY | Facility: HOSPITAL | Age: 80
End: 2025-08-25
Payer: MEDICARE

## 2025-08-25 ENCOUNTER — HOSPITAL ENCOUNTER (OUTPATIENT)
Facility: HOSPITAL | Age: 80
Setting detail: OBSERVATION
Discharge: HOME | End: 2025-08-27
Attending: FAMILY MEDICINE | Admitting: FAMILY MEDICINE
Payer: MEDICARE

## 2025-08-25 DIAGNOSIS — N17.9 AKI (ACUTE KIDNEY INJURY): Primary | ICD-10-CM

## 2025-08-25 DIAGNOSIS — Z74.09 IMPAIRED FUNCTIONAL MOBILITY AND ACTIVITY TOLERANCE: ICD-10-CM

## 2025-08-25 DIAGNOSIS — R55 SYNCOPE AND COLLAPSE: ICD-10-CM

## 2025-08-25 DIAGNOSIS — R55 SYNCOPE, UNSPECIFIED SYNCOPE TYPE: ICD-10-CM

## 2025-08-25 DIAGNOSIS — S02.2XXA CLOSED FRACTURE OF NASAL BONE, INITIAL ENCOUNTER: ICD-10-CM

## 2025-08-25 PROBLEM — I48.91 ATRIAL FIBRILLATION: Status: ACTIVE | Noted: 2025-08-25

## 2025-08-25 LAB
ALBUMIN SERPL-MCNC: 4.1 G/DL (ref 3.5–5.2)
ALBUMIN/GLOB SERPL: 1.3 G/DL
ALP SERPL-CCNC: 81 U/L (ref 39–117)
ALT SERPL W P-5'-P-CCNC: 31 U/L (ref 1–41)
ANION GAP SERPL CALCULATED.3IONS-SCNC: 12 MMOL/L (ref 5–15)
ANION GAP SERPL CALCULATED.3IONS-SCNC: 14 MMOL/L (ref 5–15)
AST SERPL-CCNC: 28 U/L (ref 1–40)
BASOPHILS # BLD AUTO: 0.02 10*3/MM3 (ref 0–0.2)
BASOPHILS NFR BLD AUTO: 0.1 % (ref 0–1.5)
BILIRUB SERPL-MCNC: 0.7 MG/DL (ref 0–1.2)
BILIRUB UR QL STRIP: NEGATIVE
BUN SERPL-MCNC: 56.3 MG/DL (ref 8–23)
BUN SERPL-MCNC: 56.3 MG/DL (ref 8–23)
BUN/CREAT SERPL: 26.7 (ref 7–25)
BUN/CREAT SERPL: 27.6 (ref 7–25)
CALCIUM SPEC-SCNC: 9.3 MG/DL (ref 8.6–10.5)
CALCIUM SPEC-SCNC: 9.6 MG/DL (ref 8.6–10.5)
CHLORIDE SERPL-SCNC: 103 MMOL/L (ref 98–107)
CHLORIDE SERPL-SCNC: 103 MMOL/L (ref 98–107)
CLARITY UR: CLEAR
CO2 SERPL-SCNC: 19 MMOL/L (ref 22–29)
CO2 SERPL-SCNC: 21 MMOL/L (ref 22–29)
COLOR UR: YELLOW
CREAT SERPL-MCNC: 2.04 MG/DL (ref 0.76–1.27)
CREAT SERPL-MCNC: 2.11 MG/DL (ref 0.76–1.27)
DEPRECATED RDW RBC AUTO: 46.2 FL (ref 37–54)
EGFRCR SERPLBLD CKD-EPI 2021: 31.4 ML/MIN/1.73
EGFRCR SERPLBLD CKD-EPI 2021: 32.7 ML/MIN/1.73
EOSINOPHIL # BLD AUTO: 0.01 10*3/MM3 (ref 0–0.4)
EOSINOPHIL NFR BLD AUTO: 0.1 % (ref 0.3–6.2)
ERYTHROCYTE [DISTWIDTH] IN BLOOD BY AUTOMATED COUNT: 12.8 % (ref 12.3–15.4)
GEN 5 1HR TROPONIN T REFLEX: 20 NG/L
GLOBULIN UR ELPH-MCNC: 3.2 GM/DL
GLUCOSE SERPL-MCNC: 159 MG/DL (ref 65–99)
GLUCOSE SERPL-MCNC: 163 MG/DL (ref 65–99)
GLUCOSE UR STRIP-MCNC: ABNORMAL MG/DL
HCT VFR BLD AUTO: 48.7 % (ref 37.5–51)
HGB BLD-MCNC: 16.5 G/DL (ref 13–17.7)
HGB UR QL STRIP.AUTO: NEGATIVE
IMM GRANULOCYTES # BLD AUTO: 0.06 10*3/MM3 (ref 0–0.05)
IMM GRANULOCYTES NFR BLD AUTO: 0.4 % (ref 0–0.5)
INR PPP: 1.16 (ref 0.91–1.09)
KETONES UR QL STRIP: NEGATIVE
LEUKOCYTE ESTERASE UR QL STRIP.AUTO: NEGATIVE
LYMPHOCYTES # BLD AUTO: 1.27 10*3/MM3 (ref 0.7–3.1)
LYMPHOCYTES NFR BLD AUTO: 9.1 % (ref 19.6–45.3)
MCH RBC QN AUTO: 33.1 PG (ref 26.6–33)
MCHC RBC AUTO-ENTMCNC: 33.9 G/DL (ref 31.5–35.7)
MCV RBC AUTO: 97.8 FL (ref 79–97)
MONOCYTES # BLD AUTO: 1.23 10*3/MM3 (ref 0.1–0.9)
MONOCYTES NFR BLD AUTO: 8.8 % (ref 5–12)
NEUTROPHILS NFR BLD AUTO: 11.37 10*3/MM3 (ref 1.7–7)
NEUTROPHILS NFR BLD AUTO: 81.5 % (ref 42.7–76)
NITRITE UR QL STRIP: NEGATIVE
NRBC BLD AUTO-RTO: 0 /100 WBC (ref 0–0.2)
NT-PROBNP SERPL-MCNC: 542.6 PG/ML (ref 0–1800)
PH UR STRIP.AUTO: 5.5 [PH] (ref 5–8)
PLATELET # BLD AUTO: 187 10*3/MM3 (ref 140–450)
PMV BLD AUTO: 11.2 FL (ref 6–12)
POTASSIUM SERPL-SCNC: 4.7 MMOL/L (ref 3.5–5.2)
POTASSIUM SERPL-SCNC: 5.6 MMOL/L (ref 3.5–5.2)
PROT SERPL-MCNC: 7.3 G/DL (ref 6–8.5)
PROT UR QL STRIP: ABNORMAL
PROTHROMBIN TIME: 15.4 SECONDS (ref 11.8–14.8)
RBC # BLD AUTO: 4.98 10*6/MM3 (ref 4.14–5.8)
SODIUM SERPL-SCNC: 136 MMOL/L (ref 136–145)
SODIUM SERPL-SCNC: 136 MMOL/L (ref 136–145)
SP GR UR STRIP: 1.02 (ref 1–1.03)
TROPONIN T % DELTA: -13
TROPONIN T NUMERIC DELTA: -3 NG/L
TROPONIN T SERPL HS-MCNC: 23 NG/L
UROBILINOGEN UR QL STRIP: ABNORMAL
WBC NRBC COR # BLD AUTO: 13.96 10*3/MM3 (ref 3.4–10.8)

## 2025-08-25 PROCEDURE — 71045 X-RAY EXAM CHEST 1 VIEW: CPT

## 2025-08-25 PROCEDURE — 70486 CT MAXILLOFACIAL W/O DYE: CPT

## 2025-08-25 PROCEDURE — 96372 THER/PROPH/DIAG INJ SC/IM: CPT

## 2025-08-25 PROCEDURE — G0378 HOSPITAL OBSERVATION PER HR: HCPCS

## 2025-08-25 PROCEDURE — 70450 CT HEAD/BRAIN W/O DYE: CPT

## 2025-08-25 PROCEDURE — 96360 HYDRATION IV INFUSION INIT: CPT

## 2025-08-25 PROCEDURE — 85025 COMPLETE CBC W/AUTO DIFF WBC: CPT | Performed by: PHYSICIAN ASSISTANT

## 2025-08-25 PROCEDURE — 81003 URINALYSIS AUTO W/O SCOPE: CPT | Performed by: PHYSICIAN ASSISTANT

## 2025-08-25 PROCEDURE — 80053 COMPREHEN METABOLIC PANEL: CPT | Performed by: PHYSICIAN ASSISTANT

## 2025-08-25 PROCEDURE — 85610 PROTHROMBIN TIME: CPT | Performed by: FAMILY MEDICINE

## 2025-08-25 PROCEDURE — 25810000003 SODIUM CHLORIDE 0.9 % SOLUTION: Performed by: NURSE PRACTITIONER

## 2025-08-25 PROCEDURE — 25010000002 ENOXAPARIN PER 10 MG: Performed by: FAMILY MEDICINE

## 2025-08-25 PROCEDURE — 84484 ASSAY OF TROPONIN QUANT: CPT | Performed by: NURSE PRACTITIONER

## 2025-08-25 PROCEDURE — 99285 EMERGENCY DEPT VISIT HI MDM: CPT

## 2025-08-25 PROCEDURE — 93005 ELECTROCARDIOGRAM TRACING: CPT | Performed by: PHYSICIAN ASSISTANT

## 2025-08-25 PROCEDURE — 36415 COLL VENOUS BLD VENIPUNCTURE: CPT

## 2025-08-25 PROCEDURE — 25810000003 SODIUM CHLORIDE 0.9 % SOLUTION: Performed by: FAMILY MEDICINE

## 2025-08-25 PROCEDURE — 72131 CT LUMBAR SPINE W/O DYE: CPT

## 2025-08-25 PROCEDURE — 83880 ASSAY OF NATRIURETIC PEPTIDE: CPT | Performed by: NURSE PRACTITIONER

## 2025-08-25 RX ORDER — SODIUM CHLORIDE 0.9 % (FLUSH) 0.9 %
10 SYRINGE (ML) INJECTION AS NEEDED
Status: DISCONTINUED | OUTPATIENT
Start: 2025-08-25 | End: 2025-08-27 | Stop reason: HOSPADM

## 2025-08-25 RX ORDER — TAMSULOSIN HYDROCHLORIDE 0.4 MG/1
0.4 CAPSULE ORAL DAILY
Status: DISCONTINUED | OUTPATIENT
Start: 2025-08-25 | End: 2025-08-27 | Stop reason: HOSPADM

## 2025-08-25 RX ORDER — METHYLPREDNISOLONE 4 MG/1
TABLET ORAL 2 TIMES DAILY
COMMUNITY
Start: 2025-08-19 | End: 2025-08-25

## 2025-08-25 RX ORDER — ORPHENADRINE CITRATE 100 MG/1
100 TABLET ORAL 3 TIMES DAILY
COMMUNITY
End: 2025-08-27 | Stop reason: HOSPADM

## 2025-08-25 RX ORDER — BISACODYL 10 MG
10 SUPPOSITORY, RECTAL RECTAL DAILY PRN
Status: DISCONTINUED | OUTPATIENT
Start: 2025-08-25 | End: 2025-08-27 | Stop reason: HOSPADM

## 2025-08-25 RX ORDER — ATORVASTATIN CALCIUM 80 MG/1
40 TABLET, FILM COATED ORAL EVERY MORNING
COMMUNITY

## 2025-08-25 RX ORDER — ENOXAPARIN SODIUM 100 MG/ML
1 INJECTION SUBCUTANEOUS ONCE
Status: COMPLETED | OUTPATIENT
Start: 2025-08-25 | End: 2025-08-25

## 2025-08-25 RX ORDER — TAMSULOSIN HYDROCHLORIDE 0.4 MG/1
1 CAPSULE ORAL DAILY
COMMUNITY

## 2025-08-25 RX ORDER — SODIUM CHLORIDE 0.9 % (FLUSH) 0.9 %
10 SYRINGE (ML) INJECTION EVERY 12 HOURS SCHEDULED
Status: DISCONTINUED | OUTPATIENT
Start: 2025-08-25 | End: 2025-08-27 | Stop reason: HOSPADM

## 2025-08-25 RX ORDER — ASPIRIN 81 MG/1
81 TABLET ORAL DAILY
Status: DISCONTINUED | OUTPATIENT
Start: 2025-08-25 | End: 2025-08-27 | Stop reason: HOSPADM

## 2025-08-25 RX ORDER — SODIUM CHLORIDE 9 MG/ML
150 INJECTION, SOLUTION INTRAVENOUS CONTINUOUS
Status: DISCONTINUED | OUTPATIENT
Start: 2025-08-25 | End: 2025-08-25

## 2025-08-25 RX ORDER — ENOXAPARIN SODIUM 100 MG/ML
1 INJECTION SUBCUTANEOUS EVERY 12 HOURS
Status: DISCONTINUED | OUTPATIENT
Start: 2025-08-26 | End: 2025-08-26

## 2025-08-25 RX ORDER — POLYETHYLENE GLYCOL 3350 17 G/17G
17 POWDER, FOR SOLUTION ORAL DAILY PRN
Status: DISCONTINUED | OUTPATIENT
Start: 2025-08-25 | End: 2025-08-27 | Stop reason: HOSPADM

## 2025-08-25 RX ORDER — AMOXICILLIN 250 MG
2 CAPSULE ORAL 2 TIMES DAILY PRN
Status: DISCONTINUED | OUTPATIENT
Start: 2025-08-25 | End: 2025-08-27 | Stop reason: HOSPADM

## 2025-08-25 RX ORDER — SODIUM CHLORIDE 9 MG/ML
40 INJECTION, SOLUTION INTRAVENOUS AS NEEDED
Status: DISCONTINUED | OUTPATIENT
Start: 2025-08-25 | End: 2025-08-27 | Stop reason: HOSPADM

## 2025-08-25 RX ORDER — BISACODYL 5 MG/1
5 TABLET, DELAYED RELEASE ORAL DAILY PRN
Status: DISCONTINUED | OUTPATIENT
Start: 2025-08-25 | End: 2025-08-27 | Stop reason: HOSPADM

## 2025-08-25 RX ORDER — SODIUM CHLORIDE 9 MG/ML
125 INJECTION, SOLUTION INTRAVENOUS CONTINUOUS
Status: DISPENSED | OUTPATIENT
Start: 2025-08-25 | End: 2025-08-26

## 2025-08-25 RX ORDER — AMLODIPINE BESYLATE 10 MG/1
10 TABLET ORAL
Status: DISCONTINUED | OUTPATIENT
Start: 2025-08-25 | End: 2025-08-27 | Stop reason: HOSPADM

## 2025-08-25 RX ADMIN — SODIUM CHLORIDE 1000 ML: 9 INJECTION, SOLUTION INTRAVENOUS at 15:33

## 2025-08-25 RX ADMIN — ENOXAPARIN SODIUM 80 MG: 100 INJECTION SUBCUTANEOUS at 18:26

## 2025-08-25 RX ADMIN — SODIUM CHLORIDE 150 ML/HR: 9 INJECTION, SOLUTION INTRAVENOUS at 18:26

## 2025-08-25 RX ADMIN — TAMSULOSIN HYDROCHLORIDE 0.4 MG: 0.4 CAPSULE ORAL at 21:13

## 2025-08-25 RX ADMIN — Medication 10 ML: at 21:12

## 2025-08-25 RX ADMIN — ASPIRIN 81 MG: 81 TABLET, COATED ORAL at 21:13

## 2025-08-25 RX ADMIN — SODIUM CHLORIDE 125 ML/HR: 9 INJECTION, SOLUTION INTRAVENOUS at 21:13

## 2025-08-26 ENCOUNTER — APPOINTMENT (OUTPATIENT)
Dept: ULTRASOUND IMAGING | Facility: HOSPITAL | Age: 80
End: 2025-08-26
Payer: MEDICARE

## 2025-08-26 ENCOUNTER — APPOINTMENT (OUTPATIENT)
Dept: CARDIOLOGY | Facility: HOSPITAL | Age: 80
End: 2025-08-26
Payer: MEDICARE

## 2025-08-26 LAB
ALBUMIN SERPL-MCNC: 3.4 G/DL (ref 3.5–5.2)
ALBUMIN/GLOB SERPL: 1.3 G/DL
ALP SERPL-CCNC: 70 U/L (ref 39–117)
ALT SERPL W P-5'-P-CCNC: 27 U/L (ref 1–41)
ANION GAP SERPL CALCULATED.3IONS-SCNC: 9 MMOL/L (ref 5–15)
AORTIC DIMENSIONLESS INDEX: 1 (DI)
AST SERPL-CCNC: 19 U/L (ref 1–40)
AV MEAN PRESS GRAD SYS DOP V1V2: 2.5 MMHG
AV VMAX SYS DOP: 120 CM/SEC
BASOPHILS # BLD AUTO: 0.02 10*3/MM3 (ref 0–0.2)
BASOPHILS NFR BLD AUTO: 0.2 % (ref 0–1.5)
BH CV ECHO MEAS - AI P1/2T: 674 MSEC
BH CV ECHO MEAS - AO MAX PG: 5.8 MMHG
BH CV ECHO MEAS - AO V2 VTI: 16 CM
BH CV ECHO MEAS - AVA(I,D): 4.9 CM2
BH CV ECHO MEAS - EDV(MOD-SP2): 71.3 ML
BH CV ECHO MEAS - EDV(MOD-SP4): 91.9 ML
BH CV ECHO MEAS - EF(MOD-SP2): 64.4 %
BH CV ECHO MEAS - EF(MOD-SP4): 65.2 %
BH CV ECHO MEAS - ESV(MOD-SP2): 25.4 ML
BH CV ECHO MEAS - ESV(MOD-SP4): 32 ML
BH CV ECHO MEAS - LA DIMENSION: 4.1 CM
BH CV ECHO MEAS - LAT PEAK E' VEL: 24.4 CM/SEC
BH CV ECHO MEAS - LV DIASTOLIC VOL/BSA (35-75): 46.3 CM2
BH CV ECHO MEAS - LV MAX PG: 3.1 MMHG
BH CV ECHO MEAS - LV MEAN PG: 2 MMHG
BH CV ECHO MEAS - LV SYSTOLIC VOL/BSA (12-30): 16.1 CM2
BH CV ECHO MEAS - LV V1 MAX: 88.6 CM/SEC
BH CV ECHO MEAS - LV V1 VTI: 15.9 CM
BH CV ECHO MEAS - LVOT AREA: 4.9 CM2
BH CV ECHO MEAS - LVOT DIAM: 2.5 CM
BH CV ECHO MEAS - MED PEAK E' VEL: 13.8 CM/SEC
BH CV ECHO MEAS - MR MAX PG: 127.9 MMHG
BH CV ECHO MEAS - MR MAX VEL: 565.2 CM/SEC
BH CV ECHO MEAS - MV DEC SLOPE: 477 CM/SEC2
BH CV ECHO MEAS - MV E MAX VEL: 98 CM/SEC
BH CV ECHO MEAS - MV P1/2T: 78.6 MSEC
BH CV ECHO MEAS - MVA(P1/2T): 2.8 CM2
BH CV ECHO MEAS - PA V2 MAX: 75.1 CM/SEC
BH CV ECHO MEAS - RAP SYSTOLE: 3 MMHG
BH CV ECHO MEAS - RV MAX PG: 1.35 MMHG
BH CV ECHO MEAS - RV V1 MAX: 58.2 CM/SEC
BH CV ECHO MEAS - RVSP: 23.4 MMHG
BH CV ECHO MEAS - SV(LVOT): 78 ML
BH CV ECHO MEAS - SV(MOD-SP2): 45.9 ML
BH CV ECHO MEAS - SV(MOD-SP4): 59.9 ML
BH CV ECHO MEAS - SVI(LVOT): 39.4 ML/M2
BH CV ECHO MEAS - SVI(MOD-SP2): 23.1 ML/M2
BH CV ECHO MEAS - SVI(MOD-SP4): 30.2 ML/M2
BH CV ECHO MEAS - TAPSE (>1.6): 1.87 CM
BH CV ECHO MEAS - TR MAX PG: 20.4 MMHG
BH CV ECHO MEAS - TR MAX VEL: 226 CM/SEC
BH CV ECHO MEASUREMENTS AVERAGE E/E' RATIO: 5.13
BH CV XLRA - RV BASE: 3.8 CM
BILIRUB SERPL-MCNC: 0.5 MG/DL (ref 0–1.2)
BUN SERPL-MCNC: 44.7 MG/DL (ref 8–23)
BUN/CREAT SERPL: 30.4 (ref 7–25)
CALCIUM SPEC-SCNC: 8.6 MG/DL (ref 8.6–10.5)
CHLORIDE SERPL-SCNC: 111 MMOL/L (ref 98–107)
CHOLEST SERPL-MCNC: 113 MG/DL (ref 0–200)
CO2 SERPL-SCNC: 19 MMOL/L (ref 22–29)
CREAT SERPL-MCNC: 1.47 MG/DL (ref 0.76–1.27)
DEPRECATED RDW RBC AUTO: 45.4 FL (ref 37–54)
EGFRCR SERPLBLD CKD-EPI 2021: 48.5 ML/MIN/1.73
EOSINOPHIL # BLD AUTO: 0.04 10*3/MM3 (ref 0–0.4)
EOSINOPHIL NFR BLD AUTO: 0.5 % (ref 0.3–6.2)
ERYTHROCYTE [DISTWIDTH] IN BLOOD BY AUTOMATED COUNT: 12.8 % (ref 12.3–15.4)
GLOBULIN UR ELPH-MCNC: 2.7 GM/DL
GLUCOSE SERPL-MCNC: 127 MG/DL (ref 65–99)
HBA1C MFR BLD: 6.8 % (ref 4.8–5.6)
HCT VFR BLD AUTO: 44.1 % (ref 37.5–51)
HDLC SERPL-MCNC: 44 MG/DL (ref 40–60)
HGB BLD-MCNC: 14.8 G/DL (ref 13–17.7)
IMM GRANULOCYTES # BLD AUTO: 0.05 10*3/MM3 (ref 0–0.05)
IMM GRANULOCYTES NFR BLD AUTO: 0.6 % (ref 0–0.5)
LDLC SERPL CALC-MCNC: 45 MG/DL (ref 0–100)
LDLC/HDLC SERPL: 0.94 {RATIO}
LEFT ATRIUM VOLUME INDEX: 35.4 ML/M2
LEFT ATRIUM VOLUME: 70 ML
LV EF BIPLANE MOD: 66.7 %
LYMPHOCYTES # BLD AUTO: 1.58 10*3/MM3 (ref 0.7–3.1)
LYMPHOCYTES NFR BLD AUTO: 19.6 % (ref 19.6–45.3)
MCH RBC QN AUTO: 32.2 PG (ref 26.6–33)
MCHC RBC AUTO-ENTMCNC: 33.6 G/DL (ref 31.5–35.7)
MCV RBC AUTO: 96.1 FL (ref 79–97)
MONOCYTES # BLD AUTO: 0.86 10*3/MM3 (ref 0.1–0.9)
MONOCYTES NFR BLD AUTO: 10.7 % (ref 5–12)
NEUTROPHILS NFR BLD AUTO: 5.5 10*3/MM3 (ref 1.7–7)
NEUTROPHILS NFR BLD AUTO: 68.4 % (ref 42.7–76)
NRBC BLD AUTO-RTO: 0 /100 WBC (ref 0–0.2)
PLATELET # BLD AUTO: 176 10*3/MM3 (ref 140–450)
PMV BLD AUTO: 11.4 FL (ref 6–12)
POTASSIUM SERPL-SCNC: 4.6 MMOL/L (ref 3.5–5.2)
PROT SERPL-MCNC: 6.1 G/DL (ref 6–8.5)
RBC # BLD AUTO: 4.59 10*6/MM3 (ref 4.14–5.8)
SINUS: 3.5 CM
SODIUM SERPL-SCNC: 139 MMOL/L (ref 136–145)
STJ: 3.3 CM
TRIGL SERPL-MCNC: 139 MG/DL (ref 0–150)
TSH SERPL DL<=0.05 MIU/L-ACNC: 3.13 UIU/ML (ref 0.27–4.2)
VLDLC SERPL-MCNC: 24 MG/DL (ref 5–40)
WBC NRBC COR # BLD AUTO: 8.05 10*3/MM3 (ref 3.4–10.8)

## 2025-08-26 PROCEDURE — 97166 OT EVAL MOD COMPLEX 45 MIN: CPT

## 2025-08-26 PROCEDURE — 83036 HEMOGLOBIN GLYCOSYLATED A1C: CPT | Performed by: FAMILY MEDICINE

## 2025-08-26 PROCEDURE — G0378 HOSPITAL OBSERVATION PER HR: HCPCS

## 2025-08-26 PROCEDURE — 85025 COMPLETE CBC W/AUTO DIFF WBC: CPT | Performed by: FAMILY MEDICINE

## 2025-08-26 PROCEDURE — 84443 ASSAY THYROID STIM HORMONE: CPT | Performed by: FAMILY MEDICINE

## 2025-08-26 PROCEDURE — 25010000002 ENOXAPARIN PER 10 MG: Performed by: FAMILY MEDICINE

## 2025-08-26 PROCEDURE — 80053 COMPREHEN METABOLIC PANEL: CPT | Performed by: FAMILY MEDICINE

## 2025-08-26 PROCEDURE — 80061 LIPID PANEL: CPT | Performed by: FAMILY MEDICINE

## 2025-08-26 PROCEDURE — 97161 PT EVAL LOW COMPLEX 20 MIN: CPT

## 2025-08-26 PROCEDURE — 76775 US EXAM ABDO BACK WALL LIM: CPT

## 2025-08-26 PROCEDURE — 93306 TTE W/DOPPLER COMPLETE: CPT

## 2025-08-26 RX ORDER — SODIUM BICARBONATE 650 MG/1
650 TABLET ORAL 3 TIMES DAILY
Status: DISCONTINUED | OUTPATIENT
Start: 2025-08-26 | End: 2025-08-27 | Stop reason: HOSPADM

## 2025-08-26 RX ORDER — DILTIAZEM HYDROCHLORIDE 30 MG/1
60 TABLET, FILM COATED ORAL EVERY 6 HOURS SCHEDULED
Status: DISCONTINUED | OUTPATIENT
Start: 2025-08-26 | End: 2025-08-26

## 2025-08-26 RX ORDER — SODIUM BICARBONATE 650 MG/1
650 TABLET ORAL 2 TIMES DAILY
Status: DISCONTINUED | OUTPATIENT
Start: 2025-08-26 | End: 2025-08-26

## 2025-08-26 RX ORDER — DILTIAZEM HYDROCHLORIDE 30 MG/1
30 TABLET, FILM COATED ORAL EVERY 6 HOURS SCHEDULED
Status: DISCONTINUED | OUTPATIENT
Start: 2025-08-26 | End: 2025-08-27 | Stop reason: HOSPADM

## 2025-08-26 RX ADMIN — TAMSULOSIN HYDROCHLORIDE 0.4 MG: 0.4 CAPSULE ORAL at 10:32

## 2025-08-26 RX ADMIN — ENOXAPARIN SODIUM 80 MG: 100 INJECTION SUBCUTANEOUS at 05:52

## 2025-08-26 RX ADMIN — SODIUM BICARBONATE 650 MG: 650 TABLET ORAL at 10:32

## 2025-08-26 RX ADMIN — SODIUM BICARBONATE 650 MG: 650 TABLET ORAL at 20:34

## 2025-08-26 RX ADMIN — DILTIAZEM HYDROCHLORIDE 30 MG: 30 TABLET, FILM COATED ORAL at 17:17

## 2025-08-26 RX ADMIN — DILTIAZEM HYDROCHLORIDE 30 MG: 30 TABLET, FILM COATED ORAL at 14:56

## 2025-08-26 RX ADMIN — APIXABAN 5 MG: 5 TABLET, FILM COATED ORAL at 17:17

## 2025-08-26 RX ADMIN — Medication 10 ML: at 20:34

## 2025-08-26 RX ADMIN — Medication 10 ML: at 10:32

## 2025-08-26 RX ADMIN — ASPIRIN 81 MG: 81 TABLET, COATED ORAL at 10:32

## 2025-08-26 RX ADMIN — SODIUM BICARBONATE 650 MG: 650 TABLET ORAL at 17:16

## 2025-08-27 ENCOUNTER — TELEPHONE (OUTPATIENT)
Dept: CARDIOLOGY | Facility: CLINIC | Age: 80
End: 2025-08-27
Payer: MEDICARE

## 2025-08-27 ENCOUNTER — APPOINTMENT (OUTPATIENT)
Dept: CARDIOLOGY | Facility: HOSPITAL | Age: 80
End: 2025-08-27
Payer: MEDICARE

## 2025-08-27 VITALS
HEIGHT: 72 IN | OXYGEN SATURATION: 96 % | RESPIRATION RATE: 18 BRPM | WEIGHT: 166 LBS | HEART RATE: 77 BPM | BODY MASS INDEX: 22.48 KG/M2 | DIASTOLIC BLOOD PRESSURE: 70 MMHG | TEMPERATURE: 97.4 F | SYSTOLIC BLOOD PRESSURE: 108 MMHG

## 2025-08-27 LAB
ANION GAP SERPL CALCULATED.3IONS-SCNC: 10 MMOL/L (ref 5–15)
BUN SERPL-MCNC: 33.7 MG/DL (ref 8–23)
BUN/CREAT SERPL: 21.3 (ref 7–25)
CALCIUM SPEC-SCNC: 8.8 MG/DL (ref 8.6–10.5)
CHLORIDE SERPL-SCNC: 108 MMOL/L (ref 98–107)
CO2 SERPL-SCNC: 22 MMOL/L (ref 22–29)
CREAT SERPL-MCNC: 1.58 MG/DL (ref 0.76–1.27)
DEPRECATED RDW RBC AUTO: 46.2 FL (ref 37–54)
EGFRCR SERPLBLD CKD-EPI 2021: 44.5 ML/MIN/1.73
ERYTHROCYTE [DISTWIDTH] IN BLOOD BY AUTOMATED COUNT: 12.9 % (ref 12.3–15.4)
GLUCOSE SERPL-MCNC: 128 MG/DL (ref 65–99)
HCT VFR BLD AUTO: 47 % (ref 37.5–51)
HGB BLD-MCNC: 15.6 G/DL (ref 13–17.7)
MCH RBC QN AUTO: 32 PG (ref 26.6–33)
MCHC RBC AUTO-ENTMCNC: 33.2 G/DL (ref 31.5–35.7)
MCV RBC AUTO: 96.3 FL (ref 79–97)
PLATELET # BLD AUTO: 187 10*3/MM3 (ref 140–450)
PMV BLD AUTO: 11.3 FL (ref 6–12)
POTASSIUM SERPL-SCNC: 4.8 MMOL/L (ref 3.5–5.2)
RBC # BLD AUTO: 4.88 10*6/MM3 (ref 4.14–5.8)
SODIUM SERPL-SCNC: 140 MMOL/L (ref 136–145)
WBC NRBC COR # BLD AUTO: 8.73 10*3/MM3 (ref 3.4–10.8)

## 2025-08-27 PROCEDURE — 80048 BASIC METABOLIC PNL TOTAL CA: CPT | Performed by: FAMILY MEDICINE

## 2025-08-27 PROCEDURE — G0378 HOSPITAL OBSERVATION PER HR: HCPCS

## 2025-08-27 PROCEDURE — 85027 COMPLETE CBC AUTOMATED: CPT | Performed by: FAMILY MEDICINE

## 2025-08-27 RX ORDER — LIDOCAINE 50 MG/G
2 PATCH TOPICAL EVERY 24 HOURS
Qty: 30 PATCH | Refills: 2 | Status: SHIPPED | OUTPATIENT
Start: 2025-08-27

## 2025-08-27 RX ORDER — DILTIAZEM HYDROCHLORIDE 120 MG/1
120 CAPSULE, COATED, EXTENDED RELEASE ORAL DAILY
Qty: 90 CAPSULE | Refills: 0 | Status: SHIPPED | OUTPATIENT
Start: 2025-08-27

## 2025-08-27 RX ADMIN — SODIUM BICARBONATE 650 MG: 650 TABLET ORAL at 08:26

## 2025-08-27 RX ADMIN — APIXABAN 5 MG: 5 TABLET, FILM COATED ORAL at 07:05

## 2025-08-27 RX ADMIN — DILTIAZEM HYDROCHLORIDE 30 MG: 30 TABLET, FILM COATED ORAL at 07:05

## 2025-08-27 RX ADMIN — EMPAGLIFLOZIN 10 MG: 10 TABLET, FILM COATED ORAL at 08:26

## 2025-08-27 RX ADMIN — Medication 10 ML: at 08:26

## 2025-08-27 RX ADMIN — ASPIRIN 81 MG: 81 TABLET, COATED ORAL at 08:26

## 2025-08-27 RX ADMIN — DILTIAZEM HYDROCHLORIDE 30 MG: 30 TABLET, FILM COATED ORAL at 00:23

## 2025-08-27 RX ADMIN — TAMSULOSIN HYDROCHLORIDE 0.4 MG: 0.4 CAPSULE ORAL at 08:26

## 2025-08-28 LAB
QT INTERVAL: 352 MS
QTC INTERVAL: 428 MS